# Patient Record
Sex: MALE | Race: WHITE | NOT HISPANIC OR LATINO | Employment: FULL TIME
[De-identification: names, ages, dates, MRNs, and addresses within clinical notes are randomized per-mention and may not be internally consistent; named-entity substitution may affect disease eponyms.]

---

## 2020-02-08 ENCOUNTER — HEALTH MAINTENANCE LETTER (OUTPATIENT)
Age: 55
End: 2020-02-08

## 2020-04-27 ENCOUNTER — VIRTUAL VISIT (OUTPATIENT)
Dept: FAMILY MEDICINE | Facility: CLINIC | Age: 55
End: 2020-04-27
Payer: COMMERCIAL

## 2020-04-27 DIAGNOSIS — A07.8 BLASTOCYSTIS HOMINIS: ICD-10-CM

## 2020-04-27 DIAGNOSIS — R19.7 DIARRHEA OF PRESUMED INFECTIOUS ORIGIN: Primary | ICD-10-CM

## 2020-04-27 PROCEDURE — 36415 COLL VENOUS BLD VENIPUNCTURE: CPT | Performed by: FAMILY MEDICINE

## 2020-04-27 PROCEDURE — 87209 SMEAR COMPLEX STAIN: CPT | Performed by: FAMILY MEDICINE

## 2020-04-27 PROCEDURE — 99203 OFFICE O/P NEW LOW 30 MIN: CPT | Mod: 95 | Performed by: FAMILY MEDICINE

## 2020-04-27 PROCEDURE — 87329 GIARDIA AG IA: CPT | Performed by: FAMILY MEDICINE

## 2020-04-27 PROCEDURE — 87449 NOS EACH ORGANISM AG IA: CPT | Performed by: FAMILY MEDICINE

## 2020-04-27 PROCEDURE — 83630 LACTOFERRIN FECAL (QUAL): CPT | Performed by: FAMILY MEDICINE

## 2020-04-27 PROCEDURE — 87177 OVA AND PARASITES SMEARS: CPT | Performed by: FAMILY MEDICINE

## 2020-04-27 PROCEDURE — 87493 C DIFF AMPLIFIED PROBE: CPT | Performed by: FAMILY MEDICINE

## 2020-04-27 PROCEDURE — 87506 IADNA-DNA/RNA PROBE TQ 6-11: CPT | Performed by: FAMILY MEDICINE

## 2020-04-27 PROCEDURE — 87328 CRYPTOSPORIDIUM AG IA: CPT | Performed by: FAMILY MEDICINE

## 2020-04-27 NOTE — PATIENT INSTRUCTIONS
Patient Education     Diarrhea with Uncertain Cause (Adult)    Diarrhea is when stools are loose and watery. This can be caused by:    Viral infections    Bacterial infections    Food poisoning    Parasites    Irritable bowel syndrome (IBS)    Inflammatory bowel diseases such as ulcerative colitis, Crohn's disease, and celiac disease    Food intolerance, such as to lactose, the sugar found in milk and milk products    Reaction to medicines like antibiotics, laxatives, cancer drugs, and antacids  Along with diarrhea, you may also have:    Abdominal pain and cramping    Nausea and vomiting    Loss of bowel control    Fever and chills    Bloody stools  In some cases, antibiotics may help to treat diarrhea. You may have a stool sample test. This is done to see what is causing your diarrhea, and if antibiotics will help treat it. The results of a stool sample test may take up to 2 days. The healthcare provider may not give you antibiotics until he or she has the stool test results.  Diarrhea can cause dehydration. This is the loss of too much water and other fluids from the body. When this occurs, body fluid must be replaced. This can be done with oral rehydration solutions. Oral rehydration solutions are available at drugstores and grocery stores without a prescription. Sports drinks are not the best choice if you are very dehydrated. They have too much sugar and not enough electrolytes.  Home care  Follow all instructions given by your healthcare provider. Rest at home for the next 24 hours, or until you feel better. Avoid caffeine, tobacco, and alcohol. These can make diarrhea, cramping, and pain worse.  If taking medicines:    Over-the-counter nausea and diarrhea medicines are generally OK unless you experience fever or blood stool. Check with your doctor first in those circumstances.    You may use acetaminophen or NSAID medicines like ibuprofen or naproxen to reduce pain and fever. Don t use these if you have  chronic liver or kidney disease, or ever had a stomach ulcer or gastrointestinal bleeding. Don't use NSAID medicines if you are already taking one for another condition (like arthritis) or are on daily aspirin therapy (such as for heart disease or after a stroke). Talk with your healthcare provider first.    If antibiotics were prescribed, be sure you take them until they are finished. Don t stop taking them even when you feel better. Antibiotics must be taken as a full course.  To prevent the spread of illness:    Remember that washing with soap and water and using alcohol-based  is the best way to prevent the spread of infection. Dry your hands with a single use towel (like a paper towel).    Clean the toilet after each use.    Wash your hands before eating.    Wash your hands before and after preparing food. Keep in mind that people with diarrhea or vomiting should not prepare food for others.    Wash your hands after using cutting boards, countertops, and knives that have been in contact with raw foods.    Wash and then peel fruits and vegetables.    Keep uncooked meats away from cooked and ready-to-eat foods.    Use a food thermometer when cooking. Cook poultry to at least 165 F (74 C). Cook ground meat (beef, veal, pork, lamb) to at least 160 F (71 C). Cook fresh beef, veal, lamb, and pork to at least 145 F (63 C).    Don t eat raw or undercooked eggs (poached or santos side up), poultry, meat, or unpasteurized milk and juices.  Food and drinks  The main goal while treating vomiting or diarrhea is to prevent dehydration. This is done by taking small amounts of liquids often.    Keep in mind that liquids are more important than food right now.    Drink only small amounts of liquids at a time.    Don t force yourself to eat, especially if you are having cramping, vomiting, or diarrhea. Don t eat large amounts at a time, even if you are hungry.    If you eat, avoid fatty, greasy, spicy, or fried  foods.    Don t eat dairy foods or drink milk if you have diarrhea. These can make diarrhea worse.  During the first 24 hours you can try:    Oral rehydration solutions.  Sports drinks may be used if you are not too dehydrated and are otherwise healthy.    Soft drinks without caffeine    Ginger ale    Water (plain or flavored)    Decaf tea or coffee    Clear broth, consommé, or bouillon    Gelatin, popsicles, or frozen fruit juice bars  The second 24 hours, if you are feeling better, you can add:    Hot cereal, plain toast, bread, rolls, or crackers    Plain noodles, rice, mashed potatoes, chicken noodle soup, or rice soup    Unsweetened canned fruit (no pineapple)    Bananas  As you recover:    Limit fat intake to less than 15 grams per day. Don t eat margarine, butter, oils, mayonnaise, sauces, gravies, fried foods, peanut butter, meat, poultry, or fish.    Limit fiber. Don t eat raw or cooked vegetables, fresh fruits except bananas, or bran cereals.    Limit caffeine and chocolate.    Limit dairy.    Don t use spices or seasonings except salt.    Go back to your normal diet over time, as you feel better and your symptoms improve.    If the symptoms come back, go back to a simple diet or clear liquids.  Follow-up care  Follow up with your healthcare provider, or as advised. If a stool sample was taken or cultures were done, call the healthcare provider for the results as instructed.  Call 911  Call 911 if you have any of these symptoms:    Trouble breathing    Confusion    Extreme drowsiness or trouble walking    Loss of consciousness    Rapid heart rate    Chest pain    Stiff neck    Seizure  When to seek medical advice  Call your healthcare provider right away if any of these occur:    Abdominal pain that gets worse    Constant lower right abdominal pain    Continued vomiting and inability to keep liquids down    Diarrhea more than 5 times a day    Blood in vomit or stool    Dark urine or no urine for 8 hours,  dry mouth and tongue, tiredness, weakness, or dizziness    Drowsiness    New rash    You don t get better in 2 to 3 days    Fever of 100.4 F (38 C) or higher, or as directed by your healthcare provider  Date Last Reviewed: 6/1/2018 2000-2019 [employer]. [address] [place] [id number]. All rights reserved. This information is not intended as a substitute for professional medical care. Always follow your healthcare professional's instructions.

## 2020-04-27 NOTE — PROGRESS NOTES
"Refugio Davies is a 54 year old male who is being evaluated via a billable telephone visit.      The patient has been notified of following:     \"This telephone visit will be conducted via a call between you and your physician/provider. We have found that certain health care needs can be provided without the need for a physical exam.  This service lets us provide the care you need with a short phone conversation.  If a prescription is necessary we can send it directly to your pharmacy.  If lab work is needed we can place an order for that and you can then stop by our lab to have the test done at a later time.    Telephone visits are billed at different rates depending on your insurance coverage. During this emergency period, for some insurers they may be billed the same as an in-person visit.  Please reach out to your insurance provider with any questions.    If during the course of the call the physician/provider feels a telephone visit is not appropriate, you will not be charged for this service.\"    Patient has given verbal consent for Telephone visit?  Yes    How would you like to obtain your AVS? Mail a copy    Subjective     Refugio Davies is a 54 year old male who presents to clinic today for the following health issues:    HPI  ABDOMINAL   PAIN     Onset: about one week    Description:   Character: patient states not really pain, just discomfort when rub belly.  Location: upper right quadrant  Radiation: None    Intensity: mild    Progression of Symptoms:  intermittent    Accompanying Signs & Symptoms:  Fever/Chills?: no   Gas/Bloating: no   Nausea: no   Vomitting: no   Diarrhea?: YES  Constipation:no   Dysuria or Hematuria: no    History:   Trauma: no   Previous similar pain: no    Previous tests done: none    Precipitating factors:   Does the pain change with:     Food: no      BM: YES    Urination: no     Alleviating factors:  None.    Therapies Tried and outcome: pepto bismol x 3 dose.    LMP:  not " applicable     Past medical, family, and social histories, medications, and allergies are reviewed and updated in Epic.       Review of Systems   ROS COMP: Constitutional, HEENT, cardiovascular, pulmonary,and gu systems are negative, except as otherwise noted.       Objective   Reported vitals:  There were no vitals taken for this visit.   PSYCH: Alert and oriented times 3; coherent speech, normal   rate and volume, able to articulate logical thoughts, able   to abstract reason, no tangential thoughts, no hallucinations   or delusions  His affect is normal  RESP: No cough, no audible wheezing, able to talk in full sentences  Remainder of exam unable to be completed due to telephone visits          ASSESSMENT/PLAN:  (R19.7) Diarrhea of presumed infectious origin  (primary encounter diagnosis)  Comment:   Plan: Clostridium difficile toxin B PCR, Fecal         Lactoferrin, Giardia antigen, Ova and Parasite         Exam Routine, Ova and Parasite Exam Routine,         Enteric Bacteria and Virus Panel by STEPHANIE Stool,         Ova and Parasite Exam Routine, Adenovirus         antigen stool    (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      Phone call duration:  11 minutes    Vito Maharaj MD

## 2020-04-28 DIAGNOSIS — R19.7 DIARRHEA OF PRESUMED INFECTIOUS ORIGIN: ICD-10-CM

## 2020-04-28 LAB
C COLI+JEJUNI+LARI FUSA STL QL NAA+PROBE: NOT DETECTED
C DIFF TOX B STL QL: NEGATIVE
EC STX1 GENE STL QL NAA+PROBE: NOT DETECTED
EC STX2 GENE STL QL NAA+PROBE: NOT DETECTED
ENTERIC PATHOGEN COMMENT: NORMAL
LACTOFERRIN STL QL IA: POSITIVE
NOROV GI+II ORF1-ORF2 JNC STL QL NAA+PR: NOT DETECTED
RVA NSP5 STL QL NAA+PROBE: NOT DETECTED
SALMONELLA SP RPOD STL QL NAA+PROBE: NOT DETECTED
SHIGELLA SP+EIEC IPAH STL QL NAA+PROBE: NOT DETECTED
SPECIMEN SOURCE: NORMAL
V CHOL+PARA RFBL+TRKH+TNAA STL QL NAA+PR: NOT DETECTED
Y ENTERO RECN STL QL NAA+PROBE: NOT DETECTED

## 2020-04-29 LAB
C PARVUM AG STL QL IA: NEGATIVE
G LAMBLIA AG STL QL IA: NEGATIVE
G LAMBLIA AG STL QL IA: NORMAL
G LAMBLIA AG STL QL IA: NORMAL
HADV AG STL QL IA: NEGATIVE
O+P STL MICRO: ABNORMAL
SPECIMEN SOURCE: ABNORMAL
SPECIMEN SOURCE: NORMAL
SPECIMEN SOURCE: NORMAL

## 2020-04-30 RX ORDER — METRONIDAZOLE 500 MG/1
750 TABLET ORAL 3 TIMES DAILY
Qty: 45 TABLET | Refills: 0 | Status: SHIPPED | OUTPATIENT
Start: 2020-04-30 | End: 2020-05-10

## 2020-06-23 ENCOUNTER — DOCUMENTATION ONLY (OUTPATIENT)
Dept: LAB | Facility: CLINIC | Age: 55
End: 2020-06-23

## 2020-06-23 DIAGNOSIS — Z13.1 SCREENING FOR DIABETES MELLITUS: ICD-10-CM

## 2020-06-23 DIAGNOSIS — Z12.5 SCREENING FOR PROSTATE CANCER: ICD-10-CM

## 2020-06-23 DIAGNOSIS — Z13.220 LIPID SCREENING: Primary | ICD-10-CM

## 2020-06-23 NOTE — PROGRESS NOTES
Patient has an upcoming previsit appointment on 7/7/2020. Please review pended orders and add additional orders if needed.     Thank you,   Aura Hamm

## 2020-08-19 DIAGNOSIS — Z13.220 LIPID SCREENING: ICD-10-CM

## 2020-08-19 DIAGNOSIS — Z13.1 SCREENING FOR DIABETES MELLITUS: ICD-10-CM

## 2020-08-19 DIAGNOSIS — Z12.5 SCREENING FOR PROSTATE CANCER: ICD-10-CM

## 2020-08-19 LAB
CHOLEST SERPL-MCNC: 184 MG/DL
GLUCOSE SERPL-MCNC: 104 MG/DL (ref 70–99)
HDLC SERPL-MCNC: 48 MG/DL
LDLC SERPL CALC-MCNC: 121 MG/DL
NONHDLC SERPL-MCNC: 136 MG/DL
PSA SERPL-ACNC: 0.66 UG/L (ref 0–4)
TRIGL SERPL-MCNC: 77 MG/DL

## 2020-08-19 PROCEDURE — 36415 COLL VENOUS BLD VENIPUNCTURE: CPT | Performed by: FAMILY MEDICINE

## 2020-08-19 PROCEDURE — 80061 LIPID PANEL: CPT | Performed by: FAMILY MEDICINE

## 2020-08-19 PROCEDURE — G0103 PSA SCREENING: HCPCS | Performed by: FAMILY MEDICINE

## 2020-08-19 PROCEDURE — 82947 ASSAY GLUCOSE BLOOD QUANT: CPT | Performed by: FAMILY MEDICINE

## 2020-08-31 ENCOUNTER — OFFICE VISIT (OUTPATIENT)
Dept: FAMILY MEDICINE | Facility: CLINIC | Age: 55
End: 2020-08-31
Payer: COMMERCIAL

## 2020-08-31 VITALS
HEART RATE: 76 BPM | DIASTOLIC BLOOD PRESSURE: 68 MMHG | WEIGHT: 176 LBS | RESPIRATION RATE: 18 BRPM | OXYGEN SATURATION: 96 % | BODY MASS INDEX: 23.33 KG/M2 | TEMPERATURE: 98.3 F | HEIGHT: 73 IN | SYSTOLIC BLOOD PRESSURE: 130 MMHG

## 2020-08-31 DIAGNOSIS — Z23 NEED FOR STREPTOCOCCUS PNEUMONIAE VACCINATION: ICD-10-CM

## 2020-08-31 DIAGNOSIS — Z23 NEED FOR VACCINE FOR TD (TETANUS-DIPHTHERIA): ICD-10-CM

## 2020-08-31 DIAGNOSIS — Z11.4 SCREENING FOR HUMAN IMMUNODEFICIENCY VIRUS: ICD-10-CM

## 2020-08-31 DIAGNOSIS — Z23 NEED FOR INFLUENZA VACCINATION: ICD-10-CM

## 2020-08-31 DIAGNOSIS — Z83.49 FAMILY HISTORY THYROID DISEASE IN BROTHER: ICD-10-CM

## 2020-08-31 DIAGNOSIS — Z86.39 HISTORY OF ELEVATED GLUCOSE: ICD-10-CM

## 2020-08-31 DIAGNOSIS — Z23 NEED FOR SHINGLES VACCINE: ICD-10-CM

## 2020-08-31 DIAGNOSIS — Z12.11 COLON CANCER SCREENING: ICD-10-CM

## 2020-08-31 DIAGNOSIS — Z00.00 ROUTINE GENERAL MEDICAL EXAMINATION AT A HEALTH CARE FACILITY: Primary | ICD-10-CM

## 2020-08-31 PROBLEM — F17.200 TOBACCO USE DISORDER: Status: ACTIVE | Noted: 2020-08-31

## 2020-08-31 PROBLEM — Z87.09 HISTORY OF PNEUMOTHORAX: Status: ACTIVE | Noted: 2020-08-31

## 2020-08-31 PROBLEM — M41.26 OTHER IDIOPATHIC SCOLIOSIS, LUMBAR REGION: Status: ACTIVE | Noted: 2020-08-31

## 2020-08-31 LAB
HBA1C MFR BLD: 5.4 % (ref 0–5.6)
HIV 1+2 AB+HIV1 P24 AG SERPL QL IA: NONREACTIVE
TSH SERPL DL<=0.005 MIU/L-ACNC: 1.52 MU/L (ref 0.4–4)

## 2020-08-31 PROCEDURE — 90714 TD VACC NO PRESV 7 YRS+ IM: CPT | Performed by: FAMILY MEDICINE

## 2020-08-31 PROCEDURE — 83036 HEMOGLOBIN GLYCOSYLATED A1C: CPT | Performed by: FAMILY MEDICINE

## 2020-08-31 PROCEDURE — 90682 RIV4 VACC RECOMBINANT DNA IM: CPT | Performed by: FAMILY MEDICINE

## 2020-08-31 PROCEDURE — 36415 COLL VENOUS BLD VENIPUNCTURE: CPT | Performed by: FAMILY MEDICINE

## 2020-08-31 PROCEDURE — 90732 PPSV23 VACC 2 YRS+ SUBQ/IM: CPT | Performed by: FAMILY MEDICINE

## 2020-08-31 PROCEDURE — 90472 IMMUNIZATION ADMIN EACH ADD: CPT | Performed by: FAMILY MEDICINE

## 2020-08-31 PROCEDURE — 99396 PREV VISIT EST AGE 40-64: CPT | Mod: 25 | Performed by: FAMILY MEDICINE

## 2020-08-31 PROCEDURE — 90750 HZV VACC RECOMBINANT IM: CPT | Performed by: FAMILY MEDICINE

## 2020-08-31 PROCEDURE — 87389 HIV-1 AG W/HIV-1&-2 AB AG IA: CPT | Performed by: FAMILY MEDICINE

## 2020-08-31 PROCEDURE — 84443 ASSAY THYROID STIM HORMONE: CPT | Performed by: FAMILY MEDICINE

## 2020-08-31 PROCEDURE — 90471 IMMUNIZATION ADMIN: CPT | Performed by: FAMILY MEDICINE

## 2020-08-31 ASSESSMENT — PAIN SCALES - GENERAL: PAINLEVEL: MILD PAIN (3)

## 2020-08-31 ASSESSMENT — MIFFLIN-ST. JEOR: SCORE: 1696.17
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
## 2020-08-31 NOTE — PROGRESS NOTES
Prior to immunization administration, verified patients identity using patient s name and date of birth. Please see Immunization Activity for additional information.     Screening Questionnaire for Adult Immunization    Are you sick today?   No   Do you have allergies to medications, food, a vaccine component or latex?   No   Have you ever had a serious reaction after receiving a vaccination?   No   Do you have a long-term health problem with heart, lung, kidney, or metabolic disease (e.g., diabetes), asthma, a blood disorder, no spleen, complement component deficiency, a cochlear implant, or a spinal fluid leak?  Are you on long-term aspirin therapy?   No   Do you have cancer, leukemia, HIV/AIDS, or any other immune system problem?   No   Do you have a parent, brother, or sister with an immune system problem?   No   In the past 3 months, have you taken medications that affect  your immune system, such as prednisone, other steroids, or anticancer drugs; drugs for the treatment of rheumatoid arthritis, Crohn s disease, or psoriasis; or have you had radiation treatments?   No   Have you had a seizure, or a brain or other nervous system problem?   No   During the past year, have you received a transfusion of blood or blood    products, or been given immune (gamma) globulin or antiviral drug?   No   For women: Are you pregnant or is there a chance you could become       pregnant during the next month?   No   Have you received any vaccinations in the past 4 weeks?   No     Immunization questionnaire answers were all negative.        Per orders of Dr. Maharaj, injection of Influenza   given by Maria Teresa Baum MA. Patient instructed to remain in clinic for 15 minutes afterwards, and to report any adverse reaction to me immediately.       Screening performed by Maria Teresa Baum MA on 8/31/2020 at 9:31 AM.

## 2020-08-31 NOTE — PATIENT INSTRUCTIONS
At [facility], we strive to deliver an exceptional experience to you, every time we see you. If you receive a survey, please complete it as we do value your feedback.  If you have MyChart, you can expect to receive results automatically within 24 hours of their completion.  Your provider will send a note interpreting your results as well.   If you do not have MyChart, you should receive your results in about a week by mail.    Your care team:                            Family Medicine Internal Medicine   MD Angel Anderson, MD Kvng De Anda MD Katya Georgiev PA-C  Genna Villeda, APRN CNP    Yonis Allen, MD Pediatrics   Philippe Monzon, PARadhaC  Tasneem Gonzales, CNP MD Chandni Murray APRN CNP   MD Gini Gray MD Deborah Mielke, MD Rosa Elena Leal, APRN CNP  Joceline Avelar, PARadhaC  Katarina Valentin, CNP  MD Pearl Wetzel MD Angela Wermerskirchen, MD      Clinic hours: Monday - Thursday 7 am-7 pm; Fridays 7 am-5 pm.   Urgent care: Monday - Friday 11 am-9 pm; Saturday and Sunday 9 am-5 pm.    Clinic: [phone]       [facility]: Monday - Thursday 8 am - 7 pm; Friday 8 am - 6 pm  [facility]: [phone]     Use [email or web address] for 24/7 diagnosis and treatment of dozens of conditions.      Preventive Health Recommendations  Male Ages 50 - 64    Yearly exam:             See your health care provider every year in order to  o   Review health changes.   o   Discuss preventive care.    o   Review your medicines if your doctor has prescribed any.     Have a cholesterol test every 5 years, or more frequently if you are at risk for high cholesterol/heart disease.     Have a diabetes test (fasting glucose) every three years. If you are at risk for diabetes, you should have this test more often.     Have a colonoscopy at age 50, or have a yearly  FIT test (stool test). These exams will check for colon cancer.      Talk with your health care provider about whether or not a prostate cancer screening test (PSA) is right for you.    You should be tested each year for STDs (sexually transmitted diseases), if you re at risk.     Shots: Get a flu shot each year. Get a tetanus shot every 10 years.     Nutrition:    Eat at least 5 servings of fruits and vegetables daily.     Eat whole-grain bread, whole-wheat pasta and brown rice instead of white grains and rice.     Get adequate Calcium and Vitamin D.     Lifestyle    Exercise for at least 150 minutes a week (30 minutes a day,  days a week). This will help you control your weight and prevent disease.     Limit alcohol to one drink per day.     No smoking.     Wear sunscreen to prevent skin cancer.     See your dentist every six months for an exam and cleaning.     See your eye doctor every 1 to 2 years.    You should receive your second (final) shingles vaccine between 10/31/20 & 3/1/21.   Patient Education     Colorectal Cancer Screening    Colorectal cancer is cancer in the colon or rectum. It is a leading cause of cancer deaths in the [place] But when this cancer is found and removed early, the chances of a full recovery are very good. Because colorectal cancer rarely causes symptoms in its early stages, screening for the disease is important. It s even more crucial if you have risk factors for the disease. Learn more about colorectal cancer and its risk factors. Then talk to your healthcare provider about being screened.  Risk factors for colorectal cancer  Your risk of having colorectal cancer increases if you:    Are 50 years of age or older    Have a family history or personal history of colorectal cancer or polyps    Have a personal history of type 2 diabetes, Crohn s disease, or ulcerative colitis    Have an inherited genetic syndrome like Barboza syndrome (HNPCC) or familial adenomatous polyposis (FAP)    Are  very overweight    Are not physically active    Smoke    Drink a lot of alcohol    Eat a lot of red or processed meat  The colon and rectum  Waste from food you eat enters the colon from the small intestine. As it travels through the colon, the waste (stool) loses water and becomes more solid. Intestinal muscles push it toward the sigmoid colon. This is the last section of the colon. Stool then moves into the rectum, where it s stored until it s ready to leave the body during a bowel movement.  How colorectal cancer starts  Polyps are growths that form on the inner lining of the colon or rectum. Most are benign, which means they aren t cancer. But over time, some polyps can become cancer (malignant). This happens when cells in these polyps begin growing abnormally. In time, malignant cells invade more of the colon and rectum. The cancer may also spread to nearby organs or lymph nodes or to other parts of the body. Finding and removing polyps can help prevent cancer from forming.  Your screening  Screening means looking for a health problem before you have symptoms. During screening for colorectal cancer, your healthcare provider will ask about your health history, examine you, and do 1 or more tests. To start, you may have:    Health history questions to answer. Your healthcare provider will ask about your health history. Mention if a family member has had colon cancer or polyps. Also mention any health problems you have had in the past.    Digital rectal exam (ESTHER). During a ESTHER, the healthcare provider inserts a lubricated gloved finger into the rectum. The test is painless and takes less than a minute. This test alone is not enough to screen for colorectal cancer. You will also need one of the below tests.  Screening test choices  Fecal occult blood test (FOBT) or fecal immunochemical test (FIT)  These tests check for blood in stool that you can t see (hidden or occult blood). Hidden blood may be a sign of colon  polyps or cancer. A small sample of stool is tested for blood in a laboratory. Most often, you collect this sample at home using a kit your healthcare provider gives you. Follow the instructions carefully for using this kit. You might need to not eat certain foods and not take certain medicines before the test, as directed.  Stool DNA test  This test looks for DNA changes in cells in the stool. These DNA changes might be signs of cancer. It also looks for hidden blood in stool. For this test, you collect an entire bowel movement. This is done using a special container put in the toilet. The sample is then sent to a lab for testing.  Barium enema with contrast (double-contrast barium enema)  This test uses X-rays to create images of the entire colon and rectum. The day before this test, you will need to do a bowel prep to clean out the colon and rectum. A bowel prep is a liquid diet plus strong laxatives or enemas. You will be awake for the test, but you may be given medicine to help you relax. At the start of the test, a healthcare provider who specializes in imaging tests (radiologist) places a soft tube into the rectum. The tube is used to fill the colon with a contrast liquid (barium) and air. This can be uncomfortable for some people. The liquid helps the colon show up clearly on the X-rays. Because the test uses X-rays, it exposes you to a small amount of radiation.  Virtual colonoscopy  This exam is also called a CT colonography. It uses a series of X-ray photographs to create a 3-D view of the colon and rectum. The day before the test, you will need to do a bowel prep to clean out your colon. Your healthcare provider will give you instructions on how to do this. During the procedure, you will lie on a table that is part of a special X-ray machine called a CT scanner. A small tube will be placed into your rectum to fill the colon and rectum with air. This can be uncomfortable for some people. Then, the table  will move into the machine and pictures will be taken of your colon and rectum. A computer will combine these photos to create a 3-D picture. Because the test uses X-rays, it exposes you to a small amount of radiation.  Scope exams  Here are 2 types of scope exams:    Colonoscopy. This test can be used to find and remove polyps anywhere in the colon or rectum. The day before the test, you will do a bowel prep. This is a liquid diet plus a strong laxative solution or an enema. The bowel prep will cleanse your colon. You will be given instructions for this. Just before the test, you are given a medicine to make you sleepy. Then, a long, flexible, lighted tube called a colonoscope is gently inserted into the rectum and guided through the entire colon. Images of the colon are viewed on a video screen. Any polyps that are found are removed and sent to a lab for testing. If a polyp can t be removed, a sample of tissue is taken and the polyp might be removed later during surgery. You will need to bring someone with you to drive you home after this test. Colonoscopy is the only screening test that lets your healthcare provider see the entire colon and rectum. This test also lets your healthcare provider remove any pieces of tissue that need to be looked at by a lab. If something suspicious is found using any other tests, you will likely need a colonoscopy.    Sigmoidoscopy. This test is similar to colonoscopy, but focuses only on the sigmoid colon and rectum. As with colonoscopy, bowel prep must be done the day before this test. It might not need to be as complete as the bowel prep for a colonoscopy. You are awake during the procedure, but you may be given medicine to help you relax. During the test, the healthcare provider guides a thin, flexible, lighted tube called a sigmoidoscope through your rectum and lower colon. The images are displayed on a video screen. Polyps are removed, if possible, and sent to a lab for  testing.     When to call your healthcare provider after a test  Call your healthcare provider if you have any of the following after any screening test:    Bleeding    Fever of 100.4 F (38 C) or higher, or as directed by your healthcare provider    Abdominal pain    Vomiting   Date Last Reviewed: 12/1/2017 2000-2019 [employer] [address] [place] [address]. All rights reserved. This information is not intended as a substitute for professional medical care. Always follow your healthcare professional's instructions.

## 2020-09-01 DIAGNOSIS — Z00.00 ROUTINE GENERAL MEDICAL EXAMINATION AT A HEALTH CARE FACILITY: ICD-10-CM

## 2020-09-01 DIAGNOSIS — Z12.11 COLON CANCER SCREENING: ICD-10-CM

## 2020-09-01 LAB — HEMOCCULT STL QL IA: NEGATIVE

## 2020-09-01 PROCEDURE — 82274 ASSAY TEST FOR BLOOD FECAL: CPT | Performed by: FAMILY MEDICINE

## 2021-09-11 ENCOUNTER — HEALTH MAINTENANCE LETTER (OUTPATIENT)
Age: 56
End: 2021-09-11

## 2021-11-06 ENCOUNTER — HEALTH MAINTENANCE LETTER (OUTPATIENT)
Age: 56
End: 2021-11-06

## 2021-11-20 ENCOUNTER — ANCILLARY PROCEDURE (OUTPATIENT)
Dept: GENERAL RADIOLOGY | Facility: CLINIC | Age: 56
End: 2021-11-20
Attending: PHYSICIAN ASSISTANT
Payer: COMMERCIAL

## 2021-11-20 ENCOUNTER — OFFICE VISIT (OUTPATIENT)
Dept: URGENT CARE | Facility: URGENT CARE | Age: 56
End: 2021-11-20
Payer: COMMERCIAL

## 2021-11-20 VITALS
RESPIRATION RATE: 14 BRPM | SYSTOLIC BLOOD PRESSURE: 153 MMHG | TEMPERATURE: 98.5 F | OXYGEN SATURATION: 98 % | HEART RATE: 69 BPM | DIASTOLIC BLOOD PRESSURE: 75 MMHG

## 2021-11-20 DIAGNOSIS — M25.551 HIP PAIN, RIGHT: ICD-10-CM

## 2021-11-20 DIAGNOSIS — M79.651 PAIN OF RIGHT THIGH: ICD-10-CM

## 2021-11-20 DIAGNOSIS — M79.651 PAIN OF RIGHT THIGH: Primary | ICD-10-CM

## 2021-11-20 PROCEDURE — 72170 X-RAY EXAM OF PELVIS: CPT | Mod: RT | Performed by: RADIOLOGY

## 2021-11-20 PROCEDURE — 73552 X-RAY EXAM OF FEMUR 2/>: CPT | Mod: RT | Performed by: RADIOLOGY

## 2021-11-20 PROCEDURE — 99213 OFFICE O/P EST LOW 20 MIN: CPT | Performed by: PHYSICIAN ASSISTANT

## 2021-11-20 ASSESSMENT — ENCOUNTER SYMPTOMS
MYALGIAS: 1
CONSTITUTIONAL NEGATIVE: 1
EYES NEGATIVE: 1
GASTROINTESTINAL NEGATIVE: 1
ENDOCRINE NEGATIVE: 1
RESPIRATORY NEGATIVE: 1
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
## 2022-01-11 ENCOUNTER — OFFICE VISIT (OUTPATIENT)
Dept: FAMILY MEDICINE | Facility: CLINIC | Age: 57
End: 2022-01-11
Payer: OTHER MISCELLANEOUS

## 2022-01-11 VITALS
OXYGEN SATURATION: 98 % | HEART RATE: 102 BPM | SYSTOLIC BLOOD PRESSURE: 155 MMHG | WEIGHT: 174.4 LBS | TEMPERATURE: 97.3 F | DIASTOLIC BLOOD PRESSURE: 75 MMHG | BODY MASS INDEX: 22.85 KG/M2

## 2022-01-11 DIAGNOSIS — M25.522 LEFT ELBOW PAIN: ICD-10-CM

## 2022-01-11 DIAGNOSIS — M79.632 PAIN OF LEFT FOREARM: ICD-10-CM

## 2022-01-11 DIAGNOSIS — Z02.6 ENCOUNTER RELATED TO WORKER'S COMPENSATION CLAIM: Primary | ICD-10-CM

## 2022-01-11 PROCEDURE — 99214 OFFICE O/P EST MOD 30 MIN: CPT | Performed by: PREVENTIVE MEDICINE

## 2022-01-11 NOTE — PROGRESS NOTES
"  Assessment & Plan     Encounter related to worker's compensation claim  -date of injury 1/6/22  -notes from the emergency room reviewed  -X rays in the ER did not show any acute bony abnormalities  -work note provided, please see Letters section   - MR Elbow Left w/o Contrast  - Orthopedic  Referral    Left elbow pain  -differentials include elbow strain, epicondylitis, muscle tears   - MR Elbow Left w/o Contrast  - Orthopedic  Referral    Pain of left forearm  - MR Elbow Left w/o Contrast  - Orthopedic  Referral      Review of external notes as documented elsewhere in note  Ordering of each unique test  25 minutes spent on the date of the encounter doing chart review, history and exam, documentation and further activities per the note       Tobacco Cessation:   reports that he has been smoking cigarettes. He has been smoking about 0.50 packs per day. He has never used smokeless tobacco.      Return in about 2 weeks (around 1/25/2022) for Follow up, with any available provider.    Cathleen Gomez MD MPH    [facility] BRITTON Huff is a 56 year old who presents for the following health issues :    hospitals     Visit for Work Comp:    Date of Injury: 1/6/22    No major changes in pain  Pain does not keep awake at night  No fever  No bruising  Is right hand dominant  Some weakness  No focal tingling  Pain is mainly in elbow and then at times may radiate down the forearm.   Works for [employer]  Pain started after her was lifting a garbage can from a snow bank, heard popping at that time. Since then pain has increased. Was able to get through his work day after initial injury.       Per Emergency room evaluation on 1/6/22:      \"History of Present Illness:   hospitals  Refugiocasey Davies is a 56 y.o. male who presents to the emergency department for evaluation of left arm pain. The patient was working and moving a heavy garbage can when he felt a pop and " "abrupt onset of pain in the left forearm. Pain persisted throughout the day but he was able to finish his job. He reports pain that is worse with movement. No other pain or injuries. No paresthesias or weakness.   Impression and Plan:  Refugio Davies is a 56 y.o. male with left forearm pain after an injury that occurred today at work. The patient was lifting a heavy garbage can when he felt a pop and sudden onset of pain in the forearm. Here, the patient has tenderness throughout the proximal left forearm. There is no weakness or paresthesias. X-rays obtained and are negative without any clear avulsion fracture. History concerning for possible muscle/tendon rupture or strain. He has full range of motion though. I recommended supportive cares with Tylenol, ibuprofen, ice and rest. He was instructed to follow-up with primary care next week for recheck and to discuss advanced imaging if not improving. Indications for return were reviewed. \"    Review of Systems   Constitutional, HEENT, cardiovascular, pulmonary, gi and gu systems are negative, except as otherwise noted.      Objective    BP (!) 155/75 (BP Location: Right arm, Patient Position: Sitting, Cuff Size: Adult Regular)   Pulse 102   Temp 97.3  F (36.3  C) (Tympanic)   Wt 79.1 kg (174 lb 6.4 oz)   SpO2 98%   BMI 22.85 kg/m    Body mass index is 22.85 kg/m .  Physical Exam   GENERAL APPEARANCE: healthy, alert and no distress  EYES: Eyes grossly normal to inspection and conjunctivae and sclerae normal  RESP: lungs clear to auscultation - no rales, rhonchi or wheezes  CV: regular rates and rhythm, normal S1 S2  SKIN: no suspicious lesions or rashes  NEURO: Normal strength and tone, mentation intact and speech normal  PSYCH: mentation appears normal  Left upper extremity: no gross deformities, keeping arm close to his chest in a flexed position. No skin changes, no bruising, no increase in temperature, pulses intact, sensation grossly intact, strength and " range of motion intact in the wrist joint, some tenderness over the medial epicondyle, pain with flexion and extension of the elbow, pain in elbow with resistance at wrist, slight deformity noted proximal to the elbow.

## 2022-01-11 NOTE — LETTER
January 11, 2022      Refugio Davies  [address]  [facility] [address]        To Whom It May Concern:    Refugio Davies was seen in clinic on 1/11/22. He may return to work with the following restrictions in place for 2 weeks:    -No lifting, pushing or pulling with the LEFT upper extremity.     Please let me know if you have any questions.       Sincerely,      Cathleen Gomez MD MPH

## 2022-01-19 ENCOUNTER — ANCILLARY PROCEDURE (OUTPATIENT)
Dept: MRI IMAGING | Facility: CLINIC | Age: 57
End: 2022-01-19
Attending: PREVENTIVE MEDICINE
Payer: OTHER MISCELLANEOUS

## 2022-01-19 DIAGNOSIS — Z02.6 ENCOUNTER RELATED TO WORKER'S COMPENSATION CLAIM: ICD-10-CM

## 2022-01-19 DIAGNOSIS — M25.522 LEFT ELBOW PAIN: ICD-10-CM

## 2022-01-19 DIAGNOSIS — M79.632 PAIN OF LEFT FOREARM: ICD-10-CM

## 2022-01-19 PROCEDURE — 73221 MRI JOINT UPR EXTREM W/O DYE: CPT | Mod: LT | Performed by: RADIOLOGY

## 2022-01-19 NOTE — RESULT ENCOUNTER NOTE
Refugio,     The MRI of the elbow is showing inflammation of a tendon and a moderate grade tear in a tendon (common extensor tendon). Please follow up with the Orthopedics doctor for further evaluation.     Please do not hesitate to call us at [phone] if you have any questions or concerns.    Thank you,    Cathleen Gomez MD MPH

## 2022-01-20 ENCOUNTER — OFFICE VISIT (OUTPATIENT)
Dept: ORTHOPEDICS | Facility: CLINIC | Age: 57
End: 2022-01-20
Attending: PREVENTIVE MEDICINE
Payer: OTHER MISCELLANEOUS

## 2022-01-20 VITALS
SYSTOLIC BLOOD PRESSURE: 160 MMHG | HEIGHT: 73 IN | BODY MASS INDEX: 23.11 KG/M2 | DIASTOLIC BLOOD PRESSURE: 83 MMHG | WEIGHT: 174.4 LBS

## 2022-01-20 DIAGNOSIS — S46.912A SHOULDER STRAIN, LEFT, INITIAL ENCOUNTER: ICD-10-CM

## 2022-01-20 DIAGNOSIS — S16.1XXA NECK STRAIN, INITIAL ENCOUNTER: ICD-10-CM

## 2022-01-20 DIAGNOSIS — M25.522 LEFT ELBOW PAIN: ICD-10-CM

## 2022-01-20 DIAGNOSIS — M79.632 PAIN OF LEFT FOREARM: ICD-10-CM

## 2022-01-20 DIAGNOSIS — Z02.6 ENCOUNTER RELATED TO WORKER'S COMPENSATION CLAIM: ICD-10-CM

## 2022-01-20 DIAGNOSIS — M77.12 LATERAL EPICONDYLITIS, LEFT ELBOW: Primary | ICD-10-CM

## 2022-01-20 PROCEDURE — 99243 OFF/OP CNSLTJ NEW/EST LOW 30: CPT | Performed by: ORTHOPAEDIC SURGERY

## 2022-01-20 ASSESSMENT — PAIN SCALES - GENERAL: PAINLEVEL: SEVERE PAIN (7)

## 2022-01-20 ASSESSMENT — MIFFLIN-ST. JEOR: SCORE: 1679.95

## 2022-01-20 NOTE — LETTER
January 20, 2022      Refugio Davies  [address]  [place] [address]        To Whom It May Concern,      Refugio is under my care for a left arm injury. At this time, he is able to return to work as of 1/26/2022 with the following restrictions: He is unable to push, pull or lift with the left arm. Follow-up to be scheduled in 4 weeks for updated restrictions.          Sincerely,        Jose Daniel Valenzuela MD

## 2022-01-20 NOTE — PROGRESS NOTES
Chief Complaint:   Chief Complaint   Patient presents with     Left Elbow - Pain     1/6/2022 work injury. Was lifting and felt a pop over the lateral elbow with shooting pain into 4th and 5th digits. Is currently not working due to injury. Has tried ice and heat. Denies bruising. Endorses tingling and numbness during MRI     Left Forearm - Pain     1/6/2022 work injury. Was lifting and felt a pop over the lateral elbow with shooting pain into 4th and 5th digits. Is currently not working due to injury. Has tried ice and heat. Denies bruising. Endorses tingling and numbness during MRI      Refugio Davies is seen today in the [facility] for evaluation of left elbow injury at the request of Dr. Cathleen Gomez       HPI: Refugio Davies is a 56 year old male , right -hand dominant, who presents for evaluation and management of a left elbow injury. The injury occurred on 1/6/2022 while at work, pulling a garbage can out of the snow and felt a pop and pain down the forearm to the hand. Worked through the day with the pain. Presented to the ED that night with xrays of the forearm and wrist negative.  It has been 2 weeks since the initial injury. Treatment has been ice, rest, heat. Occasional tylenol but doesn't care to take anything usually. No notable swelling or bruising.    Had MRI yesterday, had pain, numbness and tingling into the ring/small fingers, shoulder.    Occasional pain in the shoulder off/on since injury as well. Has also had some pain up the neck and up into the jaw/left ear.      He reports having moderate-severe pain/discomfort around the injury site. He denies numbness or tingling.   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   Patient Active Problem List   Diagnosis     CARDIOVASCULAR SCREENING; LDL GOAL LESS THAN 160     Tobacco use disorder     Other idiopathic scoliosis, lumbar region     History of pneumothorax       Past surgical history:  has a past surgical history that includes Open reduction internal fixation rodding intramedullary femur (Right, 1997) and Lung surgery (Right, 2009).     Medications: @     Allergies:   No Known Allergies     Family History: family history includes Breast Cancer in his mother; Diabetes in his maternal aunt; Thyroid Disease in his brother.     Social History:  reports that he has been smoking cigarettes. He has been smoking about 0.50 packs per day. He has never used smokeless tobacco. He reports current alcohol use. He reports that he does not use drugs.    Review of Systems:  ROS: 10 point ROS neg other than the symptoms noted above in the HPI and past medical history.    Physical Exam  GENERAL APPEARANCE: healthy, alert, no distress.   SKIN: no suspicious lesions or rashes  RESPIRATORY: No increased work of breathing.  NEURO: Normal strength and tone, mentation intact and speech normal  PSYCH:  mentation appears normal and affect normal. Not anxious.  Hands: no clubbing, nail pitting or nodes.    BP (!) 160/83   Ht 1.854 m (6' 1\")   Wt 79.1 kg (174 lb 6.4 oz)   BMI 23.01 kg/m       Neck:  Full range of motion  Tender to palpation along the left paraspinals and trapezius       left  UPPER EXTREMITY:    Sensation intact to light touch in " median, radial, ulnar and axillary nerve distributions  Palpable 2+ radial pulse, brisk capillary refill to all fingers, wwp  Intact epl fpl fdp edc wrist flexion/extension biceps triceps deltoid  DTR's normal biceps and brachioradialis      SHOULDER:  Skin intact. No abnormal discoloration  No obvious swelling  Tender to palpation deltoid, supraspinatus  Range of motion: within normal limits  Positive impingement, mild    ELBOW:  Skin intact. No open wounds. No skin maceration.  Inspection: no swelling, no ecchymosis, no olecranon bursa swelling, no distal bicep tendon defect  Tender: lateral epicondyle, common extensor tendon and extensor muscle of forearm  Non-tender: medial epicondyle, common flexor tendon, flexor muscle of forearm, supracondylar notch, olecranon bursa and distal bicep tendon  Range of Motion: all normal  Strength: elbow strength full  Special tests: normal stability, pain with resisted wrist extension, pain with resisted middle finger extension, no pain with resisted wrist flexion  Resisted wrist extension also causes dorsal hand pain.  There is no gross deformity in the area.        X-rays:  3 views left wrist, 2 views left forearm, [facility], from 1/6/2022 were reviewed in clinic today.  No obvious fractures or dislocations..    MRI left elbow 1/19/2022:  On the background of tendinosis, moderate-grade intrasubstance tear of the common extensor tendon at its proximal attachment.    Assessment: 56 year old male , right -hand dominant, with:  1)  acute left elbow pain, strain, underlying lateral epicondylitis.  2) left shoulder strain  3) left neck strain      Plan: nonsurgical..  * discussed with patient history and clinical exam consistent with strain of basically the entire left upper extremity from the neck to the fingers.  * suspect when he pulled on the garbage can pulled or stretched the left upper extremity, straining the neck/arm/forearm and possibly pulling on the nerves as well,  given some numbness and tingling.  * at this time, no surgical treatment recommended.    * there is MRI and clinical evidence of tennis elbow, or lateral epicondylitis, which is tendonitis at the lateral aspect of the elbow.  * treatment is nonoperative, with surgical treatment reserved for recalcitrant symptoms despite long-term nonperative treatment regimen. Most cases expected to resolve over time based on natural history.    Treatment includes:  * rest  * activity modification - avoid aggravating activities and reduce strenuous activities for 6-8 weeks  * ice, ice massage  * Physical therapy, modalities as indicated including ultrasound, massage, iontophoresis  * counterforce elbow brace/strap, provided today  * wrist brace to prevent wrist extension, provided today  * elbow massage and icing  * shoulder range of motion, gentle  * gentle neck range of motion.  * modify lifting technique, palm upwards with lifting to relieve stress on extensor tendons of wrist.  * NDAIDS regularly three times daily x2-3 weeks  * cortisone injection discussed, place at point of maximal tenderness, discussed and consider in future as needed .    Discussed PRP injections, out of pocket expense.  * return to clinic 4 weeks  * workability note.        Jose Daniel Valenzuela M.D., M.S.  Dept. of Orthopaedic Surgery  [facility]

## 2022-01-20 NOTE — LETTER
1/20/2022         RE: Refugio Davies  [address] [place] [address]        Dear Colleague,    Thank you for referring your patient, Refugio Davies, to the [facility] [place]. Please see a copy of my visit note below.    Chief Complaint:   Chief Complaint   Patient presents with     Left Elbow - Pain     1/6/2022 work injury. Was lifting and felt a pop over the lateral elbow with shooting pain into 4th and 5th digits. Is currently not working due to injury. Has tried ice and heat. Denies bruising. Endorses tingling and numbness during MRI     Left Forearm - Pain     1/6/2022 work injury. Was lifting and felt a pop over the lateral elbow with shooting pain into 4th and 5th digits. Is currently not working due to injury. Has tried ice and heat. Denies bruising. Endorses tingling and numbness during MRI      Refugio Davies is seen today in the [facility] for evaluation of left elbow injury at the request of Dr. Cathleen Gomez       HPI: Refugio Davies is a 56 year old male , right -hand dominant, who presents for evaluation and management of a left elbow injury. The injury occurred on 1/6/2022 while at work, pulling a garbage can out of the snow and felt a pop and pain down the forearm to the hand. Worked through the day with the pain. Presented to the ED that night with xrays of the forearm and wrist negative.  It has been 2 weeks since the initial injury. Treatment has been ice, rest, heat. Occasional tylenol but doesn't care to take anything usually. No notable swelling or bruising.    Had MRI yesterday, had pain, numbness and tingling into the ring/small fingers, shoulder.    Occasional pain in the shoulder off/on since injury as well. Has also had some pain up the neck and up into the jaw/left ear.      He reports having moderate-severe pain/discomfort around the injury site. He denies numbness or tingling.   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   Patient Active Problem List   Diagnosis     CARDIOVASCULAR SCREENING; LDL GOAL LESS THAN 160     Tobacco use disorder     Other idiopathic scoliosis, lumbar region     History of pneumothorax       Past surgical history:  has a past surgical history that includes Open reduction internal fixation rodding intramedullary femur (Right, 1997) and Lung surgery (Right, 2009).     Medications: @     Allergies:   No Known Allergies     Family History: family history includes Breast Cancer in his mother; Diabetes in his maternal aunt; Thyroid Disease in his brother.     Social History:  reports that he has been smoking cigarettes. He has been smoking about 0.50 packs per day. He has never used smokeless tobacco. He reports current alcohol use. He reports that he does not use drugs.    Review of Systems:  ROS: 10 point ROS neg other than the symptoms noted above in the HPI and past medical history.    Physical Exam  GENERAL APPEARANCE: healthy, alert, no distress.   SKIN: no suspicious lesions or rashes  RESPIRATORY: No increased work of breathing.  NEURO: Normal strength and tone, mentation intact and speech normal  PSYCH:  mentation appears normal and affect normal. Not anxious.  Hands: no clubbing, nail pitting or  "nodes.    BP (!) 160/83   Ht 1.854 m (6' 1\")   Wt 79.1 kg (174 lb 6.4 oz)   BMI 23.01 kg/m       Neck:  Full range of motion  Tender to palpation along the left paraspinals and trapezius       left  UPPER EXTREMITY:    Sensation intact to light touch in median, radial, ulnar and axillary nerve distributions  Palpable 2+ radial pulse, brisk capillary refill to all fingers, wwp  Intact epl fpl fdp edc wrist flexion/extension biceps triceps deltoid  DTR's normal biceps and brachioradialis      SHOULDER:  Skin intact. No abnormal discoloration  No obvious swelling  Tender to palpation deltoid, supraspinatus  Range of motion: within normal limits  Positive impingement, mild    ELBOW:  Skin intact. No open wounds. No skin maceration.  Inspection: no swelling, no ecchymosis, no olecranon bursa swelling, no distal bicep tendon defect  Tender: lateral epicondyle, common extensor tendon and extensor muscle of forearm  Non-tender: medial epicondyle, common flexor tendon, flexor muscle of forearm, supracondylar notch, olecranon bursa and distal bicep tendon  Range of Motion: all normal  Strength: elbow strength full  Special tests: normal stability, pain with resisted wrist extension, pain with resisted middle finger extension, no pain with resisted wrist flexion  Resisted wrist extension also causes dorsal hand pain.  There is no gross deformity in the area.        X-rays:  3 views left wrist, 2 views left forearm, [facility], from 1/6/2022 were reviewed in clinic today.  No obvious fractures or dislocations..    MRI left elbow 1/19/2022:  On the background of tendinosis, moderate-grade intrasubstance tear of the common extensor tendon at its proximal attachment.    Assessment: 56 year old male , right -hand dominant, with:  1)  acute left elbow pain, strain, underlying lateral epicondylitis.  2) left shoulder strain  3) left neck strain      Plan: nonsurgical..  * discussed with patient history and clinical exam " consistent with strain of basically the entire left upper extremity from the neck to the fingers.  * suspect when he pulled on the garbage can pulled or stretched the left upper extremity, straining the neck/arm/forearm and possibly pulling on the nerves as well, given some numbness and tingling.  * at this time, no surgical treatment recommended.    * there is MRI and clinical evidence of tennis elbow, or lateral epicondylitis, which is tendonitis at the lateral aspect of the elbow.  * treatment is nonoperative, with surgical treatment reserved for recalcitrant symptoms despite long-term nonperative treatment regimen. Most cases expected to resolve over time based on natural history.    Treatment includes:  * rest  * activity modification - avoid aggravating activities and reduce strenuous activities for 6-8 weeks  * ice, ice massage  * Physical therapy, modalities as indicated including ultrasound, massage, iontophoresis  * counterforce elbow brace/strap, provided today  * wrist brace to prevent wrist extension, provided today  * elbow massage and icing  * shoulder range of motion, gentle  * gentle neck range of motion.  * modify lifting technique, palm upwards with lifting to relieve stress on extensor tendons of wrist.  * NDAIDS regularly three times daily x2-3 weeks  * cortisone injection discussed, place at point of maximal tenderness, discussed and consider in future as needed .    Discussed PRP injections, out of pocket expense.  * return to clinic 4 weeks  * workability note.        Jose Daniel Valenzuela M.D., M.S.  Dept. of Orthopaedic Surgery  [facility]      Again, thank you for allowing me to participate in the care of your patient.        Sincerely,        Jose Daniel Valenzuela MD

## 2022-01-24 ENCOUNTER — THERAPY VISIT (OUTPATIENT)
Dept: PHYSICAL THERAPY | Facility: CLINIC | Age: 57
End: 2022-01-24
Attending: ORTHOPAEDIC SURGERY
Payer: OTHER MISCELLANEOUS

## 2022-01-24 DIAGNOSIS — S16.1XXA NECK STRAIN, INITIAL ENCOUNTER: ICD-10-CM

## 2022-01-24 DIAGNOSIS — M77.12 LATERAL EPICONDYLITIS, LEFT ELBOW: ICD-10-CM

## 2022-01-24 DIAGNOSIS — M54.2 NECK PAIN: ICD-10-CM

## 2022-01-24 DIAGNOSIS — M25.512 ACUTE PAIN OF LEFT SHOULDER: ICD-10-CM

## 2022-01-24 DIAGNOSIS — M79.632 PAIN OF LEFT FOREARM: ICD-10-CM

## 2022-01-24 DIAGNOSIS — S46.912A SHOULDER STRAIN, LEFT, INITIAL ENCOUNTER: ICD-10-CM

## 2022-01-24 DIAGNOSIS — M25.522 LEFT ELBOW PAIN: ICD-10-CM

## 2022-01-24 DIAGNOSIS — M25.522 ELBOW PAIN, LEFT: Primary | ICD-10-CM

## 2022-01-24 PROCEDURE — 97161 PT EVAL LOW COMPLEX 20 MIN: CPT | Mod: GP | Performed by: PHYSICAL THERAPIST

## 2022-01-24 PROCEDURE — 97530 THERAPEUTIC ACTIVITIES: CPT | Mod: GP | Performed by: PHYSICAL THERAPIST

## 2022-01-24 PROCEDURE — 97110 THERAPEUTIC EXERCISES: CPT | Mod: GP | Performed by: PHYSICAL THERAPIST

## 2022-01-24 NOTE — PROGRESS NOTES
[facility] Initial Evaluation -- Cervical    Evaluation Date: January 24, 2022  Refugio Davies is a 56 year old male with a cervical/forearm  condition.   Referral: Dr. Valenzuela  Work mechanical stresses: [employer]  Employment status: currently not working due to symptoms; scheduled to return on 1/28/2022 - can not use the L UE  Leisure mechanical stresses: did not ask  Functional disability score (NDI):  14%, SPADI 28.46  VAS score (0-10): 2/10 neck and L shoulder at rest, if use the forearm 6-7/10 (hand in pronation to lift.    Patient goals/expectations:  return to work to painfree    HISTORY:    Present symptoms:  Patient notes that he is feeling an ache in the L side of the neck and shoulder.  He also feels symptoms from the lateral L elbow into the medial 2 fingers proximally..  Symptoms will become sharp with use of the hand/forearm.  He did develop numbness of the entire L UE when he was in the MRI as he was prone with his arms overhead and had his neck rotated.  Has not really felt that same level of numbness since then (1/19/2022).  .  Paresthesia (yes/no):  no    Present since (onset date): 1/6/2022     Symptoms (improving/unchanging/worsening):  Unchanging.     Symptoms commenced as a result of: lifting a garbage can that was frozen into the ground.  He felt a pop in the lateral L elbow and had sharp pain into the forearm and hand.  Condition occurred in the following environment:  Work/outdoors    Symptoms at onset (neck/arm/forearm/headache):lateral elbow, forearm and hand  Constant symptoms (neck/arm/forearm/headache): none  Intermittent symptoms (neck/arm/forearm/headache): neck, L shoulder, lateral elbow, forearm into proximal hand/fingers      Symptoms are made worse with the following: Always Turning (neck), time of day - Always AM and lifting with his forearm in pronation   Symptoms are made better with the following: Always When still, splint, ice,  massage, gentle elbow ROM.     Disturbed sleep (yes/no): no  Number of pillows: 2  Sleeping postures (prone/sup/side R/L): R side lying    Previous episodes (0/1-5/6-10/11+): none Year of first episode: none    Previous history: none  Previous treatments: none    Specific Questions: (as reported by the patient)  Dizziness/Tinnitus/Nausea/Swallowing (pos/neg): negative  Gait/Upper Limbs (normal/abnormal): abnormal as trying not to use the L UE.  Medications (nil/NSAIDS/anlag/steroids/anticoag/other): vitamins  Medical allergies:  none  General health (excellent/good/fair/poor):  good  Pertinent medical history:  Smoking  Imaging (None/Xray/MRI/Other):  MRI-Loss of signal-to-noise ratio particularly coronal and sagittal T2 fat suppressed sequences are likely due to switching coil in middle of  study due to patient's pain. 1. On the background of tendinosis, moderate-grade intrasubstance tear of the common extensor tendon at its proximal attachment.  Recent or major surgery (yes/no): no  Night pain (yes/no): no  Accidents (yes/no): this WRI 1/6/2022  Unexplained weight loss (yes/no): no  Barriers at home: no  Other red flags: no    EXAMINATION    Posture:   Sitting (good/fair/poor): good  Standing (good/fair/poor): good     Protruded head (yes/no): no    Wry Neck (right/left/nil):  no  Relevant (yes/no):  no     Correction of posture(better/worse/no effect): no effect  Other observations:  No swelling noted.  He is wearing a tennis elbow type of strap and wrist brace to prevent wrist extension.      Neurological:    Motor Deficit:  Pain with MMT for wrist extension and finger extension on the L - good power prior to pain   Reflexes:  Brisk and symmetrical  Sensory Deficit:  Not assessed   Dural signs:  Not specifically accessed; does feel increased symptoms in the forearm with active elbow extension.      Movement Loss:   Errol Mod Min Nil Pain   Protrusion        Flexion    x    Retraction   x  Pulling L side of the  neck   Extension  45   Pulling L side of neck   Lateral flexion R   43  Pulling L neck   Lateral flexion L   41  Pulling B neck   Rotation R   75  Pulling on the L neck   Rotation L   68  Pulling on the L neck     Test Movements:   During: produces, abolishes, increases, decreases, no effect, centralizing, peripheralizing  After: better, worse, no better, no worse, no effect, centralized, peripheralized    Pretest symptoms sitting: mild ache at rest on the L   Symptoms During Symptoms After ROM increased ROM decreased No Effect   PRO          Rep PRO          RET Increases    No Worse         Rep RET Increases    No Worse    Inc rot bilaterally     RET EXT Increases    No Worse         Rep RET EXT Increases  Increases ROM with reps    Better    Inc neck ROM and less pain with resisted wrist extension, finger extension       Pretest symptoms lying:  Not assessed   Symptoms During Symptoms After ROM increased ROM decreased No Effect   RET          Rep RET          RET EXT          Rep RET EXT            If required, pretest symptoms sitting:    Not assessed    Symptoms During Symptoms After ROM increased ROM decreased No Effect   LF-R          Rep LF-R          LF-L          Rep LF-L          ROT-R          Rep ROT-R          ROT-L          Rep ROT-L          FLEX          Rep FLEX              Static Tests:   Protrusion:  Not assessed  Flexion:  Not assessed  Retraction:  Not assessed  Extension (sitting/prone/supine):  Not assessed    Other Tests: tenderness to palpation lateral L elbow (common extensor tendon region)    Provisional Classification:  Cervical Derangement - Asymmetrical, unilateral, symptoms below elbow and L elbow - Inconclusive/Other - Trauma/Recovering Trauma    Principle of Management:  Education:  Discussed assessing the cervical spine to determine effect on neck, shoulder and L UE symptoms.  Will add wrist ROM but must not have increased pain after ROM and do not work through pain.  If soreness  develops, ice the lateral elbow.  Discussed gentle ROM will assist with preventing scarring but too much will slow healing.  Patient did appear to understand this concept.  Discussed sitting with back supported to make sure proving proper environment for healing.  Discussed proper sleeping posture with support for the neck (will this help without feeling neck pain in the morning). Stressed that there should not be any lasting symptoms after range of motion exercises.       Equipment provided:  none  Mechanical therapy (Y/N):  Yes for the neck, no for forearm   Extension principle:  Repeated retraction 10 reps, 6-8 times a day, follow with 1 rep of extension and gradually increase reps of extension in following days.   Lateral principle:    Flexion principle:     Other:  Wrist AROM - flexion, extension, UD/RD 10 reps, 3 times a day, elbow pronation/supination 10 reps, 3 times a day.  Ice as needed.     ASSESSMENT/PLAN:    Patient is a 56 year old male with cervical, left side elbow and left forearm complaints.    Patient has the following significant findings with corresponding treatment plan.                Diagnosis 1:  cervical radiculopathy    Pain -  manual therapy, self management, education, directional preference exercise and home program  Decreased ROM/flexibility - manual therapy, therapeutic exercise and home program  Decreased function - therapeutic activities and home program  Diagnosis 2:  L elbow extensor mechanism tear     Pain -  hot/cold therapy, US, manual therapy, self management, education and home program  Decreased ROM/flexibility - manual therapy, therapeutic exercise and home program  Decreased strength - therapeutic exercise, therapeutic activities and home program  Edema - self management/home program and effelurage, modalities as needed  Impaired muscle performance - neuro re-education and home program  Decreased function - therapeutic activities and home program    Therapy Evaluation  Codes:   1) History comprised of:   Personal factors that impact the plan of care:      None.    Comorbidity factors that impact the plan of care are:      None.     Medications impacting care: None.  2) Examination of Body Systems comprised of:   Body structures and functions that impact the plan of care:      Cervical spine, Elbow and Hand.   Activity limitations that impact the plan of care are:      Dressing, Lifting, Working and use of his hand to lift when in a pronated position.  3) Clinical presentation characteristics are:   Stable/Uncomplicated.  4) Decision-Making    Moderate complexity using standardized patient assessment instrument and/or measureable assessment of functional outcome.  Cumulative Therapy Evaluation is: Moderate complexity.    Previous and current functional limitations:  (See Goal Flow Sheet for this information)    Short term and Long term goals: (See Goal Flow Sheet for this information)     Communication ability:  Patient appears to be able to clearly communicate and understand verbal and written communication and follow directions correctly.  Treatment Explanation - The following has been discussed with the patient:   RX ordered/plan of care  Anticipated outcomes  Possible risks and side effects  This patient would benefit from PT intervention to resume normal activities.   Rehab potential is good.    Frequency:  1 X week, once daily  Duration:  for 12 weeks  Discharge Plan:  Achieve all LTG.  Independent in home treatment program.  Reach maximal therapeutic benefit.  Patient may benefit from a referral to hand therapy once cervical symptoms have resolved.    Please refer to the daily flowsheet for treatment today, total treatment time and time spent performing 1:1 timed codes.

## 2022-01-31 ENCOUNTER — THERAPY VISIT (OUTPATIENT)
Dept: PHYSICAL THERAPY | Facility: CLINIC | Age: 57
End: 2022-01-31
Payer: OTHER MISCELLANEOUS

## 2022-01-31 DIAGNOSIS — M25.522 ELBOW PAIN, LEFT: ICD-10-CM

## 2022-01-31 DIAGNOSIS — M79.632 PAIN OF LEFT FOREARM: ICD-10-CM

## 2022-01-31 DIAGNOSIS — M25.512 ACUTE PAIN OF LEFT SHOULDER: ICD-10-CM

## 2022-01-31 DIAGNOSIS — M54.2 NECK PAIN: ICD-10-CM

## 2022-01-31 PROCEDURE — 97110 THERAPEUTIC EXERCISES: CPT | Mod: GP | Performed by: PHYSICAL THERAPIST

## 2022-01-31 PROCEDURE — 97035 APP MDLTY 1+ULTRASOUND EA 15: CPT | Mod: GP | Performed by: PHYSICAL THERAPIST

## 2022-02-07 ENCOUNTER — THERAPY VISIT (OUTPATIENT)
Dept: PHYSICAL THERAPY | Facility: CLINIC | Age: 57
End: 2022-02-07
Payer: OTHER MISCELLANEOUS

## 2022-02-07 DIAGNOSIS — M54.2 NECK PAIN: ICD-10-CM

## 2022-02-07 DIAGNOSIS — M25.522 ELBOW PAIN, LEFT: ICD-10-CM

## 2022-02-07 DIAGNOSIS — M79.632 PAIN OF LEFT FOREARM: ICD-10-CM

## 2022-02-07 DIAGNOSIS — M25.512 ACUTE PAIN OF LEFT SHOULDER: ICD-10-CM

## 2022-02-07 PROCEDURE — 97110 THERAPEUTIC EXERCISES: CPT | Mod: GP | Performed by: PHYSICAL THERAPIST

## 2022-02-07 PROCEDURE — 97035 APP MDLTY 1+ULTRASOUND EA 15: CPT | Mod: GP | Performed by: PHYSICAL THERAPIST

## 2022-02-14 ENCOUNTER — THERAPY VISIT (OUTPATIENT)
Dept: PHYSICAL THERAPY | Facility: CLINIC | Age: 57
End: 2022-02-14
Payer: OTHER MISCELLANEOUS

## 2022-02-14 DIAGNOSIS — M25.512 ACUTE PAIN OF LEFT SHOULDER: ICD-10-CM

## 2022-02-14 DIAGNOSIS — M79.632 PAIN OF LEFT FOREARM: ICD-10-CM

## 2022-02-14 DIAGNOSIS — M54.2 NECK PAIN: ICD-10-CM

## 2022-02-14 DIAGNOSIS — M25.522 ELBOW PAIN, LEFT: ICD-10-CM

## 2022-02-14 PROCEDURE — 97110 THERAPEUTIC EXERCISES: CPT | Mod: GP | Performed by: PHYSICAL THERAPIST

## 2022-02-14 NOTE — PROGRESS NOTES
PROGRESS  REPORT    Progress reporting period is from 1/24/2022 to 2/14/2022.       SUBJECTIVE  Patient relates that he is gradually improving with regards to his L elbow/upper extremity.  He can feel symptoms in the elbow, forearm and dorsum of the hand if lifts something;  worse if his hand is in pronation and elbow extended.  The intensity of the pain when lifting is less if he is wearing his elbow and wrist braces.  He has been consistent with his neck, shoulder, elbow and wrist exercises.  He may feel increased symptoms afterwards.  The symptoms (ache) tend to clear within an hour or less.      Current Pain level: 0/10 (at rest).     Previous pain level was  2/10 neck and L shoulder at rest, if use the forearm 6-7/10 (hand in pronation to lift)  Initial Pain level: severe elbow pain after injury    Changes in function:  Yes (See Goal flowsheet attached for changes in current functional level)  Adverse reaction to treatment or activity: None    OBJECTIVE  Changes noted in objective findings:  Yes, improved posture, decreased resting pain for the elbow, improved shoulder ROM and function.  Continues to feel increased symptoms with resisted elbow, wrist and finger testing.   Objective:   Good sitting posture.    CROM Min loss lateral bend L and extension otherwise full.  Repeated movement testing for the neck does not affect the L UE numbness/tingling if present.   L shoulder AROM - Pain at end range for L shoulder flexion and abduction  Pulling in the forearm and tingling little and ring finger are produced with reaching behind his back.    Minimally less pain at end range after pendulum exercises.    He can feel symptoms in the elbow/forearm with scapula stabilization exercises.    End range strain with active pronation, full and painfree supination.    Was minimally stiff with wrist ROM; better after ROM exercises.    His SPADI score has improved from 28.46 to 4.62.  NDI score has improved from 14 to 2.22      ASSESSMENT/PLAN  Updated problem list and treatment plan: Diagnosis 1:  Cervical radiculopathy  - continue with HEP  Diagnosis 2:  L elbow extensor mechanism tear (recommend referral for hand therapy)  Pain - HEP  decreased strength - HEP  Decreased function - HEP  Diagnosis 3:  L shoulder pain    Pain -  manual therapy, self management, education, home program and modalities if needed  Decreased ROM/flexibility - manual therapy, therapeutic exercise and home program  Decreased function - therapeutic activities and home program   STG/LTGs have been met or progress has been made towards goals:  Yes (See Goal flow sheet completed today.)  Assessment of Progress: The patient's condition is improving.  Patient is meeting short term goals and is progressing towards long term goals.  Self Management Plans:  Patient has been instructed in a home treatment program.  Patient  has been instructed in self management of symptoms.  I have re-evaluated this patient and find that the nature, scope, duration and intensity of the therapy is appropriate for the medical condition of the patient.  Refugio continues to require the following intervention to meet STG and LTG's:  Return to PT if needed for his L shoulder.    Recommendations:  Patient would benefit from the following:  Referral to hand therapy   Return to PT if needed for the L shoulder.  Up to 4 additional sessions if needed.              Please refer to the daily flowsheet for treatment today, total treatment time and time spent performing 1:1 timed codes.

## 2022-02-17 ENCOUNTER — OFFICE VISIT (OUTPATIENT)
Dept: ORTHOPEDICS | Facility: CLINIC | Age: 57
End: 2022-02-17
Payer: OTHER MISCELLANEOUS

## 2022-02-17 VITALS
HEART RATE: 109 BPM | WEIGHT: 178 LBS | BODY MASS INDEX: 23.48 KG/M2 | DIASTOLIC BLOOD PRESSURE: 81 MMHG | SYSTOLIC BLOOD PRESSURE: 169 MMHG

## 2022-02-17 DIAGNOSIS — M77.12 LATERAL EPICONDYLITIS OF LEFT ELBOW: Primary | ICD-10-CM

## 2022-02-17 PROCEDURE — 99213 OFFICE O/P EST LOW 20 MIN: CPT | Performed by: ORTHOPAEDIC SURGERY

## 2022-02-17 ASSESSMENT — PAIN SCALES - GENERAL: PAINLEVEL: MILD PAIN (2)

## 2022-02-17 NOTE — LETTER
2/17/2022         RE: Refugio Davies  [address] [address] [facility] [facility] [place] [address]        Dear Colleague,    Thank you for referring your patient, Refugio Davies, to the [facility] [place]. Please see a copy of my visit note below.    Chief Complaint:   Chief Complaint   Patient presents with     Left Elbow - Pain     Left Forearm - Pain        HPI: Refugio Davies is a 56 year old male , right -hand dominant, who presents for followup evaluation and management of a left elbow injury. Returns today overall doing better. Still pain with reaching/lifting with palm up, but overall improving. Going to Physical Therapy,  More for the neck, not the elbow.    The injury occurred on 1/6/2022 while at work, pulling a garbage can out of the snow and felt a pop and pain down the forearm to the hand. Worked through the day with the pain. Presented to the ED that night with xrays of the forearm and wrist negative.  It has been 6 weeks since the initial injury.     Treatment has been ice, rest, heat. Occasional tylenol but doesn't care to take anything usually. No notable swelling or bruising.      Had MRI and had pain, numbness and tingling into the ring/small fingers, shoulder.    Occasional pain in the shoulder off/on since injury as well. Has also had some pain up the neck and up into the jaw/left ear.      He reports having moderate-severe pain/discomfort around the injury site. He denies numbness or tingling.   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   Patient Active Problem List   Diagnosis     CARDIOVASCULAR SCREENING; LDL GOAL LESS THAN 160     Tobacco use disorder     Other idiopathic scoliosis, lumbar region     History of pneumothorax     Neck pain     Acute pain of left shoulder     Elbow pain, left     Pain of left forearm       Past surgical history:  has a past surgical history that includes Open reduction internal fixation rodding intramedullary femur (Right, 1997) and Lung surgery (Right, 2009).     Medications: No current outpatient medications on file.    Allergies:   No Known Allergies     Family History: family history includes Breast Cancer in his mother; Diabetes in his maternal aunt; Thyroid Disease in his brother.     Social History:  reports that he has been smoking cigarettes. He has been smoking about 0.50 packs per day. He has never used smokeless tobacco. He reports current alcohol use. He reports that he does not use drugs.    Review of Systems:     Denies numbness, tingling, parasthesias.   Denies headaches.   Denies fevers, chills, night sweats   Denies chest pain.   Denies shortness of breath.   Denies any skin problems, abrasions, rashes, irritation.      Physical Exam  GENERAL APPEARANCE: healthy, alert, no distress.   SKIN: no suspicious lesions or rashes  RESPIRATORY: No increased work of breathing.  NEURO: Normal strength and tone, mentation intact and speech normal  PSYCH:  mentation appears normal and affect normal. Not anxious.  Hands: no clubbing, nail pitting or nodes.    BP (!) 169/81   Pulse 109   Wt 80.7 kg (178 lb)   BMI 23.48 kg/m       Neck:  Full range of motion    left  UPPER EXTREMITY:      SHOULDER:  Skin intact. No abnormal discoloration  No obvious  swelling  Tender to palpation deltoid, supraspinatus  Range of motion: within normal limits  Positive impingement, mild    ELBOW:  Skin intact. No open wounds. No skin maceration.  Inspection: no swelling, no ecchymosis, no olecranon bursa swelling, no distal bicep tendon defect  Tender: lateral epicondyle, common extensor tendon and extensor muscle of forearm  Non-tender: medial epicondyle, common flexor tendon, flexor muscle of forearm, supracondylar notch, olecranon bursa and distal bicep tendon  Range of Motion: all normal  Strength: elbow strength full  Special tests: normal stability, pain with resisted wrist extension, slight pain with resisted middle finger extension, slight  pain with resisted wrist flexion  Resisted wrist extension also causes dorsal hand pain.  There is no gross deformity in the area.        X-rays: no new images today.  3 views left wrist, 2 views left forearm, [facility], from 1/6/2022 -- No obvious fractures or dislocations..    MRI left elbow 1/19/2022:  On the background of tendinosis, moderate-grade intrasubstance tear of the common extensor tendon at its proximal attachment.    Assessment: 56 year old male , right -hand dominant, with:  1)  acute left elbow pain, strain, underlying lateral epicondylitis - improving.  2) left shoulder strain - improving  3) left neck strain - improving      Plan: nonsurgical..  * glad to hear improving.  * recommend lifting with palm up, supination, as this will take some stress off the extensors.  * hand therapy for the elbow, forearm.    * counterforce elbow brace/strap  * wrist brace to prevent wrist extension  * elbow massage and icing  * shoulder range of motion, gentle  * gentle neck range of motion.  * modify lifting technique, palm upwards with lifting to relieve stress on extensor tendons of wrist.  * NDAIDS regularly three times daily x2-3 weeks  * cortisone injection discussed, place at point of maximal tenderness, discussed and  consider in future as needed .    Discussed PRP injections, out of pocket expense.  * return to clinic 4 weeks  * workability note. Light duties, no use of the left upper extremity.        Jose Daniel Valenzuela M.D., M.S.  Dept. of Orthopaedic Surgery  [facility]      Again, thank you for allowing me to participate in the care of your patient.        Sincerely,        Jose Daniel Valenzuela MD

## 2022-02-17 NOTE — LETTER
[facility] [place]  [address]  [place] [address]  Phone: [phone]  Fax: [phone]    February 17, 2022        Refugio Davies  [address] [place] [address]          To whom it may concern:    RE: Refugio Huff is under my care for a left arm injury. At this time he is able to return to work as of 2/18/2022 with the following restrictions. He is unable to push, pull or lift with his left arm. Follow up to be in scheduled in 4 weeks for updated restrictions.    Please contact me for questions or concerns.      Sincerely,        Jose Daniel Valenzuela MD

## 2022-02-17 NOTE — PROGRESS NOTES
Chief Complaint:   Chief Complaint   Patient presents with     Left Elbow - Pain     Left Forearm - Pain        HPI: Refugio Davies is a 56 year old male , right -hand dominant, who presents for followup evaluation and management of a left elbow injury. Returns today overall doing better. Still pain with reaching/lifting with palm up, but overall improving. Going to Physical Therapy,  More for the neck, not the elbow.    The injury occurred on 1/6/2022 while at work, pulling a garbage can out of the snow and felt a pop and pain down the forearm to the hand. Worked through the day with the pain. Presented to the ED that night with xrays of the forearm and wrist negative.  It has been 6 weeks since the initial injury.     Treatment has been ice, rest, heat. Occasional tylenol but doesn't care to take anything usually. No notable swelling or bruising.      Had MRI and had pain, numbness and tingling into the ring/small fingers, shoulder.    Occasional pain in the shoulder off/on since injury as well. Has also had some pain up the neck and up into the jaw/left ear.      He reports having moderate-severe pain/discomfort around the injury site. He denies numbness or tingling.   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    Patient Active Problem List   Diagnosis     CARDIOVASCULAR SCREENING; LDL GOAL LESS THAN 160     Tobacco use disorder     Other idiopathic scoliosis, lumbar region     History of pneumothorax     Neck pain     Acute pain of left shoulder     Elbow pain, left     Pain of left forearm       Past surgical history:  has a past surgical history that includes Open reduction internal fixation rodding intramedullary femur (Right, 1997) and Lung surgery (Right, 2009).     Medications: No current outpatient medications on file.    Allergies:   No Known Allergies     Family History: family history includes Breast Cancer in his mother; Diabetes in his maternal aunt; Thyroid Disease in his brother.     Social History:  reports that he has been smoking cigarettes. He has been smoking about 0.50 packs per day. He has never used smokeless tobacco. He reports current alcohol use. He reports that he does not use drugs.    Review of Systems:     Denies numbness, tingling, parasthesias.   Denies headaches.   Denies fevers, chills, night sweats   Denies chest pain.   Denies shortness of breath.   Denies any skin problems, abrasions, rashes, irritation.      Physical Exam  GENERAL APPEARANCE: healthy, alert, no distress.   SKIN: no suspicious lesions or rashes  RESPIRATORY: No increased work of breathing.  NEURO: Normal strength and tone, mentation intact and speech normal  PSYCH:  mentation appears normal and affect normal. Not anxious.  Hands: no clubbing, nail pitting or nodes.    BP (!) 169/81   Pulse 109   Wt 80.7 kg (178 lb)   BMI 23.48 kg/m       Neck:  Full range of motion    left  UPPER EXTREMITY:      SHOULDER:  Skin intact. No abnormal discoloration  No obvious swelling  Tender to palpation deltoid, supraspinatus  Range of motion: within normal limits  Positive impingement, mild    ELBOW:  Skin intact. No open wounds. No skin maceration.  Inspection: no swelling, no ecchymosis, no olecranon bursa swelling, no distal bicep  tendon defect  Tender: lateral epicondyle, common extensor tendon and extensor muscle of forearm  Non-tender: medial epicondyle, common flexor tendon, flexor muscle of forearm, supracondylar notch, olecranon bursa and distal bicep tendon  Range of Motion: all normal  Strength: elbow strength full  Special tests: normal stability, pain with resisted wrist extension, slight pain with resisted middle finger extension, slight  pain with resisted wrist flexion  Resisted wrist extension also causes dorsal hand pain.  There is no gross deformity in the area.        X-rays: no new images today.  3 views left wrist, 2 views left forearm, [facility], from 1/6/2022 -- No obvious fractures or dislocations..    MRI left elbow 1/19/2022:  On the background of tendinosis, moderate-grade intrasubstance tear of the common extensor tendon at its proximal attachment.    Assessment: 56 year old male , right -hand dominant, with:  1)  acute left elbow pain, strain, underlying lateral epicondylitis - improving.  2) left shoulder strain - improving  3) left neck strain - improving      Plan: nonsurgical..  * glad to hear improving.  * recommend lifting with palm up, supination, as this will take some stress off the extensors.  * hand therapy for the elbow, forearm.    * counterforce elbow brace/strap  * wrist brace to prevent wrist extension  * elbow massage and icing  * shoulder range of motion, gentle  * gentle neck range of motion.  * modify lifting technique, palm upwards with lifting to relieve stress on extensor tendons of wrist.  * NDAIDS regularly three times daily x2-3 weeks  * cortisone injection discussed, place at point of maximal tenderness, discussed and consider in future as needed .    Discussed PRP injections, out of pocket expense.  * return to clinic 4 weeks  * workability note. Light duties, no use of the left upper extremity.        Jose Daniel Valenzuela M.D., M.S.  Dept. of Orthopaedic Surgery  [facility]  Services

## 2022-02-24 ENCOUNTER — THERAPY VISIT (OUTPATIENT)
Dept: OCCUPATIONAL THERAPY | Facility: CLINIC | Age: 57
End: 2022-02-24
Payer: OTHER MISCELLANEOUS

## 2022-02-24 DIAGNOSIS — M25.522 ELBOW PAIN, LEFT: Primary | ICD-10-CM

## 2022-02-24 DIAGNOSIS — M77.12 LATERAL EPICONDYLITIS OF LEFT ELBOW: ICD-10-CM

## 2022-02-24 PROCEDURE — 97110 THERAPEUTIC EXERCISES: CPT | Mod: GO | Performed by: OCCUPATIONAL THERAPIST

## 2022-02-24 PROCEDURE — 97167 OT EVAL HIGH COMPLEX 60 MIN: CPT | Mod: GO | Performed by: OCCUPATIONAL THERAPIST

## 2022-02-24 PROCEDURE — 97112 NEUROMUSCULAR REEDUCATION: CPT | Mod: GO | Performed by: OCCUPATIONAL THERAPIST

## 2022-02-24 NOTE — PROGRESS NOTES
Hand Therapy Initial Evaluation    Current Date:  2/24/2022    Diagnosis: Left Elbow Pain   DOI: January 6th  DOS: None  Procedure:  None   Post:  7w 6d    Precautions: None    Subjective: It is getting better but it seems like it is taking forever to do it.   Physical Therapy Subjective Report 2/14/2022:  Patient relates that he is gradually improving with regards to his L elbow/upper extremity.  He can feel symptoms in the elbow, forearm and dorsum of the hand if lifts something;  worse if his hand is in pronation and elbow extended.  The intensity of the pain when lifting is less if he is wearing his elbow and wrist braces.  He has been consistent with his neck, shoulder, elbow and wrist exercises.  He may feel increased symptoms afterwards.  The symptoms (ache) tend to clear within an hour or less.        Refugio Davies is a 56 year old male. January 6th, while working felt a pop while pulling a garbage can down, and felt a pop. The DrMelina Said there was a torn tendon. The MRI (20 min stretch prone above head) was really painful, causing pain and numbness through the ear.     Patient reports symptoms of the left forearm which occurred due to work related lifting injury. Since onset symptoms are Gradually getting better but very slowly.  General health as reported by patient is good.  Pertinent medical history includes:Smoking  Medical allergies:none.  Surgical history: other: Femur.  Medication history: None.    Current occupation is [employer] Since August, prior to that    Job Tasks: Driving, Lifting, Carrying, Pushing, Pulling, Repetitive Tasks    Occupational Profile Information:  Right hand dominant  Prior functional level:  no limitations  Patient reports symptoms of pain  Special tests:  MRI showed tear   Previous treatment: PT [place] helped reduce neck symptoms, Brace reduced symptoms  Barriers include:none  Mobility: No difficulty  Transportation: drives  Currently not working due to  injury at work, pt is a  [employer]  Leisure activities/hobbies: Video games       Functional Outcome Measure:   61/80    Objective:  Pain Level (Scale 0-10)   2/24/2022   At Rest 2/10   With Use 6/10     Pain Description  Date 2/24/2022   Location elbow and wrist   Pain Quality Sharp and Shooting   Frequency intermittent     Pain is worst  daytime   Exacerbated by  reaching   Relieved by rest and brace/ tennis elbow strap    Progression Gradually getting better     Posture  Forward Neck Posture and Rounded Forward Shoulders, improved with PT     Sensation  WNL throughout all nerve distributions; per patient report with occasional numbness in ulnar nerve distribution     ROM  Pain Report: - none  + mild    ++ moderate    +++ severe   Shoulder WNL Triple point text ,Elbow WNL, Forearm WNL,     Elbow 2/24/2022    AROM (PROM) R    Extension +    Flexion +    Supination +    Pronation -      Wrist 2/24/2022    AROM (PROM) R    Extension +    Flexion +    RD -    UD -      Resisted Testing  Pain Report:  - none    + mild    ++ moderate    +++ severe    2/24/2022   Elbow Extension -   Elbow Flexion -   Supination  +   Pronation -   Wrist Ext with RD, Elbow at side +   Wrist Ext with UD, Elbow at side -   Wrist Ext with RD, Elbow Ext ++   Wrist Ext with UD, Elbow Ext -   Wrist Flex with RD, Elbow at side ++   Wrist Flex with UD, Elbow at side +   Wrist Flex with RD, Elbow Ext +   Wrist Flex with UD, Elbow Ext -   EDC with Elbow at side +   EDC with Elbow Ext ++   Long Finger Test +     Elbow Flexion Test -, cubital tunnel over pressure -. No subluxation of nerve  Pronator over pressure/ no reproduction of symptoms  PIN over pressure mild reproduction of symptoms      Neural Tension Testing  RNT: Radial Neurodynamic Test (based on DES Gore's ULNT)   2/24/2022     3/5 full extension pain in forearm minimal increase with 4/5     0-5 Scale 2-/5 half range     S2 5/5 can do without abduction, significant  increase in pain with abduction     Position:   0/5: Arm across abdomen in coronal plane  1/5: Depress shoulder, ER to neutral ABD shoulder to 45 degrees  2/5: IR shoulder to end range, keep elbow at 90 degrees  3/5: Extend elbow to 0 degrees  4/5: Fully pronate forearm  5/5: Flex wrist and fingers with UD  Notes:    (+) indicates beyond grade level but less than assisted to next level    (-) indicates over assisted to level    S1  onset/change of patient's symptoms    S2 definite stop point based on patient's discomfort level    Strength   (Measured in pounds)  Pain Report: - none  + mild    ++ moderate    +++ severe    2/24/2022 2/24/2022   Trials R R   1  2  3 Pain free 20 Max  55    Average         2/24/2022         Elbow Ext Pain free 15      Palpation  Pain Report:  - none    + mild    ++ moderate    +++ severe    2/24/2022   Triangular Interval -   Infraspinatus +   Teres Major -   Pec Major -   Pec Minor -    Proximal Triceps    Spiral Groove +   Distal Triceps -   Anconeus +   ECRB at LEP -   ECU at LEP -   EDC at LEP -   Radial Head -   Extensor Wad -   PIN Site ++     Assessment:  Patient presents with symptoms consistent with diagnosis of left elbow and wrist EDC strain with radial nerve irritability, with conservative intervention.     Patient's limitations or Problem List includes:  Pain of the left elbow and wrist which interferes with the patient's ability to perform Self Care Tasks (bathing), Work Tasks, Recreational Activities and Household Chores as compared to previous level of function.    Rehab Potential:  Excellent - Return to full activity, no limitations    Patient will benefit from skilled Occupational Therapy to decrease pain to return to previous activity level and resume normal daily tasks and to reach their rehab potential.    Barriers to Learning:  No barrier    Communication Issues:  Patient appears to be able to clearly communicate and understand verbal and written  communication and follow directions correctly.    Chart Review: Brief history including review of medical and/or therapy records relating to the presenting problem    Identified Performance Deficits: bathing/showering, hygiene and grooming, care of others, home establishment and management, meal preparation and cleanup, shopping, work and leisure activities    Assessment of Occupational Performance:  5 or more Performance Deficits    Clinical Decision Making (Complexity): High Complexity    Treatment Explanation:  The following has been discussed with the patient:  RX ordered/plan of care  Anticipated outcomes  Possible risks and side effects    Plan:  Frequency:  1 X week, once daily  Duration:  for 2 months    Treatment Plan:    Modalities:    Heat soft grain pack ( rice)    Therapeutic Exercise:    Tendon Gliding, Contract Relax and Isometrics extensor wad   Therapeutic Activities:  Functional activities   Lifting/ prevention reaching   Neuromuscular re-ed:   Nerve Gliding, Kinesthetic Training, Proprioceptive Training, Posture, Kinesiotaping and Isometrics  Manual Techniques:   Myofascial release  Orthotic Fabrication:    Static protective level   Self Care:    Ergonomic Considerations    Discharge Plan:  Achieve all LTG.  Independent in home treatment program.  Reach maximal therapeutic benefit.    Home Program:   Warmth for stiffness PRN  PROM with stretch to wrist extensors and flexors  Radial Nerve glide isolated proximal distal  Wrist cock up orthosis Protective level   Trial Leukotape lateral tension  Avoid reaching activities that exacerbate pain in the elbow  Gradually increase frequency and sets of nerve glide as tolerated    Next Visit: March 16-17 [place]  Progress to strengthening extensor wad  Progress radial nerve glide to mobilize nerve   Preventative education

## 2022-03-08 NOTE — PROGRESS NOTES
Chief Complaint:   Chief Complaint   Patient presents with     Left Elbow - Pain     W/C. Injury on 1/6/22, 11 wk s/p. Patient is accompanied by [facility]. Patient notes his elbow/forearm is getting better. He has not been to work because there is no light duty. Pain is not constant, but daily, usually between a 2-3.        HPI: Refugio Davies is a 56 year old male , right -hand dominant, who presents for followup evaluation and management of a left elbow injury. Returns today stating he's getting better, still sore daily, between 2-3/10. Pain is not constant, really only with reaching/lifting ( like yesterday lifting a laundry basket) but overall improving. Going to hand Therapy (for elbow) and physical therapy (for neck). No pain at rest.    The injury occurred on 1/6/2022 while at work, pulling a garbage can out of the snow and felt a pop and pain down the forearm to the hand. Worked through the day with the pain. Presented to the ED that night with xrays of the forearm and wrist negative.  It has been 11 weeks since the initial injury.     Treatment has been ice, rest, heat. Occasional tylenol but doesn't care to take anything usually. No notable swelling or bruising.        He reports having mild pain/discomfort around the injury site.   Pain severity: 2/10  Pain quality: aching, sharp and shooting  Frequency of symptoms: occasionally  Aggravated by: using the arm, lifting, grasping  Relieved by: rest    Treatment up to this point:ice, Heat, occasional Tylenol and Rest, Physical Therapy and Occupational Therapy.  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   Patient Active Problem List   Diagnosis  "    CARDIOVASCULAR SCREENING; LDL GOAL LESS THAN 160     Tobacco use disorder     Other idiopathic scoliosis, lumbar region     History of pneumothorax     Neck pain     Acute pain of left shoulder     Elbow pain, left     Pain of left forearm       Past surgical history:  has a past surgical history that includes Open reduction internal fixation rodding intramedullary femur (Right, 1997) and Lung surgery (Right, 2009).     Medications: No current outpatient medications on file.    Allergies:   No Known Allergies     Family History: family history includes Breast Cancer in his mother; Diabetes in his maternal aunt; Thyroid Disease in his brother.     Social History:  reports that he has been smoking cigarettes. He has been smoking about 0.50 packs per day. He has never used smokeless tobacco. He reports current alcohol use. He reports that he does not use drugs.    Review of Systems:     Denies numbness, tingling, parasthesias.   Denies headaches.   Denies fevers, chills, night sweats   Denies chest pain.   Denies shortness of breath.   Denies any skin problems, abrasions, rashes, irritation.      Physical Exam  GENERAL APPEARANCE: healthy, alert, no distress.   SKIN: no suspicious lesions or rashes  RESPIRATORY: No increased work of breathing.  NEURO: Normal strength and tone, mentation intact and speech normal  PSYCH:  mentation appears normal and affect normal. Not anxious.  Hands: no clubbing, nail pitting or nodes.    BP (!) 143/84   Pulse 109   Ht 1.854 m (6' 1\")   Wt 85.6 kg (188 lb 12.8 oz)   BMI 24.91 kg/m         ELBOW:  Skin intact. No open wounds. No skin maceration.  Has KT tape in place.  Inspection: no swelling, no ecchymosis, no olecranon bursa swelling, no distal bicep tendon defect  Tender: lateral epicondyle, common extensor tendon and extensor muscle of forearm, medial epicondyle.  Non-tender: common flexor tendon, flexor muscle of forearm, supracondylar notch, olecranon bursa and distal " bicep tendon  Range of Motion: all normal  Strength: elbow strength full  Special tests: normal stability, pain with resisted wrist extension, slight pain with resisted middle finger extension, slight  pain with resisted wrist flexion  Resisted wrist extension also causes dorsal hand pain.  There is no gross deformity in the area.        X-rays: no new images today.  3 views left wrist, 2 views left forearm, [facility], from 1/6/2022 -- No obvious fractures or dislocations..    MRI left elbow 1/19/2022:  On the background of tendinosis, moderate-grade intrasubstance tear of the common extensor tendon at its proximal attachment.    Assessment: 56 year old male , right -hand dominant, with:  1)  acute left elbow pain, strain, underlying lateral epicondylitis - improving.  2) left shoulder strain - improving  3) left neck strain - improving      Plan: nonsurgical..  * glad to hear improving.  * recommend lifting with palm up, supination, as this will take some stress off the extensors.  * continue hand therapy for the elbow, forearm.    * counterforce elbow brace/strap, taping.  * wrist brace to prevent wrist extension  * elbow massage and icing  * shoulder range of motion, gentle  * gentle neck range of motion.  * modify lifting technique, palm upwards with lifting to relieve stress on extensor tendons of wrist.  * NDAIDS regularly three times daily x2-3 weeks  * cortisone injection discussed, place at point of maximal tenderness, discussed and consider in future as needed .    Discussed PRP injections, out of pocket expense.    * will refer to Sports Medicine for image-guided, left elbow lateral epicondyle PRP injection.  * return to clinic 4 weeks, or 4 weeks after injection, whichever is sooner.  * workability note. Light duties, no use of the left upper extremity.        Jose Daniel Valenzuela M.D., M.S.  Dept. of Orthopaedic Surgery  [facility]

## 2022-03-17 ENCOUNTER — THERAPY VISIT (OUTPATIENT)
Dept: OCCUPATIONAL THERAPY | Facility: CLINIC | Age: 57
End: 2022-03-17
Payer: OTHER MISCELLANEOUS

## 2022-03-17 DIAGNOSIS — M25.522 ELBOW PAIN, LEFT: ICD-10-CM

## 2022-03-17 PROCEDURE — 97530 THERAPEUTIC ACTIVITIES: CPT | Mod: GO | Performed by: OCCUPATIONAL THERAPIST

## 2022-03-17 PROCEDURE — 97112 NEUROMUSCULAR REEDUCATION: CPT | Mod: GO | Performed by: OCCUPATIONAL THERAPIST

## 2022-03-17 NOTE — PROGRESS NOTES
Hand SOAP Note    Current Date:  3/17/2022    Diagnosis: Left Elbow Pain   DOI: January 6th  DOS: None  Procedure:  None   Post:  8w 6d    Precautions: None    Subjective: It is a lot getting better, I see the DrMelina again in a week or so. I am hoping to return to work. .           Refugio Davies is a 56 year old male. January 6th, while working felt a pop while pulling a garbage can down, and felt a pop. The DrMelina Said there was a torn tendon. The MRI (20 min stretch prone above head) was really painful, causing pain and numbness through the ear.     Patient reports symptoms of the left forearm which occurred due to work related lifting injury. Since onset symptoms are Gradually getting better but very slowly.  General health as reported by patient is good.  Pertinent medical history includes:Smoking  Medical allergies:none.  Surgical history: other: Femur.  Medication history: None.    Current occupation is [employer] Since August, prior to that    Job Tasks: Driving, Lifting, Carrying, Pushing, Pulling, Repetitive Tasks    Occupational Profile Information:  Right hand dominant  Prior functional level:  no limitations  Patient reports symptoms of pain  Special tests:  MRI showed tear   Previous treatment: PT [place] helped reduce neck symptoms, Brace reduced symptoms  Barriers include:none  Mobility: No difficulty  Transportation: drives  Currently not working due to injury at work, pt is a  [employer]  Leisure activities/hobbies: Video games       Functional Outcome Measure:   61/80    Objective:  S; I hope to go back to work soon.   Pain Level (Scale 0-10)   2/24/2022 3/17/2022   At Rest 2/10 0/10   With Use 6/10 2/10     Pain Description  Date 2/24/2022   Location elbow and wrist   Pain Quality Sharp and Shooting   Frequency intermittent     Pain is worst  daytime   Exacerbated by  reaching   Relieved by rest and brace/ tennis elbow strap    Progression Gradually getting  better     Posture  Forward Neck Posture and Rounded Forward Shoulders, improved with PT     Sensation  WNL throughout all nerve distributions; per patient report with occasional numbness in ulnar nerve distribution after tx     ROM  Pain Report: - none  + mild    ++ moderate    +++ severe   Shoulder WNL Triple point text ,Elbow WNL, Forearm WNL,     Elbow 2/24/2022 3/17/2022   AROM (PROM) L L    Extension + -   Flexion + -   Supination + -   Pronation - -     Wrist 2/24/2022 3/17/2022   AROM (PROM) L L   Extension + -   Flexion + -   RD - -   UD - -     Resisted Testing  Pain Report:  - none    + mild    ++ moderate    +++ severe    2/24/2022   Elbow Extension -   Elbow Flexion -   Supination  +   Pronation -   Wrist Ext with RD, Elbow at side +   Wrist Ext with UD, Elbow at side -   Wrist Ext with RD, Elbow Ext ++   Wrist Ext with UD, Elbow Ext -   Wrist Flex with RD, Elbow at side ++   Wrist Flex with UD, Elbow at side +   Wrist Flex with RD, Elbow Ext +   Wrist Flex with UD, Elbow Ext -   EDC with Elbow at side +   EDC with Elbow Ext ++   Long Finger Test +     Elbow Flexion Test -, cubital tunnel over pressure -. No subluxation of nerve  Pronator over pressure/ no reproduction of symptoms  PIN over pressure mild reproduction of symptoms      Neural Tension Testing  RNT: Radial Neurodynamic Test (based on DES Gore's ULNT)   2/24/2022 3/17/2022    3/5 full extension pain in forearm minimal increase with 4/5     0-5 Scale 2-/5 half range     S2 5/5 can do without abduction, significant increase in pain with abduction     Position:   0/5: Arm across abdomen in coronal plane  1/5: Depress shoulder, ER to neutral ABD shoulder to 45 degrees  2/5: IR shoulder to end range, keep elbow at 90 degrees  3/5: Extend elbow to 0 degrees  4/5: Fully pronate forearm  5/5: Flex wrist and fingers with UD  Notes:    (+) indicates beyond grade level but less than snf to next level    (-) indicates over snf to level    S1   onset/change of patient's symptoms    S2 definite stop point based on patient's discomfort level    Strength   (Measured in pounds)  Pain Report: - none  + mild    ++ moderate    +++ severe    2/24/2022 2/24/2022   Trials R R   1  2  3 Pain free 20 Max  55    Average         2/24/2022         Elbow Ext Pain free 15      Palpation  Pain Report:  - none    + mild    ++ moderate    +++ severe    2/24/2022   Triangular Interval -   Infraspinatus +   Teres Major -   Pec Major -   Pec Minor -    Proximal Triceps    Spiral Groove +   Distal Triceps -   Anconeus +   ECRB at LEP -   ECU at LEP -   EDC at LEP -   Radial Head -   Extensor Wad -   PIN Site ++     Assessment:  Patient presents with symptoms consistent with diagnosis of left elbow and wrist EDC strain with radial nerve irritability, improving noted by reduced pain and increased mobility with conservative intervention.     Patient's limitations or Problem List includes:  Pain of the left elbow and wrist which interferes with the patient's ability to perform Self Care Tasks (bathing), Work Tasks, Recreational Activities and Household Chores as compared to previous level of function.    Rehab Potential:  Excellent - Return to full activity, no limitations    Patient will benefit from skilled Occupational Therapy to decrease pain to return to previous activity level and resume normal daily tasks and to reach their rehab potential.    Barriers to Learning:  No barrier    Communication Issues:  Patient appears to be able to clearly communicate and understand verbal and written communication and follow directions correctly.    Chart Review: Brief history including review of medical and/or therapy records relating to the presenting problem    Identified Performance Deficits: bathing/showering, hygiene and grooming, care of others, home establishment and management, meal preparation and cleanup, shopping, work and leisure activities    Assessment of  Occupational Performance:  5 or more Performance Deficits    Clinical Decision Making (Complexity): High Complexity    Treatment Explanation:  The following has been discussed with the patient:  RX ordered/plan of care  Anticipated outcomes  Possible risks and side effects    Plan:  Frequency:  1 X week, once daily  Duration:  for 2 months    Treatment Plan:    Modalities:    Heat soft grain pack ( rice)    Therapeutic Exercise:    Tendon Gliding, Contract Relax and Isometrics extensor wad   Therapeutic Activities:  Functional activities   Lifting/ prevention reaching   Neuromuscular re-ed:   Nerve Gliding, Kinesthetic Training, Proprioceptive Training, Posture, Kinesiotaping and Isometrics  Manual Techniques:   Myofascial release  Orthotic Fabrication:    Static protective level   Self Care:    Ergonomic Considerations    Discharge Plan:  Achieve all LTG.  Independent in home treatment program.  Reach maximal therapeutic benefit.    Home Program:   Warmth for stiffness PRN  PROM with stretch to wrist extensors and flexors  Radial Nerve glide isolated proximal distal upgraded to full glide  Wrist cock up orthosis Protective level   Leukotape lateral tension on HP  Avoid reaching activities that exacerbate pain in the elbow  Gradually increase frequency and sets of nerve glide as tolerated    Next Visit:  Mohsen  Progress to strengthening extensor wad  Progress to distal radial nerve glide to mobilize nerve   Preventative education

## 2022-03-23 ENCOUNTER — OFFICE VISIT (OUTPATIENT)
Dept: ORTHOPEDICS | Facility: CLINIC | Age: 57
End: 2022-03-23
Payer: OTHER MISCELLANEOUS

## 2022-03-23 VITALS
WEIGHT: 188.8 LBS | HEIGHT: 73 IN | DIASTOLIC BLOOD PRESSURE: 84 MMHG | SYSTOLIC BLOOD PRESSURE: 143 MMHG | HEART RATE: 109 BPM | BODY MASS INDEX: 25.02 KG/M2

## 2022-03-23 DIAGNOSIS — Z02.6 ENCOUNTER RELATED TO WORKER'S COMPENSATION CLAIM: ICD-10-CM

## 2022-03-23 DIAGNOSIS — M77.12 LATERAL EPICONDYLITIS OF LEFT ELBOW: Primary | ICD-10-CM

## 2022-03-23 PROCEDURE — 99213 OFFICE O/P EST LOW 20 MIN: CPT | Performed by: ORTHOPAEDIC SURGERY

## 2022-03-23 ASSESSMENT — PAIN SCALES - GENERAL: PAINLEVEL: MILD PAIN (2)

## 2022-03-23 NOTE — LETTER
3/23/2022         RE: Refugio Davies  [address] [address] [facility] [place] [address]        Dear Colleague,    Thank you for referring your patient, Refugio Davies, to the [facility] [place]. Please see a copy of my visit note below.    Chief Complaint:   Chief Complaint   Patient presents with     Left Elbow - Pain     W/C. Injury on 1/6/22, 11 wk s/p. Patient is accompanied by C. Patient notes his elbow/forearm is getting better. He has not been to work because there is no light duty. Pain is not constant, but daily, usually between a 2-3.        HPI: Refugio Davies is a 56 year old male , right -hand dominant, who presents for followup evaluation and management of a left elbow injury. Returns today stating he's getting better, still sore daily, between 2-3/10. Pain is not constant, really only with reaching/lifting ( like yesterday lifting a laundry basket) but overall improving. Going to hand Therapy (for elbow) and physical therapy (for neck). No pain at rest.    The injury occurred on 1/6/2022 while at work, pulling a garbage can out of the snow and felt a pop and pain down the forearm to the hand. Worked through the day with the pain. Presented to the ED that night with xrays of the forearm and wrist negative.  It has been 11 weeks since the initial injury.     Treatment has been ice, rest, heat. Occasional tylenol but doesn't care to take anything usually. No notable swelling or bruising.        He reports having mild pain/discomfort around the injury site.   Pain severity: 2/10  Pain quality: aching, sharp and shooting  Frequency of symptoms: occasionally  Aggravated by: using the arm, lifting, grasping  Relieved by: rest    Treatment up to this point:ice, Heat, occasional Tylenol and Rest, Physical Therapy and Occupational Therapy.  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   Patient Active Problem List   Diagnosis     CARDIOVASCULAR SCREENING; LDL GOAL LESS THAN 160     Tobacco use disorder     Other idiopathic scoliosis, lumbar region     History of pneumothorax     Neck pain     Acute pain of left shoulder     Elbow pain, left     Pain of left forearm       Past surgical history:  has a past surgical history that includes Open reduction internal fixation rodding intramedullary femur (Right, 1997) and Lung surgery (Right, 2009).     Medications: No current outpatient medications on file.    Allergies:   No Known Allergies     Family History: family history includes Breast Cancer in his mother; Diabetes in his maternal aunt; Thyroid Disease in his brother.     Social History:  reports that he has been smoking cigarettes. He has been smoking about 0.50 packs per day. He has never used smokeless tobacco. He reports current alcohol use. He reports that he does not use drugs.    Review of Systems:     Denies numbness, tingling, parasthesias.   Denies headaches.   Denies fevers, chills, night sweats   Denies chest pain.   Denies shortness of breath.   Denies any skin problems, abrasions, rashes, irritation.      Physical Exam  GENERAL APPEARANCE: healthy, alert, no distress.   SKIN: no suspicious lesions or rashes  RESPIRATORY: No increased work of breathing.  NEURO: Normal strength and tone, mentation intact and speech normal  PSYCH:  mentation appears normal and affect normal. Not anxious.  Hands: no clubbing, nail pitting or nodes.    BP (!) 143/84   Pulse 109   Ht 1.854 m (6' 1\")   Wt 85.6 kg (188 lb 12.8 oz)   BMI 24.91 kg/m         ELBOW:  Skin intact. No open wounds. No skin maceration.  Has KT tape in place.  Inspection: no swelling, no ecchymosis, no olecranon " bursa swelling, no distal bicep tendon defect  Tender: lateral epicondyle, common extensor tendon and extensor muscle of forearm, medial epicondyle.  Non-tender: common flexor tendon, flexor muscle of forearm, supracondylar notch, olecranon bursa and distal bicep tendon  Range of Motion: all normal  Strength: elbow strength full  Special tests: normal stability, pain with resisted wrist extension, slight pain with resisted middle finger extension, slight  pain with resisted wrist flexion  Resisted wrist extension also causes dorsal hand pain.  There is no gross deformity in the area.        X-rays: no new images today.  3 views left wrist, 2 views left forearm, [facility], from 1/6/2022 -- No obvious fractures or dislocations..    MRI left elbow 1/19/2022:  On the background of tendinosis, moderate-grade intrasubstance tear of the common extensor tendon at its proximal attachment.    Assessment: 56 year old male , right -hand dominant, with:  1)  acute left elbow pain, strain, underlying lateral epicondylitis - improving.  2) left shoulder strain - improving  3) left neck strain - improving      Plan: nonsurgical..  * glad to hear improving.  * recommend lifting with palm up, supination, as this will take some stress off the extensors.  * continue hand therapy for the elbow, forearm.    * counterforce elbow brace/strap, taping.  * wrist brace to prevent wrist extension  * elbow massage and icing  * shoulder range of motion, gentle  * gentle neck range of motion.  * modify lifting technique, palm upwards with lifting to relieve stress on extensor tendons of wrist.  * NDAIDS regularly three times daily x2-3 weeks  * cortisone injection discussed, place at point of maximal tenderness, discussed and consider in future as needed .    Discussed PRP injections, out of pocket expense.    * will refer to Sports Medicine for image-guided, left elbow lateral epicondyle PRP injection.  * return to clinic 4 weeks, or 4 weeks  after injection, whichever is sooner.  * workability note. Light duties, no use of the left upper extremity.        Jose Daniel Valenzuela M.D., M.S.  Dept. of Orthopaedic Surgery  [facility]      Again, thank you for allowing me to participate in the care of your patient.        Sincerely,        Jose Daniel Valenzuela MD

## 2022-03-25 NOTE — PROGRESS NOTES
ASSESSMENT & PLAN    Refugio was seen today for pain.    Diagnoses and all orders for this visit:    Lateral epicondylitis of left elbow  -     Orthopedic  Referral    Encounter related to worker's compensation claim  -     Orthopedic  Referral      This issue is acute and Improving.    # Right Lateral Epicondylosis: Symptoms noted after work injury on 1/6/2022 with pain over the lateral left forearm.  He does have pain worse with gripping and resisted wrist extension.  Reviewed previous MRI showing tendinosis and small tear of the common extensor tendon.  He was referred here for platelet rich plasma injection.  Given this is done through work comp will confirm approval prior to proceeding.  Plan otherwise as below.  Image Findings: Reviewed previous MRI showing tendinosis of the common extensor tendon and partial tear  Treatment: Activities as tolerated, continue physical therapy  Job: Per Dr. Valenzuela  Medications/Injections: Pending PRP injection approval confirmation  Follow-up: 4/5/22 at 8:40 please make appointment at .    Panchito Miranda MD  [facility] [place]    -----  Chief Complaint   Patient presents with     Left Elbow - Pain       SUBJECTIVE  Refugio Davies is a/an 56 year old male who is seen in consultation at the request of  Jose Daniel Valenzuela M.D. for evaluation of left elbow pain.     The patient is seen by themselves.  The patient is Right handed    Onset: W/C injury on 1/6/2022 when he was working and moving a heavy garbage can when he felt a pop and abrupt onset of pain in the left forearm.  Location of Pain: lateral elbow with some medial elbow pain.   Worsened by: any gripping motions  Better with: heat and physical therapy  Treatments tried: physical therapy, ice, heat  Associated symptoms: achiness over lateral elbow, tingling into all 5 left digits    Orthopedic/Surgical history: No  Social History/Occupation: Currently not working  due to no lightduty available.    No family history pertinent to patient's problem today.      REVIEW OF SYSTEMS:  Review of Systems  Constitutional, HEENT, cardiovascular, pulmonary, gi and gu systems are negative, except as otherwise noted.    OBJECTIVE:  BP (!) 151/74   Wt 85.6 kg (188 lb 12.8 oz)   BMI 24.91 kg/m     General: healthy, alert and in no distress  HEENT: no scleral icterus or conjunctival erythema  Skin: no suspicious lesions or rash. No jaundice.  CV: distal perfusion intact    Resp: normal respiratory effort without conversational dyspnea   Psych: normal mood and affect  Gait: normal steady gait with appropriate coordination and balance    Neuro: Normal light sensory exam of right upper extremity     RIGHT ELBOW  Inspection:    No swelling, bruising, discoloration, or obvious deformity or asymmetry  Palpation:    Tender about the lateral epicondyle, common extensor tendon, medial epicondyle, common flexor tendon. Remainder of bony, ligamentous and tendinous landmarks are nontender.    Crepitus is Absent  Range of Motion:     Extension full / flexion full / pronation full / supination full  Strength:    Flexion full extension full pronation full supination limited slightly by pain  Special Tests:    Positive: Pain with resisted wrist extension, pain with resisted middle finger extension, pain with resisted supination    Negative: pain with resisted wrist flexion, pain with resisted pronation, cubital tunnel (ulnar Tinel's)        RADIOLOGY:  I independently, visualized and reviewed these images with the patient  Right elbow MRI reviewed    MRI of left elbow without  contrast 1/19/2022 8:28 AM     History: Encounter related to worker's compensation claim; Left elbow  pain; Pain of left forearm     Techniques: Multiplanar multisequence imaging through the left elbow  were obtained without administration of intravenous gadolinium  contrast.     Additional History from EMR: Patient moving heavy  garbage and felt  pop/abrupt onset of pain in the left forearm.     Comparison: None.     Findings:     Loss of signal-to-noise ratio particularly coronal and sagittal T2 fat  suppressed sequences are likely due to switching coil in middle of  study due to patient's pain.     Medial compartment  Ulnar collateral ligament: Intact     Common flexor tendon: Intact without tendinosis     Medial epicondyle: Mild edema-like marrow signal intensity posterior  aspect of the humeral epicondyles, may be related to incomplete fat  suppression. No associated tendon pathology.     Lateral compartment  Radial collateral ligament: Intact     Lateral ulnar collateral ligament: Intact.     Radial annular ligament: Intact.     Common extensor tendon: On a background of tendinosis, moderate grade  intrasubstance tear of the common extensor tendon at the proximal  attachment.     Lateral epicondyle: No edema.     Posterior compartment  Triceps: Intact without tendinosis.     Olecranon: No edema.     Bursitis: No significant fluid.     Anterior compartment  Biceps: Tendinosis distally.     Brachialis: Tendinosis.     Bicipitoradial bursa: No significant fluid.     Articulations  No evidence of erosion. No significant effusion.     Bones (other than subarticular marrow): No evidence of fracture,  marrow contusion or marrow infiltrative change.     Others:     Nerves: Ulnar nerve and cubital tunnel are normal.                                                                      Impression:  Loss of signal-to-noise ratio particularly coronal and sagittal T2 fat  suppressed sequences are likely due to switching coil in middle of  study due to patient's pain.  1. On the background of tendinosis, moderate-grade intrasubstance tear  of the common extensor tendon at its proximal attachment.     I have personally reviewed the examination and initial interpretation  and I agree with the findings.     RENETTA RICO       Review of external  notes as documented elsewhere in note  Review of the result(s) of each unique test - right elbow MRI

## 2022-03-29 ENCOUNTER — OFFICE VISIT (OUTPATIENT)
Dept: ORTHOPEDICS | Facility: CLINIC | Age: 57
End: 2022-03-29
Attending: ORTHOPAEDIC SURGERY
Payer: OTHER MISCELLANEOUS

## 2022-03-29 VITALS — DIASTOLIC BLOOD PRESSURE: 74 MMHG | SYSTOLIC BLOOD PRESSURE: 151 MMHG | BODY MASS INDEX: 24.91 KG/M2 | WEIGHT: 188.8 LBS

## 2022-03-29 DIAGNOSIS — M77.12 LATERAL EPICONDYLITIS OF LEFT ELBOW: ICD-10-CM

## 2022-03-29 DIAGNOSIS — Z02.6 ENCOUNTER RELATED TO WORKER'S COMPENSATION CLAIM: ICD-10-CM

## 2022-03-29 PROCEDURE — 99203 OFFICE O/P NEW LOW 30 MIN: CPT | Performed by: FAMILY MEDICINE

## 2022-03-29 ASSESSMENT — PAIN SCALES - GENERAL: PAINLEVEL: MILD PAIN (3)

## 2022-03-29 NOTE — PATIENT INSTRUCTIONS
# Right Lateral Epicondylosis: Symptoms noted after work injury on 1/6/2022 with pain over the lateral left forearm.  He does have pain worse with gripping and resisted wrist extension.  Reviewed previous MRI showing tendinosis and small tear of the common extensor tendon.  He was referred here for platelet rich plasma injection.  Given this is done through work comp will confirm approval prior to proceeding.  Plan otherwise as below.  Image Findings: Reviewed previous MRI showing tendinosis of the common extensor tendon and partial tear  Treatment: Activities as tolerated, continue physical therapy  Job: Per Dr. Valenzuela  Medications/Injections: Pending PRP injection approval confirmation  Follow-up: 4/5/22 at 8:40 please make appointment at .    Please call [phone]   Ask for my team if you have any questions or concerns    If you have not yet received the influenza vaccine but would like to get one, please call  [phone] or you can schedule via [employer]    It was great seeing you today!    Panchito Miranda MD, [facility]     PRP (Platelet Rich Plasma) Pre-Procedure Instructions  1. Please stop all aspirin, aspirin products and all non steroidal anti-inflammatories (Motrin, Advil, Naprosyn, Naproxen, Aleve, Indocin, Mobic, Toradol, Voltaren, Arthrotec, Ibuprofen, Diclofenac) 7 days prior to the procedure  2. If you take fish oil or vitamin E please stop 5 days prior to the procedure  3. If you take Coumadin, Lovenox, Warfarin, Pradaxa, Plavix, Prasugrel or any other blood thinner please discuss this with Dr. Delgado. These medications will need to be stopped prior to the procedure and requires the permission of the physician prescribing them.  4. Begin to hydrate by drinking 8 glasses (64 ounces) of water in the 24 hours prior to your procedure.  5. For your safety you should arrange to have someone drive you home after the procedure.  6. Please note that PRP is an out-of-pocket expense and you will  be charged $450/joint at the time of your injection.  Code is 0232T - if you want to check with your insurance regarding reimbursement to you.    You will have increased pain after the procedure and will be given a narcotic pain medication to help with pain. No alcohol or driving while taking this medication. Otherwise you can use Tylenol for pain. No anti-inflammatories for 2 weeks after the PRP injection  You will see me one week after your injection and we will plan to start formal physical therapy 1-2 weeks after the injection  Will will see how your pain and function are approximately 6-8 weeks from the procedure. The decision to do another injection, if necessary, will not be determined until this time.    For elbow tendinopathy / tearing  1. You will be in a sling for 3 - 7 days. Gentle range of motion out of the sling is necessary / encouraged.  2. No lifting more than a glass of water for first 3 days, no more than a pot of coffee from 3- 7 days.    3. No strength training, hitting or tennis for at least 6-8 weeks    Follow up as desired for a PRP injection (sooner if needed; call direct clinic number [[phone]] at any time with questions or concerns)

## 2022-03-29 NOTE — LETTER
3/29/2022         RE: Refugio Davies  [address] [facility] [address]        Dear Colleague,    Thank you for referring your patient, Refugio Davies, to the [facility]. Please see a copy of my visit note below.    ASSESSMENT & PLAN    Refugio was seen today for pain.    Diagnoses and all orders for this visit:    Lateral epicondylitis of left elbow  -     Orthopedic  Referral    Encounter related to worker's compensation claim  -     Orthopedic  Referral      This issue is acute and Improving.    # Right Lateral Epicondylosis: Symptoms noted after work injury on 1/6/2022 with pain over the lateral left forearm.  He does have pain worse with gripping and resisted wrist extension.  Reviewed previous MRI showing tendinosis and small tear of the common extensor tendon.  He was referred here for platelet rich plasma injection.  Given this is done through work comp will confirm approval prior to proceeding.  Plan otherwise as below.  Image Findings: Reviewed previous MRI showing tendinosis of the common extensor tendon and partial tear  Treatment: Activities as tolerated, continue physical therapy  Job: Per Dr. Valenzuela  Medications/Injections: Pending PRP injection approval confirmation  Follow-up: 4/5/22 at 8:40 please make appointment at .    Panchito Miranda MD  [facility]    -----  Chief Complaint   Patient presents with     Left Elbow - Pain       SUBJECTIVE  Refugio Davies is a/an 56 year old male who is seen in consultation at the request of  Jose Daniel Valenzuela M.D. for evaluation of left elbow pain.     The patient is seen by themselves.  The patient is Right handed    Onset: W/C injury on 1/6/2022 when he was working and moving a heavy garbage can when he felt a pop and abrupt onset of pain in the left forearm.  Location of Pain: lateral elbow with some medial elbow pain.   Worsened by: any  gripping motions  Better with: heat and physical therapy  Treatments tried: physical therapy, ice, heat  Associated symptoms: achiness over lateral elbow, tingling into all 5 left digits    Orthopedic/Surgical history: No  Social History/Occupation: Currently not working due to no lightduty available.    No family history pertinent to patient's problem today.      REVIEW OF SYSTEMS:  Review of Systems  Constitutional, HEENT, cardiovascular, pulmonary, gi and gu systems are negative, except as otherwise noted.    OBJECTIVE:  BP (!) 151/74   Wt 85.6 kg (188 lb 12.8 oz)   BMI 24.91 kg/m     General: healthy, alert and in no distress  HEENT: no scleral icterus or conjunctival erythema  Skin: no suspicious lesions or rash. No jaundice.  CV: distal perfusion intact    Resp: normal respiratory effort without conversational dyspnea   Psych: normal mood and affect  Gait: normal steady gait with appropriate coordination and balance    Neuro: Normal light sensory exam of right upper extremity     RIGHT ELBOW  Inspection:    No swelling, bruising, discoloration, or obvious deformity or asymmetry  Palpation:    Tender about the lateral epicondyle, common extensor tendon, medial epicondyle, common flexor tendon. Remainder of bony, ligamentous and tendinous landmarks are nontender.    Crepitus is Absent  Range of Motion:     Extension full / flexion full / pronation full / supination full  Strength:    Flexion full extension full pronation full supination limited slightly by pain  Special Tests:    Positive: Pain with resisted wrist extension, pain with resisted middle finger extension, pain with resisted supination    Negative: pain with resisted wrist flexion, pain with resisted pronation, cubital tunnel (ulnar Tinel's)        RADIOLOGY:  I independently, visualized and reviewed these images with the patient  Right elbow MRI reviewed    MRI of left elbow without  contrast 1/19/2022 8:28 AM     History: Encounter related to  worker's compensation claim; Left elbow  pain; Pain of left forearm     Techniques: Multiplanar multisequence imaging through the left elbow  were obtained without administration of intravenous gadolinium  contrast.     Additional History from EMR: Patient moving heavy garbage and felt  pop/abrupt onset of pain in the left forearm.     Comparison: None.     Findings:     Loss of signal-to-noise ratio particularly coronal and sagittal T2 fat  suppressed sequences are likely due to switching coil in middle of  study due to patient's pain.     Medial compartment  Ulnar collateral ligament: Intact     Common flexor tendon: Intact without tendinosis     Medial epicondyle: Mild edema-like marrow signal intensity posterior  aspect of the humeral epicondyles, may be related to incomplete fat  suppression. No associated tendon pathology.     Lateral compartment  Radial collateral ligament: Intact     Lateral ulnar collateral ligament: Intact.     Radial annular ligament: Intact.     Common extensor tendon: On a background of tendinosis, moderate grade  intrasubstance tear of the common extensor tendon at the proximal  attachment.     Lateral epicondyle: No edema.     Posterior compartment  Triceps: Intact without tendinosis.     Olecranon: No edema.     Bursitis: No significant fluid.     Anterior compartment  Biceps: Tendinosis distally.     Brachialis: Tendinosis.     Bicipitoradial bursa: No significant fluid.     Articulations  No evidence of erosion. No significant effusion.     Bones (other than subarticular marrow): No evidence of fracture,  marrow contusion or marrow infiltrative change.     Others:     Nerves: Ulnar nerve and cubital tunnel are normal.                                                                      Impression:  Loss of signal-to-noise ratio particularly coronal and sagittal T2 fat  suppressed sequences are likely due to switching coil in middle of  study due to patient's pain.  1. On the  background of tendinosis, moderate-grade intrasubstance tear  of the common extensor tendon at its proximal attachment.     I have personally reviewed the examination and initial interpretation  and I agree with the findings.     RENETTA RICO       Review of external notes as documented elsewhere in note  Review of the result(s) of each unique test - right elbow MRI          Again, thank you for allowing me to participate in the care of your patient.        Sincerely,        Panchito Miranda MD

## 2022-03-31 ENCOUNTER — THERAPY VISIT (OUTPATIENT)
Dept: OCCUPATIONAL THERAPY | Facility: CLINIC | Age: 57
End: 2022-03-31
Payer: OTHER MISCELLANEOUS

## 2022-03-31 DIAGNOSIS — M25.522 ELBOW PAIN, LEFT: ICD-10-CM

## 2022-03-31 PROCEDURE — 97110 THERAPEUTIC EXERCISES: CPT | Mod: GO | Performed by: OCCUPATIONAL THERAPIST

## 2022-03-31 PROCEDURE — 97140 MANUAL THERAPY 1/> REGIONS: CPT | Mod: GO | Performed by: OCCUPATIONAL THERAPIST

## 2022-03-31 PROCEDURE — 97112 NEUROMUSCULAR REEDUCATION: CPT | Mod: GO | Performed by: OCCUPATIONAL THERAPIST

## 2022-03-31 NOTE — PROGRESS NOTES
Hand Therapy Progress Note    Current Date:  3/31/2022    Reporting period is 2/24/2022 to 3/31/2022    Diagnosis: Left Elbow Pain   DOI: 1/6/2022  Therapy referral: 2/24/2022    Subjective:   Subjective changes noted by patient:  Pt scheduled for PRP injection 4/5/2022.  The elbow is about the same, overall better but it is still sore at times.  Functional changes noted by patient:  Improvement in Household Chores  Patient has noted adverse reaction to:  None    Objective:  Pain Level (Scale 0-10)   2/24/2022 3/31/2022   At Rest 2/10 0/10   With Use 6/10 2-5/10     Pain Description  Date 2/24/2022 3/31/2022   Location elbow and wrist Elbow and forearm   Pain Quality Sharp and Shooting Sharp   Frequency intermittent   intermittent     Pain is worst  daytime daytime   Exacerbated by  reaching Holding in supination, lifting, reaching   Relieved by rest and brace/ tennis elbow strap  Rest, arm band   Progression Gradually getting better Gradually improving      Posture  Forward Neck Posture and Rounded Forward Shoulders    Sensation  WNL throughout all nerve distributions; per patient report with continued occasional numbness in ulnar nerve distribution     ROM  Pain Report: - none  + mild    ++ moderate    +++ severe   Shoulder ,Elbow and Forearm WNL,    Elbow 2/24/2022 3/31/2022   AROM (PROM) R R   Extension + +   Flexion + -   Supination + + slight   Pronation - -     Wrist 2/24/2022 3/31/2022   AROM (PROM) R R   Extension + -   Flexion + + slight   RD - -   UD - -     Resisted Testing  Pain Report:  - none    + mild    ++ moderate    +++ severe    2/24/2022 3/31/2022   Elbow Extension - -   Elbow Flexion - -   Supination  + + slight   Pronation - -   Wrist Ext with RD, Elbow Ext ++ ++   Wrist Ext with UD, Elbow Ext - +   Wrist Flex with RD, Elbow Ext + -   Wrist Flex with UD, Elbow Ext - -   EDC with Elbow Ext ++ -   Long Finger Test + -     Strength   (Measured in pounds)  Pain Report: - none  + mild    ++  moderate    +++ severe    2/24/2022 2/24/2022 3/31/2022 3/31/2022   Trials   R L   1 Pain free 20 Max  55  100 38+       Palpation  Pain Report:  - none    + mild    ++ moderate    +++ severe    2/24/2022 3/31/2022   Triangular Interval - NT   Infraspinatus + NT   Teres Major - NT   Pec Major - -   Pec Minor -  NT   Proximal Triceps  -   Spiral Groove + -   Distal Triceps - -   Anconeus + -   ECRB at LEP - +   ECU at LEP - -   EDC at LEP - +   Radial Head - -   Extensor Wad - -   PIN Site ++ + slight     Please refer to the daily flowsheet for treatment provided today.     Assessment:  Response to therapy has been improvement to:  Flexibility:  less tightness in involved muscles  Strength:    Pain:  intensity of pain is decreased    Overall Assessment:  Patient is progressing slowly.  Patient would benefit from continued therapy to achieve rehab potential follow PRP injection.  STG/LTG:  STGoals have been reviewed and progress or achievement has occurred;  see goal sheet for details and updates.  I have re-evaluated this patient and find that the nature, scope, duration and intensity of the therapy is appropriate for the medical condition of the patient.    Plan:  Frequency/Duration:  Recommend continuing with the current treatment plan. 1 X week, once daily  for 8 total visits    Recommendations for Continued Therapy  Treatment Plan -    Modalities:  US  Therapeutic Exercise:  AROM, PROM and Isotonics  Neuromuscular re-education:  Nerve Gliding, Posture and Kinesiotaping  Manual Techniques:  Friction massage and Myofascial release  Orthotic Fabrication:  Static Forearm based  Self Care:  Self Care Tasks, Ergonomic Considerations and Diagnostic Education     Home Program:   Warmth for stiffness to forearm extensors  Ice to LEP after activity for pain  TFM to LEP  Self MFR to forearm extensors with tennis ball, avoid PIN site  PROM with stretch to wrist extensors and flexors  Eccentric wrist extension  strengthening   Radial nerve glide   OTC wrist splint sleeping  Leukotape at PIN site  Trial k-tape to LEP, consider transitioning to k-tape at PIN site  Avoid reaching activities that exacerbate pain in the elbow    Next Visit:    Await PRP injection

## 2022-04-05 ENCOUNTER — OFFICE VISIT (OUTPATIENT)
Dept: ORTHOPEDICS | Facility: CLINIC | Age: 57
End: 2022-04-05
Payer: OTHER MISCELLANEOUS

## 2022-04-05 DIAGNOSIS — Z02.6 ENCOUNTER RELATED TO WORKER'S COMPENSATION CLAIM: ICD-10-CM

## 2022-04-05 DIAGNOSIS — M77.12 LATERAL EPICONDYLITIS OF LEFT ELBOW: Primary | ICD-10-CM

## 2022-04-05 PROCEDURE — 0232T NJX PLATELET PLASMA: CPT | Mod: LT | Performed by: FAMILY MEDICINE

## 2022-04-05 RX ORDER — HYDROCODONE BITARTRATE AND ACETAMINOPHEN 5; 325 MG/1; MG/1
1 TABLET ORAL EVERY 6 HOURS PRN
Qty: 10 TABLET | Refills: 0 | Status: SHIPPED | OUTPATIENT
Start: 2022-04-05 | End: 2022-04-08

## 2022-04-05 NOTE — LETTER
2022         RE: Refugio Davies  [address]  [facility] [address]        Dear Colleague,    Thank you for referring your patient, Refugio Davies, to the [facility]. Please see a copy of my visit note below.    Refugio Davies  :  1965  DOS: 2022  MRN: [id number]    Sports Medicine Clinic Procedure      Ultrasound Guided left lateral epicondylitis PRP Injection      Refugio Davies has elected to receive a left Elbow  lateral epicondlyitis PRP injection to help with modulation of pain and to promote healing. Sonographic guidance will be used to ensure accurate placement of the medication into the lateral extensor mass.      Diagnosis: Left lateral epicondylitis     PRP PROCEDURE:  The patient was prepped and approximately 60 cc of whole blood was withdrawn using a standard sterile technique via antecubiatal access of the arm. The whole blood was processed on location and approximately 7 cc of Platelet Rich Plasma was obtained.      Technique: The risks of the procedure were explained to the patient.  A consent was signed for the PRP injection.  The patient was evaluated with a [employer] ultrasound machine using a 12 MHz linear probe.       The left Elbow  was prepped and draped in a sterile manner.  Ultrasound identification of the lateral epicondlytis in both long and short axis.  Area blood vessels noted.  The probe was placed in a oblique-sagittal axis to the left Elbow  joint.  A 2 inch 21 gauge needle was placed under ultrasound guidance into the left lateral extensor mass of the elbow.   A solution with total of volume of 7 cc PRP was injected into the left lateral extensor mass of the elbow in both long and short axis taking care to distribute the volume throughout all areas.  There was ultrasound documentation of needle placement and injection.        Impression:  Successful left intra-articular PRP lateral epicondylitis injection.       Plan:  Follow up prn   Will provide update in 1-2 and 4 weeks  Discussed potential next steps based on clinical progress  Advised will likely be sore over next 2-14 days, OTC and supportive care reviewed  All questions were answered today  Contact us with additional questions or concerns  Signs and sx of concern reviewed      MD GRETEL Forrest MD  Primary Care Sports Medicine  [facility]         Again, thank you for allowing me to participate in the care of your patient.        Sincerely,        Panchito Miranda MD

## 2022-04-05 NOTE — PATIENT INSTRUCTIONS
PRP (Platelet Rich Plasma) Post-Procedure Instructions  1. Do not take anti-inflammatories (Motrin, Advil, Naprosyn, Naproxen, Aleve, Indocin, Mobic, Toradol, Voltaren, Arthrotec, Ibuprofen, Diclofenac) for 2 weeks after the procedure  2. You can take Tylenol up to 1000 mg / dose and no more than 3,000 mg / day   3. You will be given a prescription strength pain medication (Norco) to be used for severe pain.  No driving or alcohol while taking narcotics.  4. If pain is persistent after Tylenol and Norco you may apply ice to the affected area for 20 minutes. Limit icing within the first 2 weeks.  5. Plan to rest the remainder of the day after the procedure.  6. A follow-up appointment should be scheduled for 1 week after the procedure.  7. Formal physical therapy, if needed, will begin 1-2 weeks after the procedure.  8. It can take up to 6-8 weeks to determine your full response to the PRP injection    For elbow tendinopathy / tearing  1. You will be in a sling for 3 - 7 days. Gentle range of motion out of the sling is necessary / encouraged.  2. No lifting more than a glass of water for first 3 days, no more than a pot of coffee from 3- 7 days.    3. No strength training, hitting or tennis for at least 6-8 weeks    It was great seeing you again today!    Panchito Miranda

## 2022-04-05 NOTE — PROGRESS NOTES
eRfugio Davies  :  1965  DOS: 2022  MRN: [id number]    Sports Medicine Clinic Procedure      Ultrasound Guided left lateral epicondylitis PRP Injection      Refugio Davies has elected to receive a left Elbow  lateral epicondlyitis PRP injection to help with modulation of pain and to promote healing. Sonographic guidance will be used to ensure accurate placement of the medication into the lateral extensor mass.      Diagnosis: Left lateral epicondylitis     PRP PROCEDURE:  The patient was prepped and approximately 60 cc of whole blood was withdrawn using a standard sterile technique via antecubiatal access of the arm. The whole blood was processed on location and approximately 7 cc of Platelet Rich Plasma was obtained.      Technique: The risks of the procedure were explained to the patient.  A consent was signed for the PRP injection.  The patient was evaluated with a [employer] ultrasound machine using a 12 MHz linear probe.       The left Elbow  was prepped and draped in a sterile manner.  Ultrasound identification of the lateral epicondlytis in both long and short axis.  Area blood vessels noted.  The probe was placed in a oblique-sagittal axis to the left Elbow  joint.  A 2 inch 21 gauge needle was placed under ultrasound guidance into the left lateral extensor mass of the elbow.   A solution with total of volume of 7 cc PRP was injected into the left lateral extensor mass of the elbow in both long and short axis taking care to distribute the volume throughout all areas.  There was ultrasound documentation of needle placement and injection.        Impression:  Successful left intra-articular PRP lateral epicondylitis injection.      Plan:  Follow up prn   Will provide update in 1-2 and 4 weeks  Discussed potential next steps based on clinical progress  Advised will likely be sore over next 2-14 days, OTC and supportive care reviewed  All questions were answered today  Contact us with additional  questions or concerns  Signs and sx of concern reviewed      Panchito Miranda MD CAQ, MD  Primary Care Sports Medicine  [facility]

## 2022-04-12 NOTE — PROGRESS NOTES
ASSESSMENT & PLAN    # Left Lateral Epicondylitis: Longstanding condition with patient with last PRP injection on 4/5/2022.  He feels his pain is a little bit better since prior to the procedure.  He still has tenderness to palpation over the lateral epicondyle with some pain with resisted wrist extension.  He also has some pain with end flexion as well.  Given this I would have him start occupational therapy and follow-up with Dr. Valenzuela as scheduled.  He can follow-up with me only as needed if symptoms flare  Image Findings: none today  Treatment: Activities as tolerated, continue home exercises, can restart occupational therapy  Job: Per Dr. Valenzuela's note  Medications/Injections: Limited tylenol/ibuprofen for pain for 1-2 weeks, none   Follow-up: As needed    -----    SUBJECTIVE:  Refugio Davies is a 56 year old male who is seen in follow-up for left elbow lateral epicondylitis PRP injection.They were last seen 4/5/2022 for the PRP injection. Cannot quite reach to his shoulder when in shoulder abduction and elbow flexed still. Pain is located medially and laterally to olecranon and over the lateral elbow. The patient is seen with a QRC (Qualified Rehabilitation Consultant).    Since their last visit reports 3-5 days of intense pain which has started to subside.  They indicate that their current pain level is 2/10.     Patient's past medical, surgical, social, and family histories were reviewed today and no changes are noted.    REVIEW OF SYSTEMS:  Constitutional: NEGATIVE for fever, chills, change in weight  Skin: NEGATIVE for worrisome rashes, moles or lesions  GI/: NEGATIVE for bowel or bladder changes  Neuro: NEGATIVE for weakness, dizziness or paresthesias    OBJECTIVE:  There were no vitals taken for this visit.   General: healthy, alert and in no distress  HEENT: no scleral icterus or conjunctival erythema  Skin: no suspicious lesions or rash. No jaundice.  CV: regular rhythm by palpation, no pedal  edema  Resp: normal respiratory effort without conversational dyspnea   Psych: normal mood and affect  Gait: normal steady gait with appropriate coordination and balance  Neuro: normal light touch sensory exam of the extremities.    MSK:    LEFT ELBOW  Inspection:    No swelling, bruising, discoloration, or obvious deformity or asymmetry  Palpation:   Mild TTP over left lateral epicondyle. Remainder of bony, ligamentous and tendinous landmarks are nontender.    Crepitus is Absent  Range of Motion:     Extension full / flexion full / pronation full / supination full  Strength:    Flexion full extension full pronation full supination limited slightly by pain  Special Tests:    Positive: Pain with resisted wrist extension, pain with resisted middle finger extension, pain with resisted supination    Negative: pain with resisted wrist flexion, pain with resisted pronation, milking manuever, varus stress, cubital tunnel (ulnar Tinel's)      Independent visualization of the below image:        Panchito Miranda MD, CAM  [facility]

## 2022-04-19 ENCOUNTER — OFFICE VISIT (OUTPATIENT)
Dept: ORTHOPEDICS | Facility: CLINIC | Age: 57
End: 2022-04-19
Payer: OTHER MISCELLANEOUS

## 2022-04-19 VITALS — SYSTOLIC BLOOD PRESSURE: 156 MMHG | WEIGHT: 188.8 LBS | BODY MASS INDEX: 24.91 KG/M2 | DIASTOLIC BLOOD PRESSURE: 96 MMHG

## 2022-04-19 DIAGNOSIS — M77.12 LATERAL EPICONDYLITIS OF LEFT ELBOW: Primary | ICD-10-CM

## 2022-04-19 PROCEDURE — 99207 PR NO CHARGE LOS: CPT | Performed by: FAMILY MEDICINE

## 2022-04-19 ASSESSMENT — PAIN SCALES - GENERAL: PAINLEVEL: MILD PAIN (2)

## 2022-04-19 NOTE — PATIENT INSTRUCTIONS
# Left Lateral Epicondylitis: Longstanding condition with patient with last PRP injection on 4/5/2022.  He feels his pain is a little bit better since prior to the procedure.  He still has tenderness to palpation over the lateral epicondyle with some pain with resisted wrist extension.  He also has some pain with end flexion as well.  Given this I would have him start occupational therapy and follow-up with Dr. Valenzuela as scheduled.  He can follow-up with me only as needed if symptoms flare  Image Findings: none today  Treatment: Activities as tolerated, continue home exercises, can restart occupational therapy  Job: Per Dr. Valenzuela's note  Medications/Injections: Limited tylenol/ibuprofen for pain for 1-2 weeks, none   Follow-up: As needed    Please call [phone]   Ask for my team if you have any questions or concerns    If you have not yet received the influenza vaccine but would like to get one, please call  [phone] or you can schedule via [employer]    It was great seeing you today!    Panchito Miranda MD, CAQSM

## 2022-04-19 NOTE — LETTER
4/19/2022         RE: Refugio Davies  [address] [address] [address]        Dear Colleague,    Thank you for referring your patient, Refugio Davies, to the [facility] [place]. Please see a copy of my visit note below.    ASSESSMENT & PLAN    # Left Lateral Epicondylitis: Longstanding condition with patient with last PRP injection on 4/5/2022.  He feels his pain is a little bit better since prior to the procedure.  He still has tenderness to palpation over the lateral epicondyle with some pain with resisted wrist extension.  He also has some pain with end flexion as well.  Given this I would have him start occupational therapy and follow-up with Dr. Valenzuela as scheduled.  He can follow-up with me only as needed if symptoms flare  Image Findings: none today  Treatment: Activities as tolerated, continue home exercises, can restart occupational therapy  Job: Per Dr. Valenzuela's note  Medications/Injections: Limited tylenol/ibuprofen for pain for 1-2 weeks, none   Follow-up: As needed    -----    SUBJECTIVE:  Refugio Davies is a 56 year old male who is seen in follow-up for left elbow lateral epicondylitis PRP injection.They were last seen 4/5/2022 for the PRP injection. Cannot quite reach to his shoulder when in shoulder abduction and elbow flexed still. Pain is located medially and laterally to olecranon and over the lateral elbow. The patient is seen with a QRC (Qualified Rehabilitation Consultant).    Since their last visit reports 3-5 days of intense pain which has started to subside.  They indicate that their current pain level is 2/10.     Patient's past medical, surgical, social, and family histories were reviewed today and no changes are noted.    REVIEW OF SYSTEMS:  Constitutional: NEGATIVE for fever, chills, change in weight  Skin: NEGATIVE for worrisome rashes, moles or lesions  GI/: NEGATIVE for bowel or bladder changes  Neuro: NEGATIVE for weakness, dizziness or  paresthesias    OBJECTIVE:  There were no vitals taken for this visit.   General: healthy, alert and in no distress  HEENT: no scleral icterus or conjunctival erythema  Skin: no suspicious lesions or rash. No jaundice.  CV: regular rhythm by palpation, no pedal edema  Resp: normal respiratory effort without conversational dyspnea   Psych: normal mood and affect  Gait: normal steady gait with appropriate coordination and balance  Neuro: normal light touch sensory exam of the extremities.    MSK:    LEFT ELBOW  Inspection:    No swelling, bruising, discoloration, or obvious deformity or asymmetry  Palpation:   Mild TTP over left lateral epicondyle. Remainder of bony, ligamentous and tendinous landmarks are nontender.    Crepitus is Absent  Range of Motion:     Extension full / flexion full / pronation full / supination full  Strength:    Flexion full extension full pronation full supination limited slightly by pain  Special Tests:    Positive: Pain with resisted wrist extension, pain with resisted middle finger extension, pain with resisted supination    Negative: pain with resisted wrist flexion, pain with resisted pronation, milking manuever, varus stress, cubital tunnel (ulnar Tinel's)      Independent visualization of the below image:        Panchito Miranda MD, [facility]        Again, thank you for allowing me to participate in the care of your patient.        Sincerely,        Panchito Miranda MD

## 2022-04-21 ENCOUNTER — THERAPY VISIT (OUTPATIENT)
Dept: OCCUPATIONAL THERAPY | Facility: CLINIC | Age: 57
End: 2022-04-21
Payer: OTHER MISCELLANEOUS

## 2022-04-21 DIAGNOSIS — M25.522 ELBOW PAIN, LEFT: Primary | ICD-10-CM

## 2022-04-21 PROCEDURE — 97140 MANUAL THERAPY 1/> REGIONS: CPT | Mod: GO | Performed by: OCCUPATIONAL THERAPIST

## 2022-04-21 PROCEDURE — 97110 THERAPEUTIC EXERCISES: CPT | Mod: GO | Performed by: OCCUPATIONAL THERAPIST

## 2022-04-21 NOTE — PROGRESS NOTES
SOAP note objective information for 4/21/2022:    Diagnosis: Left Elbow Pain   DOI: 1/6/2022  Therapy referral: 2/24/2022    Pain Level (Scale 0-10)   2/24/2022 3/31/2022 4/21/2022   At Rest 2/10 0/10    With Use 6/10 2-5/10 2-3/10     Strength   (Measured in pounds)  Pain Report: - none  + mild    ++ moderate    +++ severe    2/24/2022 2/24/2022 3/31/2022 3/31/2022 4/21/2022   Trials   R L L   1 Pain free 20 Max  55  100 38+ 52+     Palpation  Pain Report:  - none    + mild    ++ moderate    +++ severe    2/24/2022 3/31/2022 4/21/2022   Pec Major - - -   Proximal Triceps  - -   Spiral Groove + - -   Distal Triceps - - -   Anconeus + - -   ECRB at LEP - + + slight   ECU at LEP - - -   EDC at LEP - + -   Radial Head - - -   Extensor Wad - - -   PIN Site ++ + slight + slight     Special Test   4/21/2022   Tinels at cubital tunnel +     Home Program:   Warmth for stiffness to forearm extensors  Ice to LEP after activity for pain  TFM to LEP  Self MFR to forearm extensors with tennis ball, avoid PIN site  PROM with stretch to wrist extensors and flexors  Radial nerve glide   Eccentric wrist extension strengthening   3 position  strengthening, avoid clicking in wrist; begin with arm at side  OTC wrist splint sleeping  Avoid reaching activities that exacerbate pain in the elbow  Avoid leaning on elbow and prolonged elbow flexion to decrease ulnar nerve irritation    Next Visit:    See in 2 weeks  Assess response to 3 position gripping  Reassess ulnar nerve symptoms   Pt sees Dr. Valenzuela regarding RTW 5/5/2022    Please refer to the daily flowsheet for treatment today, total treatment time and time spent performing 1:1 timed codes.

## 2022-05-02 NOTE — PROGRESS NOTES
"Chief Complaint:   Chief Complaint   Patient presents with     Left Forearm - Pain     Work comp. Lateral epicondylitis. Had PRP injection with Dr. Miranda on 4/5/22. He feels like he is getting better. Has been trying to rest, but it does feel alright with tasks around the house.      DATE of INJURY: 1/6/2022      HPI: Refugio Davies is a 56 year old male , right -hand dominant, who presents for followup evaluation and management of a left elbow and forearm injury, now about 4 months out. Returns today stating he's getting better, still sore daily, at most 2/10. Pain is not constant, really only with reaching/liftingbut overall improving. Going to hand Therapy (for elbow) and physical therapy (for neck). No pain at rest. Using the arm every day at home, household things, seems ok.    Has felt a \"snapping\" in the forearm with squeezing a sponge with hand therapy. Doesn't hurt, more annoying than anything.    He had a PRP injection 4/5/2022 with Dr Miranda. Seems to have helped some.    INJURY:  1/6/2022 while at work, pulling a garbage can out of the snow and felt a pop and pain down the forearm to the hand. Worked through the day with the pain. Presented to the ED that night with xrays of the forearm and wrist negative.          He reports having mild pain/discomfort around the injury site.   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   Patient Active Problem List   Diagnosis     CARDIOVASCULAR SCREENING; LDL GOAL LESS THAN 160     Tobacco use disorder     Other idiopathic scoliosis, lumbar region     History of pneumothorax     Neck pain     Acute pain of left shoulder     Elbow " "pain, left     Pain of left forearm       Past surgical history:  has a past surgical history that includes Open reduction internal fixation rodding intramedullary femur (Right, 1997) and Lung surgery (Right, 2009).     Medications: No current outpatient medications on file.    Allergies:   No Known Allergies     Family History: family history includes Breast Cancer in his mother; Diabetes in his maternal aunt; Thyroid Disease in his brother.     Social History:  reports that he has been smoking cigarettes. He has been smoking about 0.50 packs per day. He has never used smokeless tobacco. He reports current alcohol use. He reports that he does not use drugs.    Review of Systems:     Denies numbness, tingling, parasthesias.   Denies headaches.   Denies fevers, chills, night sweats   Denies chest pain.   Denies shortness of breath.   Denies any skin problems, abrasions, rashes, irritation.      Physical Exam  GENERAL APPEARANCE: healthy, alert, no distress.   SKIN: no suspicious lesions or rashes  RESPIRATORY: No increased work of breathing.  NEURO: Normal strength and tone, mentation intact and speech normal  PSYCH:  mentation appears normal and affect normal. Not anxious.  Hands: no clubbing, nail pitting or nodes.    BP (!) 162/84   Ht 1.854 m (6' 1\")   Wt 85.3 kg (188 lb)   BMI 24.80 kg/m         ELBOW:  Skin intact. No open wounds. No skin maceration.  Inspection: no swelling, no ecchymosis, no olecranon bursa swelling, no distal bicep tendon defect  Tender: lateral epicondyle, common extensor tendon  Non-tender: medial epicondyle, common flexor tendon, flexor muscle of forearm, supracondylar notch, olecranon bursa and distal bicep tendon  Range of Motion: all normal, antecubital discomfort with full flexion.  Strength: elbow strength full  There is palpable crepitus over the flexor tendons with gripping, difficult to determine exact origin, but almost feels over the volar wrist/carpus.  Special tests: normal " stability, pain with resisted wrist extension, slight pain with resisted middle finger extension, slight  pain with resisted wrist flexion      X-rays: no new images today.  3 views left wrist, 2 views left forearm, [facility], from 1/6/2022 -- No obvious fractures or dislocations..    MRI left elbow 1/19/2022:  On the background of tendinosis, moderate-grade intrasubstance tear of the common extensor tendon at its proximal attachment.    Assessment: 56 year old male , right -hand dominant, with acute left elbow pain, strain following work injury, underlying lateral epicondylitis - improving.        Plan: nonsurgical..  * glad to hear improving.  * hopefully will get more improvement with time following PRP injection, will monitor  * unclear of snapping in the forearm/wrist. Likely flexor tendons, perhaps tendinitis. Will monitor as long as not associated with pain.  * recommend lifting with palm up, supination, as this will take some stress off the extensors.  * continue hand therapy for the elbow, forearm.    * counterforce elbow brace/strap, taping as needed.  * wrist brace to prevent wrist extension as needed.  * elbow massage and icing  * shoulder range of motion, gentle  * gentle neck range of motion.    * NDAIDS as needed.    * cortisone injection discussed, place at point of maximal tenderness, discussed and consider in future as needed     * return to clinic 4 weeks, clinical recheck.  * consider acupuncture in future as needed, patient inquired about it today, we surely can consider it if needed.  * workability note. Light duties, 5lb lifting with left upper extremity.        Jose Daniel Valenzuela M.D., M.S.  Dept. of Orthopaedic Surgery  [facility]

## 2022-05-04 ENCOUNTER — OFFICE VISIT (OUTPATIENT)
Dept: ORTHOPEDICS | Facility: CLINIC | Age: 57
End: 2022-05-04
Payer: OTHER MISCELLANEOUS

## 2022-05-04 VITALS
BODY MASS INDEX: 24.92 KG/M2 | SYSTOLIC BLOOD PRESSURE: 162 MMHG | HEIGHT: 73 IN | WEIGHT: 188 LBS | DIASTOLIC BLOOD PRESSURE: 84 MMHG

## 2022-05-04 DIAGNOSIS — M77.12 LATERAL EPICONDYLITIS, LEFT ELBOW: Primary | ICD-10-CM

## 2022-05-04 DIAGNOSIS — S59.902D ELBOW INJURY, LEFT, SUBSEQUENT ENCOUNTER: ICD-10-CM

## 2022-05-04 PROCEDURE — 99213 OFFICE O/P EST LOW 20 MIN: CPT | Performed by: ORTHOPAEDIC SURGERY

## 2022-05-04 ASSESSMENT — PAIN SCALES - GENERAL: PAINLEVEL: MILD PAIN (2)

## 2022-05-04 NOTE — LETTER
"    5/4/2022         RE: Refugio Davies  [address] [address] [place] [address]        Dear Colleague,    Thank you for referring your patient, Refugio Davies, to the [facility] [place]. Please see a copy of my visit note below.    Chief Complaint:   Chief Complaint   Patient presents with     Left Forearm - Pain     Work comp. Lateral epicondylitis. Had PRP injection with Dr. Miranda on 4/5/22. He feels like he is getting better. Has been trying to rest, but it does feel alright with tasks around the house.      DATE of INJURY: 1/6/2022      HPI: Refugio Davies is a 56 year old male , right -hand dominant, who presents for followup evaluation and management of a left elbow and forearm injury, now about 4 months out. Returns today stating he's getting better, still sore daily, at most 2/10. Pain is not constant, really only with reaching/liftingbut overall improving. Going to hand Therapy (for elbow) and physical therapy (for neck). No pain at rest. Using the arm every day at home, household things, seems ok.    Has felt a \"snapping\" in the forearm with squeezing a sponge with hand therapy. Doesn't hurt, more annoying than anything.    He had a PRP injection 4/5/2022 with Dr Miranda. Seems to have helped some.    INJURY:  1/6/2022 while at work, pulling a garbage can out of the snow and felt a pop and pain down the forearm to the hand. Worked through the day with the pain. Presented to the ED that night with xrays of the forearm and wrist negative.          He reports having mild pain/discomfort around the injury site.   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   Patient Active Problem List   Diagnosis     CARDIOVASCULAR SCREENING; LDL GOAL LESS THAN 160     Tobacco use disorder     Other idiopathic scoliosis, lumbar region     History of pneumothorax     Neck pain     Acute pain of left shoulder     Elbow pain, left     Pain of left forearm       Past surgical history:  has a past surgical history that includes Open reduction internal fixation rodding intramedullary femur (Right, 1997) and Lung surgery (Right, 2009).     Medications: No current outpatient medications on file.    Allergies:   No Known Allergies     Family History: family history includes Breast Cancer in his mother; Diabetes in his maternal aunt; Thyroid Disease in his brother.     Social History:  reports that he has been smoking cigarettes. He has been smoking about 0.50 packs per day. He has never used smokeless tobacco. He reports current alcohol use. He reports that he does not use drugs.    Review of Systems:     Denies numbness, tingling, parasthesias.   Denies headaches.   Denies fevers, chills, night sweats   Denies chest pain.   Denies shortness of breath.   Denies any skin problems, abrasions, rashes, irritation.      Physical Exam  GENERAL APPEARANCE: healthy, alert, no distress.   SKIN: no suspicious lesions or rashes  RESPIRATORY: No increased work of breathing.  NEURO: Normal strength and tone, mentation intact and speech normal  PSYCH:  mentation appears normal and affect normal. Not anxious.  Hands: no clubbing, nail pitting or nodes.    BP (!) 162/84   Ht 1.854 m (6' 1\")   Wt 85.3 kg (188 lb)   BMI 24.80 kg/m         ELBOW:  Skin intact. No open wounds. No skin maceration.  Inspection: no swelling, no ecchymosis, no olecranon bursa swelling, no distal bicep tendon defect  Tender: lateral epicondyle, common extensor tendon  Non-tender: medial epicondyle, common flexor tendon, flexor muscle of forearm, supracondylar notch, olecranon bursa and distal bicep tendon  Range of " Motion: all normal, antecubital discomfort with full flexion.  Strength: elbow strength full  There is palpable crepitus over the flexor tendons with gripping, difficult to determine exact origin, but almost feels over the volar wrist/carpus.  Special tests: normal stability, pain with resisted wrist extension, slight pain with resisted middle finger extension, slight  pain with resisted wrist flexion      X-rays: no new images today.  3 views left wrist, 2 views left forearm, [facility], from 1/6/2022 -- No obvious fractures or dislocations..    MRI left elbow 1/19/2022:  On the background of tendinosis, moderate-grade intrasubstance tear of the common extensor tendon at its proximal attachment.    Assessment: 56 year old male , right -hand dominant, with acute left elbow pain, strain following work injury, underlying lateral epicondylitis - improving.        Plan: nonsurgical..  * glad to hear improving.  * hopefully will get more improvement with time following PRP injection, will monitor  * unclear of snapping in the forearm/wrist. Likely flexor tendons, perhaps tendinitis. Will monitor as long as not associated with pain.  * recommend lifting with palm up, supination, as this will take some stress off the extensors.  * continue hand therapy for the elbow, forearm.    * counterforce elbow brace/strap, taping as needed.  * wrist brace to prevent wrist extension as needed.  * elbow massage and icing  * shoulder range of motion, gentle  * gentle neck range of motion.    * NDAIDS as needed.    * cortisone injection discussed, place at point of maximal tenderness, discussed and consider in future as needed     * return to clinic 4 weeks, clinical recheck.  * consider acupuncture in future as needed, patient inquired about it today, we surely can consider it if needed.  * workability note. Light duties, 5lb lifting with left upper extremity.        Jose Daniel Valenzuela M.D., M.S.  Dept. of Orthopaedic Surgery  [facility]      Again, thank you for allowing me to participate in the care of your patient.        Sincerely,        Jose Daniel Valenzuela MD

## 2022-05-04 NOTE — LETTER
May 4, 2022      Refugio Davies  [address]  [place] [address]        To Whom It May Concern,     Refugio Davies was seen in clinic for follow-up of a work comp injury, on May 4, 2022. He can return to work with restrictions: 5 lb restriction for lifting, carrying, pushing and pulling with the left upper extremity.   Follow-up in 4 weeks    If you have questions or concerns, please call the clinic at the number listed above.    Sincerely,         Philipp Odell PA-C, CAQ-OS  Dept. of Orthopedic Surgery  [facility]

## 2022-05-09 ENCOUNTER — THERAPY VISIT (OUTPATIENT)
Dept: OCCUPATIONAL THERAPY | Facility: CLINIC | Age: 57
End: 2022-05-09
Payer: OTHER MISCELLANEOUS

## 2022-05-09 DIAGNOSIS — M25.522 ELBOW PAIN, LEFT: Primary | ICD-10-CM

## 2022-05-09 PROCEDURE — 97535 SELF CARE MNGMENT TRAINING: CPT | Mod: GO | Performed by: OCCUPATIONAL THERAPIST

## 2022-05-09 PROCEDURE — 97140 MANUAL THERAPY 1/> REGIONS: CPT | Mod: GO | Performed by: OCCUPATIONAL THERAPIST

## 2022-05-09 PROCEDURE — 97110 THERAPEUTIC EXERCISES: CPT | Mod: GO | Performed by: OCCUPATIONAL THERAPIST

## 2022-05-09 NOTE — PROGRESS NOTES
SOAP note objective information for 5/9/2022:    Diagnosis: Left Elbow Pain   DOI: 1/6/2022  Therapy referral: 2/24/2022    Pain Level (Scale 0-10)   2/24/2022 3/31/2022 4/21/2022 5/9/2022   At Rest 2/10 0/10     With Use 6/10 2-5/10 2-3/10 2/10     Strength   (Measured in pounds)  Pain Report: - none  + mild    ++ moderate    +++ severe    2/24/2022 2/24/2022 3/31/2022 3/31/2022 4/21/2022 5/9/2022   Trials   R L L L   1 Pain free 20 Max  55  100 38+ 52+ 55+     Palpation  Pain Report:  - none    + mild    ++ moderate    +++ severe    2/24/2022 3/31/2022 4/21/2022 5/9/2022   Pec Major - - - -   Proximal Triceps  - - -   Spiral Groove + - - -   Distal Triceps - - - -   Anconeus + - - -   ECRB at LEP - + + slight + slight   ECU at LEP - - - -   EDC at LEP - + - -   Radial Head - - - -   Extensor Wad - - - -   PIN Site ++ + slight + slight -     Special Test   4/21/2022 5/9/2022   Tinels at cubital tunnel + +     Home Program:   Warmth for stiffness to forearm extensors  Ice to LEP after activity for pain  TFM to LEP  Self MFR to forearm extensors with tennis ball, avoid PIN site  PROM with stretch to wrist extensors and flexors  Radial nerve glide   Eccentric wrist extension strengthening   3 position  strengthening, avoid clicking in wrist; begin with arm at side  Soft elbow flexion block sleeve sleeping  Avoid reaching activities that exacerbate pain in the elbow  Avoid leaning on elbow and prolonged elbow flexion to decrease ulnar nerve irritation    Next Visit:    See in 1-2 weeks  Assess response to soft elbow flexion block  Progress Report to determine POC  MD f/u 6/2/2022    Please refer to the daily flowsheet for treatment today, total treatment time and time spent performing 1:1 timed codes.

## 2022-05-16 ENCOUNTER — THERAPY VISIT (OUTPATIENT)
Dept: OCCUPATIONAL THERAPY | Facility: CLINIC | Age: 57
End: 2022-05-16
Payer: OTHER MISCELLANEOUS

## 2022-05-16 DIAGNOSIS — M25.522 ELBOW PAIN, LEFT: Primary | ICD-10-CM

## 2022-05-16 PROCEDURE — 97535 SELF CARE MNGMENT TRAINING: CPT | Mod: GO | Performed by: OCCUPATIONAL THERAPIST

## 2022-05-16 PROCEDURE — 97110 THERAPEUTIC EXERCISES: CPT | Mod: GO | Performed by: OCCUPATIONAL THERAPIST

## 2022-05-16 NOTE — PROGRESS NOTES
Hand Therapy Progress/Discharge Note    Current Date:  5/16/2022    Reporting period is 3/31/2022 to 5/16/2022    Diagnosis: Left Elbow Pain   DOI: 1/6/2022  Therapy referral: 2/24/2022    Subjective:   Subjective changes noted by patient:  I wear the elbow sleeve at night so the tingling is better, the elbow is a little stiff but not much pain.  Functional changes noted by patient:  Improvement in Household Chores  Patient has noted adverse reaction to:  None    Functional Outcome Measure:  Upper Extremity Functional Index  SCORE:   Column Totals: 78/80  (A lower score indicates greater disability.)    Objective:  Pain Level (Scale 0-10)   2/24/2022 3/31/2022 5/16/2022   At Rest 2/10 0/10 0/10   With Use 6/10 2-5/10 1-2/10     Pain Description  Date 2/24/2022 3/31/2022 5/16/2022   Location elbow and wrist Elbow and forearm Elbow and forearm   Pain Quality Sharp and Shooting Sharp stiffness   Frequency intermittent   intermittent   intermittent   Pain is worst  daytime daytime morning   Exacerbated by  reaching Holding in supination, lifting, reaching sleeping   Relieved by rest and brace/ tennis elbow strap  Rest, arm band stretching   Progression Gradually getting better Gradually improving  Improved     Posture  Forward Neck Posture and Rounded Forward Shoulders    Sensation  WNL throughout all nerve distributions; per patient report occasional numbness in ulnar nerve distribution has improved with wear of soft flexion block    ROM  Pain Report: - none  + mild    ++ moderate    +++ severe   Shoulder ,Elbow and Forearm WNL  Elbow 2/24/2022 3/31/2022 5/16/2022   AROM (PROM) R R R   Extension + + -   Flexion + - -   Supination + + slight + slight   Pronation - - -     Wrist 2/24/2022 3/31/2022 5/16/2022   AROM (PROM) R R R   Extension + - + slight   Flexion + + slight -   RD - - -   UD - - -     Resisted Testing  Pain Report:  - none    + mild    ++ moderate    +++ severe    2/24/2022 3/31/2022 5/16/2022   Elbow  Extension - - -   Elbow Flexion - - -   Supination  + + slight + slight   Pronation - - -   Wrist Ext with RD, Elbow Ext ++ ++ + slight   Wrist Ext with UD, Elbow Ext - + -   Wrist Flex with RD, Elbow Ext + - -   Wrist Flex with UD, Elbow Ext - - -   EDC with Elbow Ext ++ - -   Long Finger Test + - -     Strength   (Measured in pounds)  Pain Report: - none  + mild    ++ moderate    +++ severe    2/24/2022 2/24/2022 3/31/2022 3/31/2022 5/16/2022   Trials   R L L   1 Pain free 20 Max  55  100 38+ 67-     Special Tests   - none    + mild    ++ moderate    +++ severe       5/16/2022   Elbow Flexion Test -   Tinels Cubital Tunnel + slight   Tinels [place] -   Tinels at Carpal Tunnel -   [place] + slight ulnar n     Palpation  Pain Report:  - none    + mild    ++ moderate    +++ severe    2/24/2022 3/31/2022 5/16/2022   Pec Major - - -   Proximal Triceps  - -   Spiral Groove + - -   Distal Triceps - - -   Anconeus + - -   ECRB at LEP - + + slight   ECU at LEP - - -   EDC at LEP - + + slight   Radial Head - - -   Extensor Wad - - -   PIN Site ++ + slight -     Please refer to the daily flowsheet for treatment provided today.     Assessment:  Response to therapy has been improvement to:  Flexibility:  less tightness in involved muscles  Strength:     Pain:  intensity of pain is decreased    Overall Assessment:  Patient's symptoms are resolving and patient is independent in home exercise program  STG/LTG:  STGoals have been reviewed and progress or achievement has occurred;  see goal sheet for details and updates.  I have re-evaluated this patient and find that the nature, scope, duration and intensity of the therapy is appropriate for the medical condition of the patient.    Plan:  Frequency/Duration:  Discharge from Hand Therapy; continue home program.    Recommendations for Continued Therapy  Home Program:   Warmth for stiffness to forearm extensors  Ice to LEP after activity for pain  TFM to LEP  Self  MFR to forearm extensors with tennis ball, avoid PIN site  PROM with stretch to wrist extensors and flexors  Radial nerve glide   Eccentric wrist extension strengthening   3 position  strengthening, avoid clicking in wrist  Soft elbow flexion block sleeve sleeping  Avoid reaching activities that exacerbate pain in the elbow  Avoid leaning on elbow and prolonged elbow flexion to decrease ulnar nerve irritation  MD f/u 6/2/2022

## 2022-05-25 NOTE — PROGRESS NOTES
"Chief Complaint:   Chief Complaint   Patient presents with     Left Forearm - Pain     W/C. Forearm/elbow pain. DOI 1/6/22, 5 month s/p. Patient notes his forearm/elbow is doing good. He still has the popping in his lower arm. He is doing his ADL without much pain. When he does his exercises it will get a bit sore. He has completed physical therapy.      DATE of INJURY: 1/6/2022      HPI: Refugio Davies is a 56 year old male , right -hand dominant, who presents for followup evaluation and management of a left elbow and forearm injury, now about 5 months out. Returns today stating he's getting better, very mild discomfort 1/10. Pain is not constant, really only with reaching/liftingbut overall improving. Going to hand Therapy (for elbow) and physical therapy (for neck). No pain at rest. Using the arm every day at home, household things, seems ok. He feels ready to return to work.    Has felt a \"snapping\" in the forearm with squeezing a sponge with hand therapy. Doesn't hurt, more annoying than anything.    He had a PRP injection 4/5/2022 with Dr Miranda. Seems to have helped some.    INJURY:  1/6/2022 while at work, pulling a garbage can out of the snow and felt a pop and pain down the forearm to the hand. Worked through the day with the pain. Presented to the ED that night with xrays of the forearm and wrist negative.          He reports having mild pain/discomfort around the injury site.   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   Patient Active Problem List   Diagnosis     CARDIOVASCULAR SCREENING; LDL GOAL LESS THAN 160     Tobacco use disorder     Other idiopathic scoliosis, " "lumbar region     History of pneumothorax     Neck pain     Acute pain of left shoulder     Elbow pain, left     Pain of left forearm       Past surgical history:  has a past surgical history that includes Open reduction internal fixation rodding intramedullary femur (Right, 1997) and Lung surgery (Right, 2009).     Medications: No current outpatient medications on file.    Allergies:   No Known Allergies     Family History: family history includes Breast Cancer in his mother; Diabetes in his maternal aunt; Thyroid Disease in his brother.     Social History:  reports that he has been smoking cigarettes. He has been smoking about 0.50 packs per day. He has never used smokeless tobacco. He reports current alcohol use. He reports that he does not use drugs.    Review of Systems:     Denies numbness, tingling, parasthesias.   Denies headaches.   Denies fevers, chills, night sweats   Denies chest pain.   Denies shortness of breath.   Denies any skin problems, abrasions, rashes, irritation.      Physical Exam  GENERAL APPEARANCE: healthy, alert, no distress.   SKIN: no suspicious lesions or rashes  RESPIRATORY: No increased work of breathing.  NEURO: Normal strength and tone, mentation intact and speech normal  PSYCH:  mentation appears normal and affect normal. Not anxious.  Hands: no clubbing, nail pitting or nodes.    /85   Pulse 102   Ht 1.854 m (6' 1\")   Wt 87.1 kg (192 lb)   BMI 25.33 kg/m         ELBOW:  Skin intact. No open wounds. No skin maceration.  Inspection: no swelling, no ecchymosis, no olecranon bursa swelling, no distal bicep tendon defect  Tender: minimal at lateral epicondyle, common extensor tendon  Non-tender: medial epicondyle, common flexor tendon, flexor muscle of forearm, supracondylar notch, olecranon bursa and distal bicep tendon  Range of Motion: all normal, antecubital discomfort with full flexion.  Strength: elbow strength full  There is palpable crepitus over the flexor tendon of " "the left index finger at the A1 pulley with firm gripping, but not with normal fist. No obvious locking or catching noted.   A1 pulley is nontender to palpation.  Special tests: normal stability, minimal if any pain with resisted wrist extension, no pain with resisted middle finger extension, no pain with resisted wrist flexion      X-rays: no new images today.  3 views left wrist, 2 views left forearm, [facility], from 1/6/2022 -- No obvious fractures or dislocations..    MRI left elbow 1/19/2022:  On the background of tendinosis, moderate-grade intrasubstance tear of the common extensor tendon at its proximal attachment.    Assessment: 56 year old male , right -hand dominant, with acute left elbow pain, strain following work injury, underlying lateral epicondylitis - improving.        Plan: nonsurgical..  * glad to hear improving.  * suspect the \"popping\" is the left index finger, pre-trigger. Not grossly triggering/locking but snapping with firm grasping. Will monitor.  * recommend lifting with palm up, supination, as this will take some stress off the extensors.  * continue hand therapy for the elbow, forearm.    * counterforce elbow brace/strap, taping as needed.  * wrist brace to prevent wrist extension as needed.  * elbow massage and icing  * shoulder range of motion, gentle  * gentle neck range of motion.    * NDAIDS as needed.    * return to clinic 4 weeks, clinical recheck.  * consider acupuncture in future as needed, patient inquired about it today, we surely can consider it if needed.  * workability note. Return to work without restrictions 6/6/2022        Jose Daniel Valenzuela M.D., M.S.  Dept. of Orthopaedic Surgery  [facility]  "

## 2022-06-02 ENCOUNTER — OFFICE VISIT (OUTPATIENT)
Dept: ORTHOPEDICS | Facility: CLINIC | Age: 57
End: 2022-06-02
Payer: OTHER MISCELLANEOUS

## 2022-06-02 VITALS
BODY MASS INDEX: 25.45 KG/M2 | WEIGHT: 192 LBS | SYSTOLIC BLOOD PRESSURE: 137 MMHG | HEIGHT: 73 IN | HEART RATE: 102 BPM | DIASTOLIC BLOOD PRESSURE: 85 MMHG

## 2022-06-02 DIAGNOSIS — M77.12 LATERAL EPICONDYLITIS, LEFT ELBOW: Primary | ICD-10-CM

## 2022-06-02 DIAGNOSIS — S59.902D ELBOW INJURY, LEFT, SUBSEQUENT ENCOUNTER: ICD-10-CM

## 2022-06-02 PROCEDURE — 99213 OFFICE O/P EST LOW 20 MIN: CPT | Performed by: ORTHOPAEDIC SURGERY

## 2022-06-02 ASSESSMENT — PAIN SCALES - GENERAL: PAINLEVEL: NO PAIN (1)

## 2022-06-02 NOTE — LETTER
"    6/2/2022         RE: Refugio Davies  [id number]  [place] [address]        Dear Colleague,    Thank you for referring your patient, Refugio Davies, to the [facility]. Please see a copy of my visit note below.    Chief Complaint:   Chief Complaint   Patient presents with     Left Forearm - Pain     W/C. Forearm/elbow pain. DOI 1/6/22, 5 month s/p. Patient notes his forearm/elbow is doing good. He still has the popping in his lower arm. He is doing his ADL without much pain. When he does his exercises it will get a bit sore. He has completed physical therapy.      DATE of INJURY: 1/6/2022      HPI: Refugio Davies is a 56 year old male , right -hand dominant, who presents for followup evaluation and management of a left elbow and forearm injury, now about 5 months out. Returns today stating he's getting better, very mild discomfort 1/10. Pain is not constant, really only with reaching/liftingbut overall improving. Going to hand Therapy (for elbow) and physical therapy (for neck). No pain at rest. Using the arm every day at home, household things, seems ok. He feels ready to return to work.    Has felt a \"snapping\" in the forearm with squeezing a sponge with hand therapy. Doesn't hurt, more annoying than anything.    He had a PRP injection 4/5/2022 with Dr Miranda. Seems to have helped some.    INJURY:  1/6/2022 while at work, pulling a garbage can out of the snow and felt a pop and pain down the forearm to the hand. Worked through the day with the pain. Presented to the ED that night with xrays of the forearm and wrist negative.          He reports having mild pain/discomfort around the injury site.   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   Patient Active Problem List   Diagnosis     CARDIOVASCULAR SCREENING; LDL GOAL LESS THAN 160     Tobacco use disorder     Other idiopathic scoliosis, lumbar region     History of pneumothorax     Neck pain     Acute pain of left shoulder     Elbow pain, left     Pain of left forearm       Past surgical history:  has a past surgical history that includes Open reduction internal fixation rodding intramedullary femur (Right, 1997) and Lung surgery (Right, 2009).     Medications: No current outpatient medications on file.    Allergies:   No Known Allergies     Family History: family history includes Breast Cancer in his mother; Diabetes in his maternal aunt; Thyroid Disease in his brother.     Social History:  reports that he has been smoking cigarettes. He has been smoking about 0.50 packs per day. He has never used smokeless tobacco. He reports current alcohol use. He reports that he does not use drugs.    Review of Systems:     Denies numbness, tingling, parasthesias.   Denies headaches.   Denies fevers, chills, night sweats   Denies chest pain.   Denies shortness of breath.   Denies any skin problems, abrasions, rashes, irritation.      Physical Exam  GENERAL APPEARANCE: healthy, alert, no distress.   SKIN: no suspicious lesions or rashes  RESPIRATORY: No increased work of breathing.  NEURO: Normal strength and tone, mentation intact and speech normal  PSYCH:  mentation appears normal and affect normal. Not anxious.  Hands: no clubbing, nail pitting or nodes.    /85   Pulse 102   Ht 1.854 m (6' 1\")   Wt 87.1 kg (192 lb)   BMI 25.33 kg/m         ELBOW:  Skin intact. No open wounds. No skin maceration.  Inspection: no swelling, no ecchymosis, no olecranon bursa swelling, no distal bicep tendon defect  Tender: minimal at lateral epicondyle, common extensor tendon  Non-tender: medial epicondyle, common " "flexor tendon, flexor muscle of forearm, supracondylar notch, olecranon bursa and distal bicep tendon  Range of Motion: all normal, antecubital discomfort with full flexion.  Strength: elbow strength full  There is palpable crepitus over the flexor tendon of the left index finger at the A1 pulley with firm gripping, but not with normal fist. No obvious locking or catching noted.   A1 pulley is nontender to palpation.  Special tests: normal stability, minimal if any pain with resisted wrist extension, no pain with resisted middle finger extension, no pain with resisted wrist flexion      X-rays: no new images today.  3 views left wrist, 2 views left forearm, [facility], from 1/6/2022 -- No obvious fractures or dislocations..    MRI left elbow 1/19/2022:  On the background of tendinosis, moderate-grade intrasubstance tear of the common extensor tendon at its proximal attachment.    Assessment: 56 year old male , right -hand dominant, with acute left elbow pain, strain following work injury, underlying lateral epicondylitis - improving.        Plan: nonsurgical..  * glad to hear improving.  * suspect the \"popping\" is the left index finger, pre-trigger. Not grossly triggering/locking but snapping with firm grasping. Will monitor.  * recommend lifting with palm up, supination, as this will take some stress off the extensors.  * continue hand therapy for the elbow, forearm.    * counterforce elbow brace/strap, taping as needed.  * wrist brace to prevent wrist extension as needed.  * elbow massage and icing  * shoulder range of motion, gentle  * gentle neck range of motion.    * NDAIDS as needed.    * return to clinic 4 weeks, clinical recheck.  * consider acupuncture in future as needed, patient inquired about it today, we surely can consider it if needed.  * workability note. Return to work without restrictions 6/6/2022        Jose Daniel Valenzuela M.D., M.S.  Dept. of Orthopaedic Surgery  [facility]      Again, thank you for allowing me to participate in the care of your patient.        Sincerely,        Jose Daniel Valenzuela MD

## 2022-06-02 NOTE — LETTER
June 2, 2022      RE: Refugio Davies  [address]  [place] [address]        To whom it may concern:     Refugio Davies is under my care for left forearm/elbow injury.    Work related injury: Yes       Date of injury: 1/6/22          Return to work date: 6/6/22     ** WITH RESTRICTIONS? No restrictions        Next appointment: 4 weeks        Electronically Signed (as below)  Dr. Jose Daniel Valenzuela M.D.

## 2022-06-08 ENCOUNTER — OFFICE VISIT (OUTPATIENT)
Dept: ORTHOPEDICS | Facility: CLINIC | Age: 57
End: 2022-06-08
Payer: OTHER MISCELLANEOUS

## 2022-06-08 VITALS — WEIGHT: 192 LBS | HEIGHT: 73 IN | BODY MASS INDEX: 25.45 KG/M2

## 2022-06-08 DIAGNOSIS — M77.12 LATERAL EPICONDYLITIS, LEFT ELBOW: ICD-10-CM

## 2022-06-08 DIAGNOSIS — S59.902D ELBOW INJURY, LEFT, SUBSEQUENT ENCOUNTER: Primary | ICD-10-CM

## 2022-06-08 PROCEDURE — 99213 OFFICE O/P EST LOW 20 MIN: CPT | Performed by: ORTHOPAEDIC SURGERY

## 2022-06-08 ASSESSMENT — PAIN SCALES - GENERAL: PAINLEVEL: MODERATE PAIN (5)

## 2022-06-08 NOTE — LETTER
6/8/2022         RE: Refugio Davies  [address] [place] [address]        Dear Colleague,    Thank you for referring your patient, Refugio Davies, to the [facility]. Please see a copy of my visit note below.    Chief Complaint:   Chief Complaint   Patient presents with     Left Forearm - Pain     Work Comp. DOI: 1/6/22. He was doing well and went back to work on 6/6/22 with no restrictions. He made it through the day with NKI, but has been unable to return to work since do to flare up in pain.       DATE of INJURY: 1/6/2022      HPI: Refugio Davies is a 56 year old male , right -hand dominant, who presents for followup evaluation and management of a left elbow and forearm injury, now about 5 months out. We saw him last week and was doing better, wanting to return to work, which he did on 6/6/2022. Presents today stating he made it through the day, got worse as the day went on, but afterwards pain returned and unable to return to work due to flare up. Pain lateral elbow, down the forearm to the wrist, inner elbow as well. Since then resting, massage, ice.    At last visit, we had discussed a gradual return to work plan, limited hours per day. However, Refugio felt ready to go back without any restrictions. He states after our visit last week he spoke to his martinez about returning, and sounds like the plan was to return but limit himself. However, when he went to work this past Monday, that martinez was out on vacation so whomever was covering had him go back fully.      He had a PRP injection 4/5/2022 with Dr Miranda. Seemed to have helped some.    INJURY:  1/6/2022 while at work, pulling a garbage can out of the snow and felt a pop and pain down the forearm to the hand. Worked through the day with the pain. Presented to the ED that night with xrays of the forearm and wrist negative.          He reports having moderate  pain/discomfort around the injury site.   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   Patient Active Problem List   Diagnosis     CARDIOVASCULAR SCREENING; LDL GOAL LESS THAN 160     Tobacco use disorder     Other idiopathic scoliosis, lumbar region     History of pneumothorax     Neck pain     Acute pain of left shoulder     Elbow pain, left     Pain of left forearm       Past surgical history:  has a past surgical history that includes Open reduction internal fixation rodding intramedullary femur (Right, 1997) and Lung surgery (Right, 2009).     Medications: No current outpatient medications on file.    Allergies:   No Known Allergies     Family History: family history includes Breast Cancer in his mother; Diabetes in his maternal aunt; Thyroid Disease in his brother.     Social History:  reports that he has been smoking cigarettes. He has been smoking about 0.50 packs per day. He has never used smokeless tobacco. He reports current alcohol use. He reports that he does not use drugs.    Review of Systems:     Denies numbness, tingling, parasthesias.   Denies headaches.   Denies fevers, chills, night sweats   Denies chest pain.   Denies shortness of breath.   Denies any skin problems, abrasions, rashes, irritation.      Physical Exam  GENERAL APPEARANCE: healthy, alert, no distress. Accompanied by his QRC  SKIN: no suspicious lesions or rashes  RESPIRATORY: No increased work of breathing.  NEURO: Normal strength and tone, mentation intact and speech normal  PSYCH:  mentation appears normal and affect normal. Not anxious.    Ht 1.854 m (6' 1\")   Wt 87.1 kg (192 lb)   BMI 25.33 kg/m         LEFT ELBOW:  Skin intact. No open wounds. No " skin maceration.  Inspection: no swelling, no ecchymosis, no olecranon bursa swelling, no distal bicep tendon defect  Tender: mild to moderate tender to palpation at lateral epicondyle, common extensor tendon    Mild tender to palpation medial epicondyle, flexor pronator mass.  Non-tender: supracondylar notch, olecranon bursa and distal bicep tendon  Range of Motion: all normal, antecubital discomfort with full flexion.  Strength: elbow strength full  There is palpable crepitus over the flexor tendon of the left index finger at the A1 pulley with firm gripping, but not with normal fist. No obvious locking or catching noted.   A1 pulley is nontender to palpation.  Special tests: normal stability, mild pain with resisted wrist extension, minimal to no pain with resisted middle finger extension, no pain with resisted wrist flexion      X-rays: no new images today.  3 views left wrist, 2 views left forearm, [facility], from 1/6/2022 -- No obvious fractures or dislocations..    MRI left elbow 1/19/2022:  On the background of tendinosis, moderate-grade intrasubstance tear of the common extensor tendon at its proximal attachment.    Assessment: 56 year old male , right -hand dominant, with acute left elbow pain, strain following work injury, underlying lateral epicondylitis        Plan: nonsurgical..  * likely too much too soon.  * will shut down for a few weeks and see where things are at.  * recommend lifting with palm up, supination, as this will take some stress off the extensors.  * continue light exercises for the elbow, forearm.    * counterforce elbow brace/strap, taping as needed.  * wrist brace as needed.  * elbow massage and icing  * shoulder range of motion, gentle  * gentle neck range of motion.    * NDAIDS as needed.    * discussed other options such as Tenex with our sports medicine providers, otherwise surgical debridement/repair. Something to consider in the future as needed.    * return to clinic 3  weeks, clinical recheck. (he notes appointment already scheduled for 6/29/2022)  * consider acupuncture in future as needed, patient inquired about it today, we surely can consider it if needed.  * workability note. Return to work with restrictions, no use of left upper extremity.        Jose Daniel Valenzuela M.D., M.S.  Dept. of Orthopaedic Surgery  [facility]      Again, thank you for allowing me to participate in the care of your patient.        Sincerely,        Jose Daniel Valenzuela MD

## 2022-06-08 NOTE — LETTER
June 8, 2022      Refugio Davies  [address]  [place] [address]        To Whom It May Concern,     Refugio Davies attended clinic here on Jun 8, 2022 and he can return to work with restrictions: no use left upper extremity - strict. Sedentary duties only    Reassess on 6/29/22  If you have questions or concerns, please call the clinic at the number listed above.    Sincerely,         Philipp Odell PA-C, CAQ-OS  Dept. of Orthopedic Surgery  [facility]

## 2022-06-08 NOTE — PROGRESS NOTES
Chief Complaint:   Chief Complaint   Patient presents with     Left Forearm - Pain     Work Comp. DOI: 1/6/22. He was doing well and went back to work on 6/6/22 with no restrictions. He made it through the day with NKI, but has been unable to return to work since do to flare up in pain.       DATE of INJURY: 1/6/2022      HPI: Refugio Davies is a 56 year old male , right -hand dominant, who presents for followup evaluation and management of a left elbow and forearm injury, now about 5 months out. We saw him last week and was doing better, wanting to return to work, which he did on 6/6/2022. Presents today stating he made it through the day, got worse as the day went on, but afterwards pain returned and unable to return to work due to flare up. Pain lateral elbow, down the forearm to the wrist, inner elbow as well. Since then resting, massage, ice.    At last visit, we had discussed a gradual return to work plan, limited hours per day. However, Refugio felt ready to go back without any restrictions. He states after our visit last week he spoke to his martinez about returning, and sounds like the plan was to return but limit himself. However, when he went to work this past Monday, that martinez was out on vacation so whomever was covering had him go back fully.      He had a PRP injection 4/5/2022 with Dr Miranda. Seemed to have helped some.    INJURY:  1/6/2022 while at work, pulling a garbage can out of the snow and felt a pop and pain down the forearm to the hand. Worked through the day with the pain. Presented to the ED that night with xrays of the forearm and wrist negative.          He reports having moderate  pain/discomfort around the injury site.   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   Patient Active Problem List   Diagnosis     CARDIOVASCULAR SCREENING; LDL GOAL LESS THAN 160     Tobacco use disorder     Other idiopathic scoliosis, lumbar region     History of pneumothorax     Neck pain     Acute pain of left shoulder     Elbow pain, left     Pain of left forearm       Past surgical history:  has a past surgical history that includes Open reduction internal fixation rodding intramedullary femur (Right, 1997) and Lung surgery (Right, 2009).     Medications: No current outpatient medications on file.    Allergies:   No Known Allergies     Family History: family history includes Breast Cancer in his mother; Diabetes in his maternal aunt; Thyroid Disease in his brother.     Social History:  reports that he has been smoking cigarettes. He has been smoking about 0.50 packs per day. He has never used smokeless tobacco. He reports current alcohol use. He reports that he does not use drugs.    Review of Systems:     Denies numbness, tingling, parasthesias.   Denies headaches.   Denies fevers, chills, night sweats   Denies chest pain.   Denies shortness of breath.   Denies any skin problems, abrasions, rashes, irritation.      Physical Exam  GENERAL APPEARANCE: healthy, alert, no distress. Accompanied by his QRC  SKIN: no suspicious lesions or rashes  RESPIRATORY: No increased work of breathing.  NEURO: Normal strength and tone, mentation intact and speech normal  PSYCH:  mentation appears normal and affect normal. Not anxious.    Ht 1.854 m (6' 1\")   Wt 87.1 kg (192 lb)   BMI 25.33 kg/m         LEFT ELBOW:  Skin intact. No open wounds. No skin maceration.  Inspection: no swelling, no ecchymosis, no olecranon bursa swelling, no distal bicep tendon defect  Tender: mild to moderate tender to palpation at lateral epicondyle, common extensor tendon    Mild tender to palpation medial epicondyle, " flexor pronator mass.  Non-tender: supracondylar notch, olecranon bursa and distal bicep tendon  Range of Motion: all normal, antecubital discomfort with full flexion.  Strength: elbow strength full  There is palpable crepitus over the flexor tendon of the left index finger at the A1 pulley with firm gripping, but not with normal fist. No obvious locking or catching noted.   A1 pulley is nontender to palpation.  Special tests: normal stability, mild pain with resisted wrist extension, minimal to no pain with resisted middle finger extension, no pain with resisted wrist flexion      X-rays: no new images today.  3 views left wrist, 2 views left forearm, [facility], from 1/6/2022 -- No obvious fractures or dislocations..    MRI left elbow 1/19/2022:  On the background of tendinosis, moderate-grade intrasubstance tear of the common extensor tendon at its proximal attachment.    Assessment: 56 year old male , right -hand dominant, with acute left elbow pain, strain following work injury, underlying lateral epicondylitis        Plan: nonsurgical..  * likely too much too soon.  * will shut down for a few weeks and see where things are at.  * recommend lifting with palm up, supination, as this will take some stress off the extensors.  * continue light exercises for the elbow, forearm.    * counterforce elbow brace/strap, taping as needed.  * wrist brace as needed.  * elbow massage and icing  * shoulder range of motion, gentle  * gentle neck range of motion.    * NDAIDS as needed.    * discussed other options such as Tenex with our sports medicine providers, otherwise surgical debridement/repair. Something to consider in the future as needed.    * return to clinic 3 weeks, clinical recheck. (he notes appointment already scheduled for 6/29/2022)  * consider acupuncture in future as needed, patient inquired about it today, we surely can consider it if needed.  * workability note. Return to work with restrictions, no use of  left upper extremity.        Jose Daniel Valenzuela M.D., M.S.  Dept. of Orthopaedic Surgery  [facility]

## 2022-06-13 PROBLEM — M79.632 PAIN OF LEFT FOREARM: Status: RESOLVED | Noted: 2022-01-24 | Resolved: 2022-06-13

## 2022-06-13 PROBLEM — M25.522 ELBOW PAIN, LEFT: Status: RESOLVED | Noted: 2022-01-24 | Resolved: 2022-06-13

## 2022-06-13 PROBLEM — M25.512 ACUTE PAIN OF LEFT SHOULDER: Status: RESOLVED | Noted: 2022-01-24 | Resolved: 2022-06-13

## 2022-06-13 PROBLEM — M54.2 NECK PAIN: Status: RESOLVED | Noted: 2022-01-24 | Resolved: 2022-06-13

## 2022-06-13 NOTE — PROGRESS NOTES
Patient did not need to return to PT for further treatment.  Please refer to the progress note and goal flowsheet completed on 02/14/22 for discharge information.

## 2022-06-28 NOTE — PROGRESS NOTES
Chief Complaint:   Chief Complaint   Patient presents with     Left Elbow - Pain     Work Comp. Left elbow. Lateral epicondylitis. Has not been working since last visit based upon restrictions. However, he has continued to be bothered by pain. The other day he was just standing and talking and got severe stabbing pain in the forearm. He also had this happen when he reached for a pillow. He now has some numbness/tingling in the small, ring and thumb fingers of the left hand. All of these fingers seem to be affected equally and at that same time.       DATE of INJURY: 1/6/2022      HPI: Refugio Davies is a 56 year old male , right -hand dominant, who presents for followup evaluation and management of a left elbow and forearm injury, now about 5 months out. He returns today after having a flare with return to work on 6/6/2022, so we then cut him back down again with work. He returns today stating he continues with pain. he now has numbness and tingling into his ring and small fingers and the thumb, intermittent stabbing pain into the forearm. The pain with just sitting or when reaching for a pillow.    Pain lateral elbow, down the forearm to the wrist, inner elbow as well.    He had a PRP injection 4/5/2022 with Dr Miranda. Seemed to have helped some.    INJURY:  1/6/2022 while at work, pulling a garbage can out of the snow and felt a pop and pain down the forearm to the hand. Worked through the day with the pain. Presented to the ED that night with xrays of the forearm and wrist negative.          He reports having moderate  pain/discomfort around the injury site. Numbness and tingling into the ring/small fingers, thumb.  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   Patient Active Problem List   Diagnosis     CARDIOVASCULAR SCREENING; LDL GOAL LESS THAN 160     Tobacco use disorder     Other idiopathic scoliosis, lumbar region     History of pneumothorax       Past surgical history:  has a past surgical history that includes Open reduction internal fixation rodding intramedullary femur (Right, 1997) and Lung surgery (Right, 2009).     Medications: No current outpatient medications on file.    Allergies:   No Known Allergies     Family History: family history includes Breast Cancer in his mother; Diabetes in his maternal aunt; Thyroid Disease in his brother.     Social History:  reports that he has been smoking cigarettes. He has been smoking about 0.50 packs per day. He has never used smokeless tobacco. He reports current alcohol use. He reports that he does not use drugs.    Review of Systems:     Denies numbness, tingling, parasthesias.   Denies headaches.   Denies fevers, chills, night sweats   Denies chest pain.   Denies shortness of breath.   Denies any skin problems, abrasions, rashes, irritation.      Physical Exam  GENERAL APPEARANCE: healthy, alert, no distress.  SKIN: no suspicious lesions or rashes  RESPIRATORY: No increased work of breathing.  NEURO: Normal strength and tone, mentation intact and speech normal  PSYCH:  mentation appears normal and affect normal. Not anxious.    BP (!) 170/78   Ht 1.854 m (6' 1\")   Wt 87.1 kg (192 lb)   BMI 25.33 kg/m         LEFT ELBOW:  Skin intact. No open wounds. No skin maceration.  Inspection: no swelling, no ecchymosis, no olecranon bursa swelling, no distal bicep tendon defect  Tender: mild to moderate tender to palpation at lateral epicondyle, common extensor tendon    Mild tender to palpation medial epicondyle, flexor pronator mass.  Non-tender: supracondylar notch, olecranon bursa and distal bicep tendon  Range of Motion: all normal, " antecubital discomfort with full flexion.  Strength: elbow strength full    Positive tinels at the medial elbow over the ulnar nerve.   Positive ulnar nerve compression test over the ulnar nerve at the elbow  Negative tinels at the wrist over the median nerve  Negative median nerve compression test at the wrist.      There is palpable crepitus over the flexor tendon of the left index finger at the A1 pulley with firm gripping, but not with normal fist. No obvious locking or catching noted.   A1 pulley is nontender to palpation.  Special tests: normal stability,    moderate pain with resisted wrist extension, mild-moderate  pain with resisted middle finger extension, mild pain with resisted wrist flexion      X-rays: no new images today.  3 views left wrist, 2 views left forearm, [facility], from 1/6/2022 -- No obvious fractures or dislocations..    MRI left elbow 1/19/2022:  On the background of tendinosis, moderate-grade intrasubstance tear of the common extensor tendon at its proximal attachment.        Assessment: 56 year old male , right -hand dominant, with:  1)  acute left elbow pain, strain following work injury, underlying lateral epicondylitis with partial tearing.  2) acute ulnar neuropathy  3) left index finger trigger finger.        Plan: nonsurgical..  * unclear why onset of numbness and tingling now. Appears to be the ulnar nerve.  * recommend EMG study for evaluation of the nerve.  * recommend lifting with palm up, supination, as this will take some stress off the extensors.  * continue light exercises for the elbow, forearm. Nothing heavy or strenuous.    * counterforce elbow brace/strap, taping as needed.  * wrist brace as needed.  * elbow massage and icing  * shoulder range of motion, gentle  * gentle neck range of motion.    * NDAIDS as needed.    * discussed other options such as Tenex with our sports medicine providers, otherwise surgical debridement/repair. Something to consider in the  future as needed.    * return to clinic 4 weeks, clinical recheck, review of the EMG study.    * workability note. Return to work with restrictions, no use of left upper extremity.        Jose Daniel Valenzuela M.D., M.S.  Dept. of Orthopaedic Surgery  [facility]

## 2022-06-29 ENCOUNTER — OFFICE VISIT (OUTPATIENT)
Dept: ORTHOPEDICS | Facility: CLINIC | Age: 57
End: 2022-06-29
Payer: OTHER MISCELLANEOUS

## 2022-06-29 VITALS
BODY MASS INDEX: 25.45 KG/M2 | HEIGHT: 73 IN | WEIGHT: 192 LBS | SYSTOLIC BLOOD PRESSURE: 170 MMHG | DIASTOLIC BLOOD PRESSURE: 78 MMHG

## 2022-06-29 DIAGNOSIS — M65.322 TRIGGER FINGER, LEFT INDEX FINGER: ICD-10-CM

## 2022-06-29 DIAGNOSIS — M77.12 LATERAL EPICONDYLITIS, LEFT ELBOW: ICD-10-CM

## 2022-06-29 DIAGNOSIS — G56.22 ULNAR NEUROPATHY AT ELBOW OF LEFT UPPER EXTREMITY: ICD-10-CM

## 2022-06-29 DIAGNOSIS — S59.902D ELBOW INJURY, LEFT, SUBSEQUENT ENCOUNTER: Primary | ICD-10-CM

## 2022-06-29 PROCEDURE — 99213 OFFICE O/P EST LOW 20 MIN: CPT | Performed by: ORTHOPAEDIC SURGERY

## 2022-06-29 ASSESSMENT — PAIN SCALES - GENERAL: PAINLEVEL: MODERATE PAIN (5)

## 2022-06-29 NOTE — LETTER
6/29/2022         RE: Refugio Davies  [address] [place] [address]        Dear Colleague,    Thank you for referring your patient, Refugio Davies, to the [facility]. Please see a copy of my visit note below.    Chief Complaint:   Chief Complaint   Patient presents with     Left Elbow - Pain     Work Comp. Left elbow. Lateral epicondylitis. Has not been working since last visit based upon restrictions. However, he has continued to be bothered by pain. The other day he was just standing and talking and got severe stabbing pain in the forearm. He also had this happen when he reached for a pillow. He now has some numbness/tingling in the small, ring and thumb fingers of the left hand. All of these fingers seem to be affected equally and at that same time.       DATE of INJURY: 1/6/2022      HPI: Refugio Davies is a 56 year old male , right -hand dominant, who presents for followup evaluation and management of a left elbow and forearm injury, now about 5 months out. He returns today after having a flare with return to work on 6/6/2022, so we then cut him back down again with work. He returns today stating he continues with pain. he now has numbness and tingling into his ring and small fingers and the thumb, intermittent stabbing pain into the forearm. The pain with just sitting or when reaching for a pillow.    Pain lateral elbow, down the forearm to the wrist, inner elbow as well.    He had a PRP injection 4/5/2022 with Dr Miranda. Seemed to have helped some.    INJURY:  1/6/2022 while at work, pulling a garbage can out of the snow and felt a pop and pain down the forearm to the hand. Worked through the day with the pain. Presented to the ED that night with xrays of the forearm and wrist negative.          He reports having moderate  pain/discomfort around the injury site. Numbness and tingling into the ring/small fingers, thumb.  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   Patient Active Problem List   Diagnosis     CARDIOVASCULAR SCREENING; LDL GOAL LESS THAN 160     Tobacco use disorder     Other idiopathic scoliosis, lumbar region     History of pneumothorax       Past surgical history:  has a past surgical history that includes Open reduction internal fixation rodding intramedullary femur (Right, 1997) and Lung surgery (Right, 2009).     Medications: No current outpatient medications on file.    Allergies:   No Known Allergies     Family History: family history includes Breast Cancer in his mother; Diabetes in his maternal aunt; Thyroid Disease in his brother.     Social History:  reports that he has been smoking cigarettes. He has been smoking about 0.50 packs per day. He has never used smokeless tobacco. He reports current alcohol use. He reports that he does not use drugs.    Review of Systems:     Denies numbness, tingling, parasthesias.   Denies headaches.   Denies fevers, chills, night sweats   Denies chest pain.   Denies shortness of breath.   Denies any skin problems, abrasions, rashes, irritation.      Physical Exam  GENERAL APPEARANCE: healthy, alert, no distress.  SKIN: no suspicious lesions or rashes  RESPIRATORY: No increased work of breathing.  NEURO: Normal strength and tone, mentation intact and speech normal  PSYCH:  mentation appears normal and affect normal. Not anxious.    BP (!) 170/78   Ht 1.854 m (6' 1\")   Wt 87.1 kg (192 lb)   BMI 25.33 kg/m         LEFT ELBOW:  Skin intact. No open wounds. No skin maceration.  Inspection: no swelling, no ecchymosis, no olecranon bursa swelling, no distal bicep tendon defect  Tender: " mild to moderate tender to palpation at lateral epicondyle, common extensor tendon    Mild tender to palpation medial epicondyle, flexor pronator mass.  Non-tender: supracondylar notch, olecranon bursa and distal bicep tendon  Range of Motion: all normal, antecubital discomfort with full flexion.  Strength: elbow strength full    Positive tinels at the medial elbow over the ulnar nerve.   Positive ulnar nerve compression test over the ulnar nerve at the elbow  Negative tinels at the wrist over the median nerve  Negative median nerve compression test at the wrist.      There is palpable crepitus over the flexor tendon of the left index finger at the A1 pulley with firm gripping, but not with normal fist. No obvious locking or catching noted.   A1 pulley is nontender to palpation.  Special tests: normal stability,    moderate pain with resisted wrist extension, mild-moderate  pain with resisted middle finger extension, mild pain with resisted wrist flexion      X-rays: no new images today.  3 views left wrist, 2 views left forearm, [facility], from 1/6/2022 -- No obvious fractures or dislocations..    MRI left elbow 1/19/2022:  On the background of tendinosis, moderate-grade intrasubstance tear of the common extensor tendon at its proximal attachment.        Assessment: 56 year old male , right -hand dominant, with:  1)  acute left elbow pain, strain following work injury, underlying lateral epicondylitis with partial tearing.  2) acute ulnar neuropathy  3) left index finger trigger finger.        Plan: nonsurgical..  * unclear why onset of numbness and tingling now. Appears to be the ulnar nerve.  * recommend EMG study for evaluation of the nerve.  * recommend lifting with palm up, supination, as this will take some stress off the extensors.  * continue light exercises for the elbow, forearm. Nothing heavy or strenuous.    * counterforce elbow brace/strap, taping as needed.  * wrist brace as needed.  * elbow  massage and icing  * shoulder range of motion, gentle  * gentle neck range of motion.    * NDAIDS as needed.    * discussed other options such as Tenex with our sports medicine providers, otherwise surgical debridement/repair. Something to consider in the future as needed.    * return to clinic 4 weeks, clinical recheck, review of the EMG study.    * workability note. Return to work with restrictions, no use of left upper extremity.        Jose Daniel Valenzuela M.D., M.S.  Dept. of Orthopaedic Surgery  [facility]      Again, thank you for allowing me to participate in the care of your patient.        Sincerely,        Jose Daniel Valenzuela MD

## 2022-06-29 NOTE — LETTER
June 29, 2022      RE: Refugio Davies  [address]  [place] [address]          To whom it may concern:     Refugio Davies is under my care for   1. Elbow injury, left, subsequent encounter    2. Lateral epicondylitis, left elbow    .     Work related injury: Yes       Date of injury: 1/6/22          Return to work date: 6/29/22     ** WITH RESTRICTIONS? Yes, with work restrictions: * Other: no use left upper extremity - strict. Sedentary duties only      Sincerely,          Electronically Signed (as below)  Dr. Jose Daniel Valenzuela M.D.

## 2022-06-30 ENCOUNTER — TELEPHONE (OUTPATIENT)
Dept: SURGERY | Facility: CLINIC | Age: 57
End: 2022-06-30

## 2022-06-30 DIAGNOSIS — S59.902D ELBOW INJURY, LEFT, SUBSEQUENT ENCOUNTER: Primary | ICD-10-CM

## 2022-06-30 NOTE — TELEPHONE ENCOUNTER
Christo reached out regarding EMG. Would like a call back to discuss. Says [facility] has opening on July 14th.   Needs info faxed to: [phone]    Christo    Phone: [phone]    Rose

## 2022-07-01 NOTE — TELEPHONE ENCOUNTER
I spoke to Christo and Refugio actually now has an appointment on 7/11 at [facility] clinic of neurology. He requested that I fax over the referral to them. The fax number is [phone]. I did this.    Philipp Odell PA-C, CAQ-OS  Dept. of Orthopedic Surgery  [facility]

## 2022-07-15 ENCOUNTER — TRANSFERRED RECORDS (OUTPATIENT)
Dept: ORTHOPEDICS | Facility: CLINIC | Age: 57
End: 2022-07-15

## 2022-07-18 NOTE — PROGRESS NOTES
"Chief Complaint:   Chief Complaint   Patient presents with     Left Forearm - Pain     Here for EMG follow-up. Really no change in symptoms.       DATE of INJURY: 1/6/2022      HPI: Refugio Davies is a 56 year old male , right -hand dominant, who presents for followup evaluation and management of a left elbow and forearm injury, now over 6 months out. Here with recent EMG, no real changes since previous visit. Still gets intermittent pain in the elbow and down the forearm \"out of the blue\" at rest, watching TV, etc. Lasts for about 20-30 minutes then goes away. Still some numbness and tingling into the ring/small fingers at times, not constant.    Recall he was doing well, but had a flare with return to work on 6/6/2022, so we then cut him back down again with work. He then numbness and tingling into his ring and small fingers and the thumb, intermittent stabbing pain into the forearm. The pain with just sitting or when reaching for a pillow.    Pain lateral elbow, down the forearm to the wrist, inner elbow as well.    He had a PRP injection 4/5/2022 with Dr Miranda. Seemed to have helped some. Has not had a cortisone injection.    INJURY:  1/6/2022 while at work, pulling a garbage can out of the snow and felt a pop and pain down the forearm to the hand. Worked through the day with the pain. Presented to the ED that night with xrays of the forearm and wrist negative.          He reports having moderate  pain/discomfort around the injury site. Intermittent numbness and tingling into the ring/small fingers, thumb.  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   Patient Active " "Problem List   Diagnosis     CARDIOVASCULAR SCREENING; LDL GOAL LESS THAN 160     Tobacco use disorder     Other idiopathic scoliosis, lumbar region     History of pneumothorax       Past surgical history:  has a past surgical history that includes Open reduction internal fixation rodding intramedullary femur (Right, 1997) and Lung surgery (Right, 2009).     Medications: No current outpatient medications on file.    Allergies:   No Known Allergies     Family History: family history includes Breast Cancer in his mother; Diabetes in his maternal aunt; Thyroid Disease in his brother.     Social History:  reports that he has been smoking cigarettes. He has been smoking about 0.50 packs per day. He has never used smokeless tobacco. He reports current alcohol use. He reports that he does not use drugs.    Review of Systems:     Denies numbness, tingling, parasthesias.   Denies headaches.   Denies fevers, chills, night sweats   Denies chest pain.   Denies shortness of breath.   Denies any skin problems, abrasions, rashes, irritation.      Physical Exam  GENERAL APPEARANCE: healthy, alert, no distress.  SKIN: no suspicious lesions or rashes  RESPIRATORY: No increased work of breathing.  NEURO: Normal strength and tone, mentation intact and speech normal  PSYCH:  mentation appears normal and affect normal. Not anxious.    Ht 1.854 m (6' 1\")   Wt 87.1 kg (192 lb)   BMI 25.33 kg/m         LEFT ELBOW:  Skin intact. No open wounds. No skin maceration.  Inspection: no swelling, no ecchymosis, no olecranon bursa swelling, no distal bicep tendon defect  Tender: mild to moderate tender to palpation at lateral epicondyle, common extensor tendon    Mild tender to palpation medial epicondyle, flexor pronator mass.  Non-tender: supracondylar notch, olecranon bursa and distal bicep tendon  Range of Motion: all normal, antecubital discomfort with full flexion.  Strength: elbow strength full    Positive tinels at the medial elbow over " the ulnar nerve.   Positive ulnar nerve compression test over the ulnar nerve at the elbow  Negative tinels at the wrist over the median nerve  Negative median nerve compression test at the wrist.    Special tests: normal stability,    moderate pain with resisted wrist extension, mild-moderate  pain with resisted middle finger extension, mild pain with resisted wrist flexion        EMG [facility] [place] [facility], 7/15/2022: Essentially normal study of the left upper extremity. Given the clinical symptoms, there is no compression of the ulnar nerve at the elbow. There is no electrophysiologic evidence of radiculopathy, plexopathy, neuropathy or myopathy.        X-rays: no new images today.  3 views left wrist, 2 views left forearm, [facility], from 1/6/2022 -- No obvious fractures or dislocations..    MRI left elbow 1/19/2022:  On the background of tendinosis, moderate-grade intrasubstance tear of the common extensor tendon at its proximal attachment.        Assessment: 56 year old male , right -hand dominant, with:  1)  acute left elbow pain, strain following work injury, underlying lateral epicondylitis with partial tearing.  2) acute ulnar neuropathy          Plan: nonsurgical..  * unclear why onset of numbness and tingling. Appears to be the ulnar nerve. Not sure if related to therapy, activity modifcation, positioning, etc.  *  EMG normal, but clinically there is some ulnar neuropathy.  * recommend lifting with palm up, supination, as this will take some stress off the extensors.  * continue light exercises for the elbow, forearm. Nothing heavy or strenuous.    * counterforce elbow brace/strap, taping as needed.  * wrist brace as needed.  * elbow massage and icing  * shoulder range of motion, gentle  * gentle neck range of motion.  * discussed trial of cortisone injection, as he's not had one yet. He'd like to try that.    * NDAIDS as needed.    * discussed other options such as Tenex with our sports  medicine providers, otherwise surgical debridement/repair. Something to consider in the future as needed.    * return to clinic 4 weeks, clinical recheck    * workability note. Return to work with restrictions, no use of left upper extremity.        Jose Daniel Valenzuela M.D., M.S.  Dept. of Orthopaedic Surgery  [facility]    Hand / Upper Extremity Injection/Arthrocentesis: L elbow    Date/Time: 7/20/2022 3:00 PM  Performed by: Philipp Odell PA  Authorized by: Jose Daniel Valenzuela MD     Indications:  Pain and tendon swelling  Needle Size:  22 G  Guidance: landmark    Approach:  Lateral  Condition: epicondylitis, lateral      Site:  L elbow  Medications:  40 mg triamcinolone 40 MG/ML; 1 mL lidocaine 1 %  Outcome:  Tolerated well, no immediate complications  Procedure discussed: discussed risks, benefits, and alternatives    Timeout: timeout called immediately prior to procedure    Prep: patient was prepped and draped in usual sterile fashion

## 2022-07-20 ENCOUNTER — OFFICE VISIT (OUTPATIENT)
Dept: ORTHOPEDICS | Facility: CLINIC | Age: 57
End: 2022-07-20
Payer: OTHER MISCELLANEOUS

## 2022-07-20 VITALS — WEIGHT: 192 LBS | HEIGHT: 73 IN | BODY MASS INDEX: 25.45 KG/M2

## 2022-07-20 DIAGNOSIS — S59.902D ELBOW INJURY, LEFT, SUBSEQUENT ENCOUNTER: Primary | ICD-10-CM

## 2022-07-20 DIAGNOSIS — M77.12 LATERAL EPICONDYLITIS, LEFT ELBOW: ICD-10-CM

## 2022-07-20 DIAGNOSIS — G56.22 ULNAR NEUROPATHY AT ELBOW, LEFT: ICD-10-CM

## 2022-07-20 PROCEDURE — 99213 OFFICE O/P EST LOW 20 MIN: CPT | Mod: 25 | Performed by: ORTHOPAEDIC SURGERY

## 2022-07-20 PROCEDURE — 20551 NJX 1 TENDON ORIGIN/INSJ: CPT | Mod: LT | Performed by: ORTHOPAEDIC SURGERY

## 2022-07-20 RX ADMIN — LIDOCAINE HYDROCHLORIDE 1 ML: 10 INJECTION, SOLUTION INFILTRATION; PERINEURAL at 15:00

## 2022-07-20 RX ADMIN — TRIAMCINOLONE ACETONIDE 40 MG: 40 INJECTION, SUSPENSION INTRA-ARTICULAR; INTRAMUSCULAR at 15:00

## 2022-07-20 ASSESSMENT — PAIN SCALES - GENERAL: PAINLEVEL: MILD PAIN (3)

## 2022-07-20 NOTE — LETTER
July 20, 2022      Refugio Daveis  [address]  [place] [id number]        To Whom It May Concern,     Refugio Davies attended clinic here on Jul 20, 2022 and he can return to work with restrictions: no use of left upper extremity - strict. Sedentary duties only.  Follow-up in 4 weeks.  If you have questions or concerns, please call the clinic at the number listed above.    Sincerely,       Philipp Odell PA-C, CAQ-OS  Dept. of Orthopedic Surgery  [facility]

## 2022-07-20 NOTE — LETTER
"    7/20/2022         RE: Refugio Davies  [address]  [place] [address]        Dear Colleague,    Thank you for referring your patient, Refugio Davies, to the [facility]. Please see a copy of my visit note below.    Chief Complaint:   Chief Complaint   Patient presents with     Left Forearm - Pain     Here for EMG follow-up. Really no change in symptoms.       DATE of INJURY: 1/6/2022      HPI: Refugio Davies is a 56 year old male , right -hand dominant, who presents for followup evaluation and management of a left elbow and forearm injury, now over 6 months out. Here with recent EMG, no real changes since previous visit. Still gets intermittent pain in the elbow and down the forearm \"out of the blue\" at rest, watching TV, etc. Lasts for about 20-30 minutes then goes away. Still some numbness and tingling into the ring/small fingers at times, not constant.    Recall he was doing well, but had a flare with return to work on 6/6/2022, so we then cut him back down again with work. He then numbness and tingling into his ring and small fingers and the thumb, intermittent stabbing pain into the forearm. The pain with just sitting or when reaching for a pillow.    Pain lateral elbow, down the forearm to the wrist, inner elbow as well.    He had a PRP injection 4/5/2022 with Dr Miranda. Seemed to have helped some. Has not had a cortisone injection.    INJURY:  1/6/2022 while at work, pulling a garbage can out of the snow and felt a pop and pain down the forearm to the hand. Worked through the day with the pain. Presented to the ED that night with xrays of the forearm and wrist negative.          He reports having moderate  pain/discomfort around the injury site. Intermittent numbness and tingling into the ring/small fingers, thumb.  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   Patient Active Problem List   Diagnosis     CARDIOVASCULAR SCREENING; LDL GOAL LESS THAN 160     Tobacco use disorder     Other idiopathic scoliosis, lumbar region     History of pneumothorax       Past surgical history:  has a past surgical history that includes Open reduction internal fixation rodding intramedullary femur (Right, 1997) and Lung surgery (Right, 2009).     Medications: No current outpatient medications on file.    Allergies:   No Known Allergies     Family History: family history includes Breast Cancer in his mother; Diabetes in his maternal aunt; Thyroid Disease in his brother.     Social History:  reports that he has been smoking cigarettes. He has been smoking about 0.50 packs per day. He has never used smokeless tobacco. He reports current alcohol use. He reports that he does not use drugs.    Review of Systems:     Denies numbness, tingling, parasthesias.   Denies headaches.   Denies fevers, chills, night sweats   Denies chest pain.   Denies shortness of breath.   Denies any skin problems, abrasions, rashes, irritation.      Physical Exam  GENERAL APPEARANCE: healthy, alert, no distress.  SKIN: no suspicious lesions or rashes  RESPIRATORY: No increased work of breathing.  NEURO: Normal strength and tone, mentation intact and speech normal  PSYCH:  mentation appears normal and affect normal. Not anxious.    Ht 1.854 m (6' 1\")   Wt 87.1 kg (192 lb)   BMI 25.33 kg/m         LEFT ELBOW:  Skin intact. No open wounds. No skin maceration.  Inspection: no swelling, no ecchymosis, no olecranon bursa swelling, no distal bicep tendon defect  Tender: mild to moderate tender to palpation at lateral epicondyle, common extensor tendon    Mild tender to palpation medial " epicondyle, flexor pronator mass.  Non-tender: supracondylar notch, olecranon bursa and distal bicep tendon  Range of Motion: all normal, antecubital discomfort with full flexion.  Strength: elbow strength full    Positive tinels at the medial elbow over the ulnar nerve.   Positive ulnar nerve compression test over the ulnar nerve at the elbow  Negative tinels at the wrist over the median nerve  Negative median nerve compression test at the wrist.    Special tests: normal stability,    moderate pain with resisted wrist extension, mild-moderate  pain with resisted middle finger extension, mild pain with resisted wrist flexion        EMG [facility], 7/15/2022: Essentially normal study of the left upper extremity. Given the clinical symptoms, there is no compression of the ulnar nerve at the elbow. There is no electrophysiologic evidence of radiculopathy, plexopathy, neuropathy or myopathy.        X-rays: no new images today.  3 views left wrist, 2 views left forearm, [facility], from 1/6/2022 -- No obvious fractures or dislocations..    MRI left elbow 1/19/2022:  On the background of tendinosis, moderate-grade intrasubstance tear of the common extensor tendon at its proximal attachment.        Assessment: 56 year old male , right -hand dominant, with:  1)  acute left elbow pain, strain following work injury, underlying lateral epicondylitis with partial tearing.  2) acute ulnar neuropathy          Plan: nonsurgical..  * unclear why onset of numbness and tingling. Appears to be the ulnar nerve. Not sure if related to therapy, activity modifcation, positioning, etc.  *  EMG normal, but clinically there is some ulnar neuropathy.  * recommend lifting with palm up, supination, as this will take some stress off the extensors.  * continue light exercises for the elbow, forearm. Nothing heavy or strenuous.    * counterforce elbow brace/strap, taping as needed.  * wrist brace as needed.  * elbow massage  and icing  * shoulder range of motion, gentle  * gentle neck range of motion.  * discussed trial of cortisone injection, as he's not had one yet. He'd like to try that.    * NDAIDS as needed.    * discussed other options such as Tenex with our sports medicine providers, otherwise surgical debridement/repair. Something to consider in the future as needed.    * return to clinic 4 weeks, clinical recheck    * workability note. Return to work with restrictions, no use of left upper extremity.        Jose Daniel Valenzuela M.D., M.S.  Dept. of Orthopaedic Surgery  [facility]    Hand / Upper Extremity Injection/Arthrocentesis: L elbow    Date/Time: 7/20/2022 3:00 PM  Performed by: Philipp Odell PA  Authorized by: Jose Daniel Valenzuela MD     Indications:  Pain and tendon swelling  Needle Size:  22 G  Guidance: landmark    Approach:  Lateral  Condition: epicondylitis, lateral      Site:  L elbow  Medications:  40 mg triamcinolone 40 MG/ML; 1 mL lidocaine 1 %  Outcome:  Tolerated well, no immediate complications  Procedure discussed: discussed risks, benefits, and alternatives    Timeout: timeout called immediately prior to procedure    Prep: patient was prepped and draped in usual sterile fashion              Again, thank you for allowing me to participate in the care of your patient.        Sincerely,        Jose Daniel Valenzuela MD

## 2022-07-21 RX ORDER — LIDOCAINE HYDROCHLORIDE 10 MG/ML
1 INJECTION, SOLUTION INFILTRATION; PERINEURAL
Status: DISCONTINUED | OUTPATIENT
Start: 2022-07-20 | End: 2022-11-23

## 2022-07-21 RX ORDER — TRIAMCINOLONE ACETONIDE 40 MG/ML
40 INJECTION, SUSPENSION INTRA-ARTICULAR; INTRAMUSCULAR
Status: DISCONTINUED | OUTPATIENT
Start: 2022-07-20 | End: 2022-11-23

## 2022-08-16 NOTE — PROGRESS NOTES
"Chief Complaint:   Chief Complaint   Patient presents with     Left Elbow - Follow Up     Left elbow follow up, work comp injury DOI 1/6/22. No changes. Cortisone injection on 7/20/22, provided relief for approximately 5 days.       DATE of INJURY: 1/6/2022      HPI: Refugio Davies is a 56 year old male , right -hand dominant, who presents for followup evaluation and management of a left elbow and forearm injury, now over 7 months out. Had left elbow cortisone injection with about 5 days improvement. Otherwise no change.    Still gets intermittent pain in the elbow and down the forearm \"out of the blue\" at rest, watching TV, etc. Lasts for about 20-30 minutes then goes away. Still some numbness and tingling into the ring/small fingers at times, not constant.    Overall he states things are \"that bad\", just gets those intermittent twinges of pain.    Recall he was doing well, but had a flare with return to work on 6/6/2022, so we then cut him back down again with work. He then numbness and tingling into his ring and small fingers and the thumb, intermittent stabbing pain into the forearm. The pain with just sitting or when reaching for a pillow.    He had a PRP injection 4/5/2022 with Dr Miranda. Seemed to have helped some.     INJURY:  1/6/2022 while at work, pulling a garbage can out of the snow and felt a pop and pain down the forearm to the hand. Worked through the day with the pain. Presented to the ED that night with xrays of the forearm and wrist negative.          He reports having moderate  pain/discomfort around the injury site. Intermittent numbness and tingling into the ring/small fingers, thumb.  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   Patient Active Problem List   Diagnosis     CARDIOVASCULAR SCREENING; LDL GOAL LESS THAN 160     Tobacco use disorder     Other idiopathic scoliosis, lumbar region     History of pneumothorax       Past surgical history:  has a past surgical history that includes Open reduction internal fixation rodding intramedullary femur (Right, 1997) and Lung surgery (Right, 2009).     Medications: No current outpatient medications on file.    Current Facility-Administered Medications:      lidocaine 1 % injection 1 mL, 1 mL, , , Jose Daniel Valenzuela MD, 1 mL at 07/20/22 1500     triamcinolone (KENALOG-40) injection 40 mg, 40 mg, , , Jose Daniel Valenzuela MD, 40 mg at 07/20/22 1500    Allergies:   No Known Allergies     Family History: family history includes Breast Cancer in his mother; Diabetes in his maternal aunt; Thyroid Disease in his brother.     Social History:  reports that he has been smoking cigarettes. He has been smoking about 0.50 packs per day. He has never used smokeless tobacco. He reports current alcohol use. He reports that he does not use drugs.    Review of Systems:     Denies numbness, tingling, parasthesias.   Denies headaches.   Denies fevers, chills, night sweats   Denies chest pain.   Denies shortness of breath.   Denies any skin problems, abrasions, rashes, irritation.      Physical Exam  GENERAL APPEARANCE: healthy, alert, no distress. Accompanied by [facility]  SKIN: no suspicious lesions or rashes  RESPIRATORY: No increased work of breathing.  NEURO: Normal strength and tone, mentation intact and speech normal  PSYCH:  mentation appears normal and affect normal. Not anxious.    Resp 18   Ht 1.854 m (6' 1\")   Wt 87.1 kg (192 lb)   BMI 25.33 kg/m         LEFT ELBOW:  Skin intact. No open wounds. No skin maceration.  Inspection: no swelling, no ecchymosis, no olecranon bursa swelling, no distal bicep tendon defect  Tender: mild " tender to palpation at lateral epicondyle, common extensor tendon    Mild tender to palpation medial epicondyle, flexor pronator mass.  Non-tender: supracondylar notch, olecranon bursa and distal bicep tendon  Range of Motion: all normal, antecubital discomfort with full flexion.  Strength: elbow strength full      EMG [facility], 7/15/2022: Essentially normal study of the left upper extremity. Given the clinical symptoms, there is no compression of the ulnar nerve at the elbow. There is no electrophysiologic evidence of radiculopathy, plexopathy, neuropathy or myopathy.        X-rays: no new images today.  3 views left wrist, 2 views left forearm, [facility], from 1/6/2022 -- No obvious fractures or dislocations..    MRI left elbow 1/19/2022:  On the background of tendinosis, moderate-grade intrasubstance tear of the common extensor tendon at its proximal attachment.        Assessment: 56 year old male , right -hand dominant, with:  1)  acute left elbow pain, strain following work injury, underlying lateral epicondylitis with partial tearing.  2) acute ulnar neuropathy          Plan: nonsurgical..    * discussed other options such as Tenex with our sports medicine providers; he seems interested so will place a referral.    * recommend lifting with palm up, supination, as this will take some stress off the extensors.  * continue light exercises for the elbow, forearm. Nothing heavy or strenuous.    * counterforce elbow brace/strap, taping as needed.  * wrist brace as needed.  * elbow massage and icing  * shoulder range of motion, gentle  * gentle neck range of motion.  * NDAIDS as needed.        * return to clinic 4 weeks AFTER he has the Tenex procedure with Sports Medicine, clinical recheck    * workability note. Return to work with restrictions, no use of left upper extremity.        Jose Daniel Valenzuela M.D., M.S.  Dept. of Orthopaedic Surgery  [facility]

## 2022-08-17 ENCOUNTER — OFFICE VISIT (OUTPATIENT)
Dept: ORTHOPEDICS | Facility: CLINIC | Age: 57
End: 2022-08-17
Payer: OTHER MISCELLANEOUS

## 2022-08-17 VITALS — BODY MASS INDEX: 25.45 KG/M2 | RESPIRATION RATE: 18 BRPM | WEIGHT: 192 LBS | HEIGHT: 73 IN

## 2022-08-17 DIAGNOSIS — S59.902D ELBOW INJURY, LEFT, SUBSEQUENT ENCOUNTER: Primary | ICD-10-CM

## 2022-08-17 DIAGNOSIS — M77.12 LATERAL EPICONDYLITIS, LEFT ELBOW: ICD-10-CM

## 2022-08-17 PROCEDURE — 99213 OFFICE O/P EST LOW 20 MIN: CPT | Performed by: ORTHOPAEDIC SURGERY

## 2022-08-17 ASSESSMENT — PAIN SCALES - GENERAL: PAINLEVEL: MILD PAIN (3)

## 2022-08-17 NOTE — LETTER
"    8/17/2022         RE: Refugio Davies  [address] [address] [facility] [address] [address]        Dear Colleague,    Thank you for referring your patient, Refugio Davies, to the [facility]. Please see a copy of my visit note below.    Chief Complaint:   Chief Complaint   Patient presents with     Left Elbow - Follow Up     Left elbow follow up, work comp injury DOI 1/6/22. No changes. Cortisone injection on 7/20/22, provided relief for approximately 5 days.       DATE of INJURY: 1/6/2022      HPI: Refugio Davies is a 56 year old male , right -hand dominant, who presents for followup evaluation and management of a left elbow and forearm injury, now over 7 months out. Had left elbow cortisone injection with about 5 days improvement. Otherwise no change.    Still gets intermittent pain in the elbow and down the forearm \"out of the blue\" at rest, watching TV, etc. Lasts for about 20-30 minutes then goes away. Still some numbness and tingling into the ring/small fingers at times, not constant.    Overall he states things are \"that bad\", just gets those intermittent twinges of pain.    Recall he was doing well, but had a flare with return to work on 6/6/2022, so we then cut him back down again with work. He then numbness and tingling into his ring and small fingers and the thumb, intermittent stabbing pain into the forearm. The pain with just sitting or when reaching for a pillow.    He had a PRP injection 4/5/2022 with Dr Miranda. Seemed to have helped some.     INJURY:  1/6/2022 while at work, pulling a garbage can out of the snow and felt a pop and pain down the forearm to the hand. Worked through the day with the pain. Presented to the ED that night with xrays of the forearm and wrist negative.          He reports having moderate  pain/discomfort around the injury site. Intermittent numbness and tingling into the ring/small fingers, thumb.  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   Patient Active Problem List   Diagnosis     CARDIOVASCULAR SCREENING; LDL GOAL LESS THAN 160     Tobacco use disorder     Other idiopathic scoliosis, lumbar region     History of pneumothorax       Past surgical history:  has a past surgical history that includes Open reduction internal fixation rodding intramedullary femur (Right, 1997) and Lung surgery (Right, 2009).     Medications: No current outpatient medications on file.    Current Facility-Administered Medications:      lidocaine 1 % injection 1 mL, 1 mL, , , Jose Daniel Valenzuela MD, 1 mL at 07/20/22 1500     triamcinolone (KENALOG-40) injection 40 mg, 40 mg, , , Jose Daniel Valenzuela MD, 40 mg at 07/20/22 1500    Allergies:   No Known Allergies     Family History: family history includes Breast Cancer in his mother; Diabetes in his maternal aunt; Thyroid Disease in his brother.     Social History:  reports that he has been smoking cigarettes. He has been smoking about 0.50 packs per day. He has never used smokeless tobacco. He reports current alcohol use. He reports that he does not use drugs.    Review of Systems:     Denies numbness, tingling, parasthesias.   Denies headaches.   Denies fevers, chills, night sweats   Denies chest pain.   Denies shortness of breath.   Denies any skin problems, abrasions, rashes, irritation.      Physical Exam  GENERAL APPEARANCE: healthy, alert, no distress. Accompanied by C  SKIN: no suspicious lesions or rashes  RESPIRATORY: No increased work of breathing.  NEURO: Normal strength and tone, mentation intact and speech normal  PSYCH:  mentation appears normal and affect normal. Not  "anxious.    Resp 18   Ht 1.854 m (6' 1\")   Wt 87.1 kg (192 lb)   BMI 25.33 kg/m         LEFT ELBOW:  Skin intact. No open wounds. No skin maceration.  Inspection: no swelling, no ecchymosis, no olecranon bursa swelling, no distal bicep tendon defect  Tender: mild tender to palpation at lateral epicondyle, common extensor tendon    Mild tender to palpation medial epicondyle, flexor pronator mass.  Non-tender: supracondylar notch, olecranon bursa and distal bicep tendon  Range of Motion: all normal, antecubital discomfort with full flexion.  Strength: elbow strength full      [facility], 7/15/2022: Essentially normal study of the left upper extremity. Given the clinical symptoms, there is no compression of the ulnar nerve at the elbow. There is no electrophysiologic evidence of radiculopathy, plexopathy, neuropathy or myopathy.        X-rays: no new images today.  3 views left wrist, 2 views left forearm, [facility], from 1/6/2022 -- No obvious fractures or dislocations..    MRI left elbow 1/19/2022:  On the background of tendinosis, moderate-grade intrasubstance tear of the common extensor tendon at its proximal attachment.        Assessment: 56 year old male , right -hand dominant, with:  1)  acute left elbow pain, strain following work injury, underlying lateral epicondylitis with partial tearing.  2) acute ulnar neuropathy          Plan: nonsurgical..    * discussed other options such as Tenex with our sports medicine providers; he seems interested so will place a referral.    * recommend lifting with palm up, supination, as this will take some stress off the extensors.  * continue light exercises for the elbow, forearm. Nothing heavy or strenuous.    * counterforce elbow brace/strap, taping as needed.  * wrist brace as needed.  * elbow massage and icing  * shoulder range of motion, gentle  * gentle neck range of motion.  * NDAIDS as needed.        * return to clinic 4 weeks AFTER he " has the Tenex procedure with Sports Medicine, clinical recheck    * workability note. Return to work with restrictions, no use of left upper extremity.        Jose Daniel Valenzuela M.D., M.S.  Dept. of Orthopaedic Surgery  [facility]        Again, thank you for allowing me to participate in the care of your patient.        Sincerely,        Jose Daniel Valenzuela MD

## 2022-08-17 NOTE — LETTER
August 17, 2022      Refugio Davies  [address]  [place] [address]        To Whom It May Concern,     Refugio Davies attended clinic here on 8/17/2022 and he can return to work with restrictions: no use of left upper extremity - strict. Sedentary duties only.     Order placed for TENEX procedure.    Follow-up in 4 weeks following TENEX procedure.    If you have questions or concerns, please call the clinic at the number listed above.    Sincerely,       Jose Daniel Valenzuela MD  Dept. of Orthopedic Surgery  [facility]

## 2022-08-18 ENCOUNTER — OFFICE VISIT (OUTPATIENT)
Dept: FAMILY MEDICINE | Facility: CLINIC | Age: 57
End: 2022-08-18
Payer: COMMERCIAL

## 2022-08-18 VITALS
BODY MASS INDEX: 24.81 KG/M2 | OXYGEN SATURATION: 97 % | RESPIRATION RATE: 20 BRPM | HEIGHT: 73 IN | SYSTOLIC BLOOD PRESSURE: 134 MMHG | TEMPERATURE: 97.6 F | DIASTOLIC BLOOD PRESSURE: 70 MMHG | WEIGHT: 187.2 LBS | HEART RATE: 109 BPM

## 2022-08-18 DIAGNOSIS — Z13.220 SCREENING FOR HYPERLIPIDEMIA: ICD-10-CM

## 2022-08-18 DIAGNOSIS — Z00.00 ROUTINE GENERAL MEDICAL EXAMINATION AT A HEALTH CARE FACILITY: Primary | ICD-10-CM

## 2022-08-18 DIAGNOSIS — Z12.11 SCREEN FOR COLON CANCER: ICD-10-CM

## 2022-08-18 DIAGNOSIS — Z28.21 VACCINATION NOT CARRIED OUT BECAUSE OF PATIENT REFUSAL: ICD-10-CM

## 2022-08-18 DIAGNOSIS — Z11.59 NEED FOR HEPATITIS C SCREENING TEST: ICD-10-CM

## 2022-08-18 DIAGNOSIS — Z23 NEED FOR VACCINATION: ICD-10-CM

## 2022-08-18 DIAGNOSIS — Z12.5 SCREENING FOR PROSTATE CANCER: ICD-10-CM

## 2022-08-18 DIAGNOSIS — M54.41 RIGHT-SIDED LOW BACK PAIN WITH RIGHT-SIDED SCIATICA, UNSPECIFIED CHRONICITY: ICD-10-CM

## 2022-08-18 LAB
CHOLEST SERPL-MCNC: 231 MG/DL
FASTING STATUS PATIENT QL REPORTED: NO
FASTING STATUS PATIENT QL REPORTED: NO
GLUCOSE BLD-MCNC: 101 MG/DL (ref 70–99)
HDLC SERPL-MCNC: 48 MG/DL
LDLC SERPL CALC-MCNC: 149 MG/DL
NONHDLC SERPL-MCNC: 183 MG/DL
PSA SERPL-MCNC: 0.84 UG/L (ref 0–4)
TRIGL SERPL-MCNC: 172 MG/DL

## 2022-08-18 PROCEDURE — 86803 HEPATITIS C AB TEST: CPT | Performed by: FAMILY MEDICINE

## 2022-08-18 PROCEDURE — 99213 OFFICE O/P EST LOW 20 MIN: CPT | Mod: 25 | Performed by: FAMILY MEDICINE

## 2022-08-18 PROCEDURE — 99396 PREV VISIT EST AGE 40-64: CPT | Mod: 25 | Performed by: FAMILY MEDICINE

## 2022-08-18 PROCEDURE — 90472 IMMUNIZATION ADMIN EACH ADD: CPT | Performed by: FAMILY MEDICINE

## 2022-08-18 PROCEDURE — 36415 COLL VENOUS BLD VENIPUNCTURE: CPT | Performed by: FAMILY MEDICINE

## 2022-08-18 PROCEDURE — 80061 LIPID PANEL: CPT | Performed by: FAMILY MEDICINE

## 2022-08-18 PROCEDURE — 90750 HZV VACC RECOMBINANT IM: CPT | Performed by: FAMILY MEDICINE

## 2022-08-18 PROCEDURE — 90677 PCV20 VACCINE IM: CPT | Performed by: FAMILY MEDICINE

## 2022-08-18 PROCEDURE — 90471 IMMUNIZATION ADMIN: CPT | Performed by: FAMILY MEDICINE

## 2022-08-18 PROCEDURE — G0103 PSA SCREENING: HCPCS | Performed by: FAMILY MEDICINE

## 2022-08-18 PROCEDURE — 82947 ASSAY GLUCOSE BLOOD QUANT: CPT | Performed by: FAMILY MEDICINE

## 2022-08-18 ASSESSMENT — ENCOUNTER SYMPTOMS
HEMATURIA: 0
FREQUENCY: 0
MYALGIAS: 1
JOINT SWELLING: 0
EYE PAIN: 0
COUGH: 0
PARESTHESIAS: 0
FEVER: 0
DIZZINESS: 0
ARTHRALGIAS: 1
WEAKNESS: 0
NERVOUS/ANXIOUS: 0
HEMATOCHEZIA: 0
SORE THROAT: 0
SHORTNESS OF BREATH: 0
HEADACHES: 0
HEARTBURN: 0
NAUSEA: 0
CONSTIPATION: 0
DIARRHEA: 0
CHILLS: 0
PALPITATIONS: 0
ABDOMINAL PAIN: 0
DYSURIA: 0

## 2022-08-18 ASSESSMENT — PAIN SCALES - GENERAL: PAINLEVEL: MILD PAIN (3)

## 2022-08-18 NOTE — NURSING NOTE
Prior to immunization administration, verified patients identity using patient s name and date of birth. Please see Immunization Activity for additional information.     Screening Questionnaire for Adult Immunization    Are you sick today?   No   Do you have allergies to medications, food, a vaccine component or latex?   No   Have you ever had a serious reaction after receiving a vaccination?   No   Do you have a long-term health problem with heart, lung, kidney, or metabolic disease (e.g., diabetes), asthma, a blood disorder, no spleen, complement component deficiency, a cochlear implant, or a spinal fluid leak?  Are you on long-term aspirin therapy?   No   Do you have cancer, leukemia, HIV/AIDS, or any other immune system problem?   No   Do you have a parent, brother, or sister with an immune system problem?   No   In the past 3 months, have you taken medications that affect  your immune system, such as prednisone, other steroids, or anticancer drugs; drugs for the treatment of rheumatoid arthritis, Crohn s disease, or psoriasis; or have you had radiation treatments?   No   Have you had a seizure, or a brain or other nervous system problem?   No   During the past year, have you received a transfusion of blood or blood    products, or been given immune (gamma) globulin or antiviral drug?   No   For women: Are you pregnant or is there a chance you could become       pregnant during the next month?   No   Have you received any vaccinations in the past 4 weeks?   No     Immunization questionnaire answers were all negative.        Patient instructed to remain in clinic for 15 minutes afterwards, and to report any adverse reaction to me immediately.       Screening performed by Tamia Stallworth MA on 8/18/2022 at 2:03 PM.

## 2022-08-18 NOTE — PROGRESS NOTES
SUBJECTIVE:   CC: Refugio Davies is an 57 year old male who presents for preventative health visit.  He also has a couple questions to discuss.      Patient has been advised of split billing requirements and indicates understanding: Yes  Healthy Habits:     Getting at least 3 servings of Calcium per day:  Yes    Bi-annual eye exam:  NO    Dental care twice a year:  Yes    Sleep apnea or symptoms of sleep apnea:  None    Diet:  Regular (no restrictions)    Frequency of exercise:  None    Taking medications regularly:  Yes    Medication side effects:  None    PHQ-2 Total Score: 0    Additional concerns today:  Yes          Today's PHQ-2 Score:   PHQ-2 ( 1999 Pfizer) 8/18/2022   Q1: Little interest or pleasure in doing things 0   Q2: Feeling down, depressed or hopeless 0   PHQ-2 Score 0   PHQ-2 Total Score (12-17 Years)- Positive if 3 or more points; Administer PHQ-A if positive -   Q1: Little interest or pleasure in doing things Not at all   Q2: Feeling down, depressed or hopeless Not at all   PHQ-2 Score 0       Abuse: Current or Past(Physical, Sexual or Emotional)- No  Do you feel safe in your environment? Yes    Have you ever done Advance Care Planning? (For example, a Health Directive, POLST, or a discussion with a medical provider or your loved ones about your wishes): No, advance care planning information given to patient to review.  Patient declined advance care planning discussion at this time.    Social History     Tobacco Use     Smoking status: Current Every Day Smoker     Packs/day: 0.50     Types: Cigarettes     Smokeless tobacco: Never Used     Tobacco comment: started age 17, average 1-2 ppd since then   Substance Use Topics     Alcohol use: Yes     Comment: rarely         Alcohol Use 8/18/2022   Prescreen: >3 drinks/day or >7 drinks/week? No   Prescreen: >3 drinks/day or >7 drinks/week? -       Last PSA:   PSA   Date Value Ref Range Status   08/19/2020 0.66 0 - 4 ug/L Final     Comment:     Assay  "Method:  Chemiluminescence using Siemens Vista analyzer     Past medical, family, and social histories, medications, and allergies are reviewed and updated in [facility].     Review of Systems   Constitutional: Negative for chills and fever.   HENT: Negative for congestion, ear pain, hearing loss and sore throat.    Eyes: Negative for pain and visual disturbance.   Respiratory: Negative for cough and shortness of breath.    Cardiovascular: Negative for chest pain, palpitations and peripheral edema.   Gastrointestinal: Negative for abdominal pain, constipation, diarrhea, heartburn, hematochezia and nausea.   Genitourinary: Negative for dysuria, frequency, genital sores, hematuria, impotence, penile discharge and urgency.   Musculoskeletal: Positive for arthralgias and myalgias. Negative for joint swelling.   Skin: Negative for rash.   Neurological: Negative for dizziness, weakness, headaches and paresthesias.   Psychiatric/Behavioral: Negative for mood changes. The patient is not nervous/anxious.          OBJECTIVE:   BP (!) 143/83 (BP Location: Right arm, Patient Position: Sitting, Cuff Size: Adult Large)   Pulse 109   Temp 97.6  F (36.4  C) (Tympanic)   Resp 20   Ht 1.854 m (6' 1\")   Wt 84.9 kg (187 lb 3.2 oz)   SpO2 97%   BMI 24.70 kg/m      Physical Exam  GENERAL: healthy, alert and no distress  EYES: Eyes grossly normal to inspection, PERRL and conjunctivae and sclerae normal  HENT: ear canals and TM's normal, nose and mouth without ulcers or lesions  NECK: no adenopathy, no asymmetry, masses, or scars and thyroid normal to palpation  RESP: lungs clear to auscultation - no rales, rhonchi or wheezes  CV: regular rate and rhythm, normal S1 S2, no S3 or S4, no murmur, click or rub, no peripheral edema and peripheral pulses strong  ABDOMEN: soft, nontender, no hepatosplenomegaly, no masses and bowel sounds normal   (male): normal male genitalia without lesions or urethral discharge, no hernia  MS: no gross " musculoskeletal defects noted, no edema  SKIN: no suspicious lesions or rashes  NEURO: Normal strength and tone, mentation intact and speech normal  PSYCH: mentation appears normal, affect normal/bright      ASSESSMENT/PLAN:   (Z00.00) Routine general medical examination at a health care facility  (primary encounter diagnosis)  Comment:   Plan: Hepatitis C Screen Reflex to HCV RNA Quant and         Genotype, ZOSTER VACCINE RECOMBINANT ADJUVANTED        (SHINGRIX), Lipid panel reflex to direct LDL         Non-fasting, PROSTATE SPEC ANTIGEN SCREEN,         Pneumococcal 20 Valent Conjugate (Prevnar 20),         Fecal colorectal cancer screen FIT - Future         (S+30), Glucose        Return in about 1 year (around 8/18/2023) for full physical.      (M54.41) Right-sided low back pain with right-sided sciatica, unspecified chronicity  Comment: He relates his recurrent symptoms to his remote ORIF of right femur with jacqueline, however the distribution of his symptoms resembles something like an S1 radiculopathy  Plan: Spine  Referral            (Z13.220) Screening for hyperlipidemia  Comment:   Plan: Lipid panel reflex to direct LDL Non-fasting            (Z11.59) Need for hepatitis C screening test  Comment: indications for screening discussed with the patient   Plan: Hepatitis C Screen Reflex to HCV RNA Quant and         Genotype            (Z12.5) Screening for prostate cancer  Comment:   Plan: PROSTATE SPEC ANTIGEN SCREEN            (Z12.11) Screen for colon cancer  Comment:   Plan: Fecal colorectal cancer screen FIT - Future         (S+30)            (Z23) Need for vaccination  Comment: Plan: ZOSTER VACCINE RECOMBINANT ADJUVANTED         (SHINGRIX), Pneumococcal 20 Valent Conjugate         (Prevnar 20)            (Z28.21) Vaccination not carried out because of patient refusal  Comment: COVID-19  Plan:         COUNSELING:   Reviewed preventive health counseling, as reflected in patient instructions  Special  "attention given to:        Immunizations    Vaccinated for: Pneumococcal and Zoster and Declined: COVID-19 due to Conscientious objector               Consider Hep C screening for all patients one time for ages 18-79 years       Colorectal cancer screening       Prostate cancer screening    Estimated body mass index is 24.7 kg/m  as calculated from the following:    Height as of this encounter: 1.854 m (6' 1\").    Weight as of this encounter: 84.9 kg (187 lb 3.2 oz).         He reports that he has been smoking cigarettes. He has been smoking about 0.50 packs per day. He has never used smokeless tobacco.  Nicotine/Tobacco Cessation Plan:   Pharmacotherapies : varenicline (Chantix), bupropion (Zyban) and Nicotine patch  were compared during our discussion. He thinks he will start with nicotine patch but is welcomed to try one of the others.      Counseling Resources:  ATP IV Guidelines  Pooled Cohorts Equation Calculator  FRAX Risk Assessment  ICSI Preventive Guidelines  Dietary Guidelines for Americans, 2010  USDA's MyPlate  ASA Prophylaxis  Lung CA Screening    Vito Maharaj MD  [facility]  "

## 2022-08-19 LAB — HCV AB SERPL QL IA: NONREACTIVE
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
## 2022-08-24 NOTE — TELEPHONE ENCOUNTER
SPINE PATIENTS - NEW PROTOCOL PREVISIT    RECORDS RECEIVED FROM: Internal   REASON FOR VISIT: R sided low back pain   Date of Appt: 09/06/2022   NOTES (FOR ALL VISITS) STATUS DETAILS   OFFICE NOTE from referring provider Internal 08/18/2022 Dr Maharaj [facility]    OFFICE NOTE from other specialist N/A    DISCHARGE SUMMARY from hospital N/A    DISCHARGE REPORT from ER N/A    EMG REPORT N/A    MEDICATION LIST N/A    IMAGING  (FOR ALL VISITS)     MRI (HEAD, NECK, SPINE) N/A    XRAY (SPINE) *NEUROSURGERY* N/A    CT (HEAD, NECK, SPINE) N/A

## 2022-09-02 ENCOUNTER — MEDICAL CORRESPONDENCE (OUTPATIENT)
Dept: HEALTH INFORMATION MANAGEMENT | Facility: CLINIC | Age: 57
End: 2022-09-02

## 2022-09-02 ENCOUNTER — OFFICE VISIT (OUTPATIENT)
Dept: ORTHOPEDICS | Facility: CLINIC | Age: 57
End: 2022-09-02
Attending: ORTHOPAEDIC SURGERY
Payer: OTHER MISCELLANEOUS

## 2022-09-02 VITALS
HEIGHT: 73 IN | SYSTOLIC BLOOD PRESSURE: 142 MMHG | DIASTOLIC BLOOD PRESSURE: 94 MMHG | BODY MASS INDEX: 24.78 KG/M2 | WEIGHT: 187 LBS

## 2022-09-02 DIAGNOSIS — M77.12 LATERAL EPICONDYLITIS OF LEFT ELBOW: Primary | ICD-10-CM

## 2022-09-02 DIAGNOSIS — S59.902D ELBOW INJURY, LEFT, SUBSEQUENT ENCOUNTER: ICD-10-CM

## 2022-09-02 PROCEDURE — 99213 OFFICE O/P EST LOW 20 MIN: CPT | Performed by: FAMILY MEDICINE

## 2022-09-02 NOTE — PROGRESS NOTES
ASSESSMENT & PLAN    Refugio was seen today for pain.    Diagnoses and all orders for this visit:    Lateral epicondylitis of left elbow  -     Orthopedic  Referral  -     US Tenotomy Elbow; Future    Elbow injury, left, subsequent encounter  -     Orthopedic  Referral  -     US Tenotomy Elbow; Future        # Left Lateral Epicondylitis: Longstanding condition with patient with last PRP injection on 4/5/2022 that did not provide lasting relief.  He has gone to occupational therapy as well without improvement of his symptoms.  He still has tenderness to palpation over the lateral epicondyle with some pain with resisted wrist extension.  He also has some pain with end flexion as well. He has seen orthopedic surgery who referred back for possible percutaneous tenotomy. Given he has failed other conservative measures including steroid injection, PRP injection, occupational therapy will proceed with percutaneous tenotomy. Discussed with patient the risks and benefits of the procedure.   Image Findings: none today  Treatment: Activities as tolerated, continue home exercises, plan for percutaneous tenotomy  Job: Per Dr. Valenzuela's note  Medications/Injections: Limited tylenol/ibuprofen for pain for 1-2 weeks, none   Follow-up: 9/12/22      Percutaneous Tenotomy  CPT Code 83818, Percutaneous Tenotomy Lateral Epicondylitis  Must get approved first    -----    SUBJECTIVE:  Refugio Davies is a 57 year old male who is seen in follow-up for left lateral epicondylitis.They were last seen 4/19/2022. In the interim he has been going to hand therapy as well as see orthopedic surgery who referred back for consideration of percutaneous tenotomy of the lateral epicondyle.  The patient is seen with a QRC (Qualified Rehabilitation Consultant).    Since their last visit reports persisting left elbow pain.  They indicate that their current pain level is 3/10. They have tried PRP injection that provided minimal relief, HEP.  "Symptoms still limit his ability to  and fully straighten his elbow. He is noticing tightness and pain over the lateral portion of his elbow.      Patient's past medical, surgical, social, and family histories were reviewed today and no changes are noted.    REVIEW OF SYSTEMS:  Constitutional: NEGATIVE for fever, chills, change in weight  Skin: NEGATIVE for worrisome rashes, moles or lesions  GI/: NEGATIVE for bowel or bladder changes  Neuro: NEGATIVE for weakness, dizziness or paresthesias    OBJECTIVE:  BP (!) 142/94   Ht 1.854 m (6' 1\")   Wt 84.8 kg (187 lb)   BMI 24.67 kg/m     General: healthy, alert and in no distress  HEENT: no scleral icterus or conjunctival erythema  Skin: no suspicious lesions or rash. No jaundice.  CV: regular rhythm by palpation, no pedal edema  Resp: normal respiratory effort without conversational dyspnea   Psych: normal mood and affect  Gait: normal steady gait with appropriate coordination and balance  Neuro: normal light touch sensory exam of the extremities.    MSK:    LEFT ELBOW  Inspection:    No swelling, bruising, discoloration, or obvious deformity or asymmetry  Palpation:    Tender about the lateral epicondyle, common extensor tendon. Remainder of bony, ligamentous and tendinous landmarks are nontender.    Crepitus is Absent  Range of Motion:     Extension full / flexion full / pronation full / supination full  Strength:    Flexion full extension full pronation limited slightly by pain supination limited slightly by pain  Special Tests:    Positive: Pain with resisted wrist extension, pain with resisted middle finger extension, pain with resisted wrist flexion    Negative: cubital tunnel (ulnar Tinel's)      Independent visualization of the below image:  Impression:  Loss of signal-to-noise ratio particularly coronal and sagittal T2 fat  suppressed sequences are likely due to switching coil in middle of  study due to patient's pain.  1. On the background of " tendinosis, moderate-grade intrasubstance tear  of the common extensor tendon at its proximal attachment.     I have personally reviewed the examination and initial interpretation  and I agree with the findings.     RENETTA Miranda MD, [facility]    Disclaimer: This note consists of symbols derived from keyboarding, dictation and/or voice recognition software. As a result, there may be errors in the script that have gone undetected. Please consider this when interpreting information found in this chart.

## 2022-09-02 NOTE — PATIENT INSTRUCTIONS
# Left Lateral Epicondylitis: Longstanding condition with patient with last PRP injection on 4/5/2022 that did not provide lasting relief.  He has gone to occupational therapy as well without improvement of his symptoms.  He still has tenderness to palpation over the lateral epicondyle with some pain with resisted wrist extension.  He also has some pain with end flexion as well. He has seen orthopedic surgery who referred back for possible percutaneous tenotomy. Given he has failed other conservative measures including steroid injection, PRP injection, occupational therapy will proceed with percutaneous tenotomy. Discussed with patient the risks and benefits of the procedure.   Image Findings: none today  Treatment: Activities as tolerated, continue home exercises, plan for percutaneous tenotomy  Job: Per Dr. Valenzuela's note  Medications/Injections: Limited tylenol/ibuprofen for pain for 1-2 weeks, none   Follow-up: 9/12/22    Percutaneous Tenotomy  CPT Code 49902, Percutaneous Tenotomy Lateral Epicondylitis  Must get approved first    It was great seeing you again today!    Panchito Miranda

## 2022-09-02 NOTE — LETTER
9/2/2022         RE: Refugio Davies  [id number]  [place] [address]        Dear Colleague,    Thank you for referring your patient, Refugio Davies, to the [facility] [place]. Please see a copy of my visit note below.    ASSESSMENT & PLAN    Refugio was seen today for pain.    Diagnoses and all orders for this visit:    Lateral epicondylitis of left elbow  -     Orthopedic  Referral  -     US Tenotomy Elbow; Future    Elbow injury, left, subsequent encounter  -     Orthopedic  Referral  -     US Tenotomy Elbow; Future        # Left Lateral Epicondylitis: Longstanding condition with patient with last PRP injection on 4/5/2022 that did not provide lasting relief.  He has gone to occupational therapy as well without improvement of his symptoms.  He still has tenderness to palpation over the lateral epicondyle with some pain with resisted wrist extension.  He also has some pain with end flexion as well. He has seen orthopedic surgery who referred back for possible percutaneous tenotomy. Given he has failed other conservative measures including steroid injection, PRP injection, occupational therapy will proceed with percutaneous tenotomy. Discussed with patient the risks and benefits of the procedure.   Image Findings: none today  Treatment: Activities as tolerated, continue home exercises, plan for percutaneous tenotomy  Job: Per Dr. Valenzuela's note  Medications/Injections: Limited tylenol/ibuprofen for pain for 1-2 weeks, none   Follow-up: 9/12/22      Percutaneous Tenotomy  CPT Code 51180, Percutaneous Tenotomy Lateral Epicondylitis  Must get approved first    -----    SUBJECTIVE:  Refugio Davies is a 57 year old male who is seen in follow-up for left lateral epicondylitis.They were last seen 4/19/2022. In the interim he has been going to hand therapy as well as see orthopedic surgery who referred back for consideration of percutaneous tenotomy of the lateral  "epicondyle.  The patient is seen with a QRC (Qualified Rehabilitation Consultant).    Since their last visit reports persisting left elbow pain.  They indicate that their current pain level is 3/10. They have tried PRP injection that provided minimal relief, HEP. Symptoms still limit his ability to  and fully straighten his elbow. He is noticing tightness and pain over the lateral portion of his elbow.      Patient's past medical, surgical, social, and family histories were reviewed today and no changes are noted.    REVIEW OF SYSTEMS:  Constitutional: NEGATIVE for fever, chills, change in weight  Skin: NEGATIVE for worrisome rashes, moles or lesions  GI/: NEGATIVE for bowel or bladder changes  Neuro: NEGATIVE for weakness, dizziness or paresthesias    OBJECTIVE:  BP (!) 142/94   Ht 1.854 m (6' 1\")   Wt 84.8 kg (187 lb)   BMI 24.67 kg/m     General: healthy, alert and in no distress  HEENT: no scleral icterus or conjunctival erythema  Skin: no suspicious lesions or rash. No jaundice.  CV: regular rhythm by palpation, no pedal edema  Resp: normal respiratory effort without conversational dyspnea   Psych: normal mood and affect  Gait: normal steady gait with appropriate coordination and balance  Neuro: normal light touch sensory exam of the extremities.    MSK:    LEFT ELBOW  Inspection:    No swelling, bruising, discoloration, or obvious deformity or asymmetry  Palpation:    Tender about the lateral epicondyle, common extensor tendon. Remainder of bony, ligamentous and tendinous landmarks are nontender.    Crepitus is Absent  Range of Motion:     Extension full / flexion full / pronation full / supination full  Strength:    Flexion full extension full pronation limited slightly by pain supination limited slightly by pain  Special Tests:    Positive: Pain with resisted wrist extension, pain with resisted middle finger extension, pain with resisted wrist flexion    Negative: cubital tunnel (ulnar " Tinel's)      Independent visualization of the below image:  Impression:  Loss of signal-to-noise ratio particularly coronal and sagittal T2 fat  suppressed sequences are likely due to switching coil in middle of  study due to patient's pain.  1. On the background of tendinosis, moderate-grade intrasubstance tear  of the common extensor tendon at its proximal attachment.     I have personally reviewed the examination and initial interpretation  and I agree with the findings.     RENETTA Miranda MD, [facility]    Disclaimer: This note consists of symbols derived from keyboarding, dictation and/or voice recognition software. As a result, there may be errors in the script that have gone undetected. Please consider this when interpreting information found in this chart.        Again, thank you for allowing me to participate in the care of your patient.        Sincerely,        Panchito Miranda MD

## 2022-09-06 ENCOUNTER — PRE VISIT (OUTPATIENT)
Dept: NEUROSURGERY | Facility: CLINIC | Age: 57
End: 2022-09-06

## 2022-09-06 ENCOUNTER — OFFICE VISIT (OUTPATIENT)
Dept: NEUROSURGERY | Facility: CLINIC | Age: 57
End: 2022-09-06
Payer: COMMERCIAL

## 2022-09-06 VITALS
SYSTOLIC BLOOD PRESSURE: 126 MMHG | HEART RATE: 103 BPM | WEIGHT: 187 LBS | HEIGHT: 73 IN | BODY MASS INDEX: 24.78 KG/M2 | DIASTOLIC BLOOD PRESSURE: 80 MMHG

## 2022-09-06 DIAGNOSIS — M79.18 MYOFASCIAL PAIN: ICD-10-CM

## 2022-09-06 DIAGNOSIS — M21.70 LEG LENGTH DISCREPANCY: ICD-10-CM

## 2022-09-06 DIAGNOSIS — M99.04 SACROILIAC JOINT SOMATIC DYSFUNCTION: Primary | ICD-10-CM

## 2022-09-06 PROCEDURE — 99204 OFFICE O/P NEW MOD 45 MIN: CPT | Mod: 25 | Performed by: PREVENTIVE MEDICINE

## 2022-09-06 PROCEDURE — 98925 OSTEOPATH MANJ 1-2 REGIONS: CPT | Performed by: PREVENTIVE MEDICINE

## 2022-09-06 RX ORDER — IBUPROFEN 200 MG
600 TABLET ORAL EVERY 4 HOURS PRN
COMMUNITY

## 2022-09-06 RX ORDER — ACETAMINOPHEN 500 MG
500-1000 TABLET ORAL EVERY 6 HOURS PRN
COMMUNITY

## 2022-09-06 RX ORDER — AMOXICILLIN 500 MG
1200 CAPSULE ORAL EVERY MORNING
COMMUNITY

## 2022-09-06 RX ORDER — CHOLECALCIFEROL (VITAMIN D3) 50 MCG
2 TABLET ORAL EVERY MORNING
COMMUNITY

## 2022-09-06 ASSESSMENT — PAIN SCALES - GENERAL: PAINLEVEL: MILD PAIN (2)
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
## 2022-09-06 NOTE — PATIENT INSTRUCTIONS
It was nice to meet you.  I have very low suspicion of a pinched nerve.  Your response today suggest that your Pelvic Joint Dysfunction is causing most of your symptoms and I think you will do well with just PT and holding off on further chiropractic care for the time being.  I like to see her back in 2 months to reassess things.  In the meantime, do the self correction I taught you each morning, and start with easy walking and gradually increase over time.  Also get a 1/2 inch heel lift for your right leg.  See the assessment and plan below for further details of what we discussed today.    ASSESSMENT: Refugio Davies is a 57 year old male who presents  today for new patient evaluation of:    Chronic low back pain  Thoracolumbar scoliosis  Right short leg pelvic tilt syndrome associated with his prior right femur ORIF.  Pelvic Joint Dysfunction-nice response to OMT today      PLAN:  I am encouraged by his response today.  I think it is okay to hold on chiropractic care while we start him with pelvic stabilization PT with [facility] and recheck with me in 2 months.  I do recommend a 1/2 inch right heel lift beginning 4 hours a day and increasing his wearing schedule as tolerated gradually.  Do the Pelvic Joint Dysfunction self correction I taught him each morning.  His thoracolumbar scoliosis is likely asymptomatic and present since teenage years.  No evidence of hip disease or bursitis, and no compelling reason to investigate for radiculopathy given his exam.

## 2022-09-06 NOTE — NURSING NOTE
"Reason For Visit:   Chief Complaint   Patient presents with     Consult     Right hip/leg          Occupation: recyclcing  Currently working? No.  Work status?  On medical leave.    Sports: n  Activities: walking             /80   Pulse 103   Ht 1.854 m (6' 1\")   Wt 84.8 kg (187 lb)   BMI 24.67 kg/m        No Known Allergies    Current Outpatient Medications   Medication     acetaminophen (TYLENOL) 500 MG tablet     Ascorbic Acid (VITAMIN C) 500 MG CHEW     ibuprofen (ADVIL/MOTRIN) 200 MG tablet     MULTIPLE VITAMIN PO     NEW MED     Omega-3 Fatty Acids (FISH OIL) 1200 MG capsule     vitamin D3 (CHOLECALCIFEROL) 50 mcg (2000 units) tablet     Current Facility-Administered Medications   Medication     lidocaine 1 % injection 1 mL     triamcinolone (KENALOG-40) injection 40 mg         Darla Severin-Brown, LPN  "

## 2022-09-07 ENCOUNTER — TELEPHONE (OUTPATIENT)
Dept: ORTHOPEDICS | Facility: CLINIC | Age: 57
End: 2022-09-07

## 2022-09-07 NOTE — TELEPHONE ENCOUNTER
[facility] Call Center    Phone Message    May a detailed message be left on voicemail: yes     Reason for Call nAita whom is QRC for Patient need Pre authorization  for Patient for Tenex Unit. Please fax [phone] Attn Anita Roger      Action Taken: Message routed to:  Other: [facility] [place] [facility]    Travel Screening: Not Applicable

## 2022-09-08 ENCOUNTER — TELEPHONE (OUTPATIENT)
Dept: GENERAL RADIOLOGY | Facility: CLINIC | Age: 57
End: 2022-09-08

## 2022-09-08 NOTE — TELEPHONE ENCOUNTER
Patient called with pre-procedure Tenex instructions.  Left voicemail with instructions.      1. Please stop all aspirin, aspirin products and all non-steroidal anti-inflammatories (Motrin, Advil, Naprosyn, Naproxen, Aleve, Indocin, Mobic, Toradol, Voltaren, Arthrotec, Ibuprofen, Diclofenac) 7 days prior to the procedure.   2. If you take fish oil or vitamin E, please stop 5 days prior to the procedure.  3. If you take Coumadin, Lovenox, Warfarin, Pradaxa, Plavix, Prasugrel or any other blood thinner, please discuss this with Dr. Miranda.  These medications will need to be stopped prior to the procedure and requires the permission of the physician prescribing them.  4. For your safety, you should arrange to have someone drive you home after the procedure.  (Only applies to Achilles tendon patients)  5. Infection control:  a. Do not shave within 12 inches of surgery site as it may create microscopic cuts in the skin.  b. Shower the night before and the morning of the procedure.  Using 2 ounces of soap, wash body from the neck to toes, let soap stand for one (1) minute, rinse and repeat one (1) time.  c. Dry off with a clean towel.  d. Don not use lotions, moisturizers, or creams on your skin after either shower.

## 2022-09-08 NOTE — TELEPHONE ENCOUNTER
Called and spoke with Anita at [phone].  She notes the procedure has been authorized.  She is requesting that we send the OV and order to the  as well.  These were faxed to Coty Garrison at [phone].    Rosi Vides, ATC

## 2022-09-12 ENCOUNTER — HOSPITAL ENCOUNTER (OUTPATIENT)
Dept: ULTRASOUND IMAGING | Facility: CLINIC | Age: 57
Discharge: HOME | End: 2022-09-12
Attending: FAMILY MEDICINE | Admitting: FAMILY MEDICINE
Payer: OTHER MISCELLANEOUS

## 2022-09-12 VITALS
DIASTOLIC BLOOD PRESSURE: 81 MMHG | HEART RATE: 89 BPM | RESPIRATION RATE: 17 BRPM | TEMPERATURE: 97.9 F | SYSTOLIC BLOOD PRESSURE: 142 MMHG

## 2022-09-12 DIAGNOSIS — M77.12 LATERAL EPICONDYLITIS OF LEFT ELBOW: ICD-10-CM

## 2022-09-12 DIAGNOSIS — S59.902D ELBOW INJURY, LEFT, SUBSEQUENT ENCOUNTER: ICD-10-CM

## 2022-09-12 PROCEDURE — 24357 REPAIR ELBOW PERC: CPT | Mod: LT | Performed by: FAMILY MEDICINE

## 2022-09-12 PROCEDURE — 76942 ECHO GUIDE FOR BIOPSY: CPT | Mod: 26 | Performed by: FAMILY MEDICINE

## 2022-09-12 PROCEDURE — 24357 REPAIR ELBOW PERC: CPT

## 2022-09-12 RX ORDER — HYDROCODONE BITARTRATE AND ACETAMINOPHEN 5; 325 MG/1; MG/1
1 TABLET ORAL EVERY 6 HOURS PRN
Qty: 10 TABLET | Refills: 0 | Status: SHIPPED | OUTPATIENT
Start: 2022-09-12 | End: 2022-09-15

## 2022-09-27 ENCOUNTER — OFFICE VISIT (OUTPATIENT)
Dept: ORTHOPEDICS | Facility: CLINIC | Age: 57
End: 2022-09-27
Payer: OTHER MISCELLANEOUS

## 2022-09-27 VITALS — HEART RATE: 120 BPM | SYSTOLIC BLOOD PRESSURE: 141 MMHG | DIASTOLIC BLOOD PRESSURE: 86 MMHG

## 2022-09-27 DIAGNOSIS — S59.902D ELBOW INJURY, LEFT, SUBSEQUENT ENCOUNTER: Primary | ICD-10-CM

## 2022-09-27 PROCEDURE — 99213 OFFICE O/P EST LOW 20 MIN: CPT | Performed by: FAMILY MEDICINE

## 2022-09-27 NOTE — PROGRESS NOTES
ASSESSMENT & PLAN    Refugio was seen today for follow up.    Diagnoses and all orders for this visit:    Elbow injury, left, subsequent encounter        # Left Elbow Injury: Initial DOI 1/6/22. Patient is now 2 weeks out from tenotomy procedure of the left common extensor tendon at the left lateral epicondyle. Pain has improved since prior to the procedure. He still is very mild tenderness to palpation over the lateral epicondyle with very mild pain with resisted wrist extension. Given symptoms are improved this early after the procedure I am optimistic that he will have good outcome. Will have them continue to monitor symptoms restart home exercises and follow-up with orthopedic surgery in two weeks. He can follow-up with me as needed.  Image Findings: reviewed tenotomy procedure  Treatment: Activities as tolerated, can restart home exercises  Job: per orthopedic surgery  Medications/Injections: Limited tylenol/ibuprofen for pain for 1-2 weeks, none  Follow-up: Two weeks via MyChart    -----    SUBJECTIVE:  Refugio Davies is a 57 year old male who is seen in follow-up for left elbow. They were last seen 9/12/2022 left elbow tenotomy The patient is seen by themselves.    Since their last visit reports slightly improved left elbow.  Elbow continues to have painful poppin and clicking They indicate that their current pain level is 2/10. They have tried tenotomy procedure 9/12/22.        Patient's past medical, surgical, social, and family histories were reviewed today and no changes are noted.    REVIEW OF SYSTEMS:  Constitutional: NEGATIVE for fever, chills, change in weight  Skin: NEGATIVE for worrisome rashes, moles or lesions  GI/: NEGATIVE for bowel or bladder changes  Neuro: NEGATIVE for weakness, dizziness or paresthesias    OBJECTIVE:  BP (!) 141/86   Pulse 120    General: healthy, alert and in no distress  HEENT: no scleral icterus or conjunctival erythema  Skin: no suspicious lesions or rash. No  jaundice.  CV: regular rhythm by palpation, no pedal edema  Resp: normal respiratory effort without conversational dyspnea   Psych: normal mood and affect  Gait: normal steady gait with appropriate coordination and balance  Neuro: normal light touch sensory exam of the extremities.    MSK:    LEFT ELBOW  Inspection:    No swelling, bruising, discoloration, or obvious deformity or asymmetry  Palpation:    Tender about the lateral epicondyle. Remainder of bony, ligamentous and tendinous landmarks are nontender.    Crepitus is Absent  Range of Motion:     Extension full / flexion full / pronation full / supination full  Strength:    No deficits in flexion, extension, pronation, or supination.  Special Tests:    Positive: mild pain with resisted wrist extension    Negative: pain with resisted middle finger extension, pain with resisted wrist flexion, pain with resisted supination, pain with resisted pronation, cubital tunnel (ulnar Tinel's)      Independent visualization of the below image:       Panchito Miranda MD, [facility]    Disclaimer: This note consists of symbols derived from keyboarding, dictation and/or voice recognition software. As a result, there may be errors in the script that have gone undetected. Please consider this when interpreting information found in this chart.

## 2022-09-27 NOTE — PATIENT INSTRUCTIONS
# Left Elbow Injury: Initial DOI 1/6/22. Patient is now 2 weeks out from tenotomy procedure of the left common extensor tendon at the left lateral epicondyle. Pain has improved since prior to the procedure. He still is very mild tenderness to palpation over the lateral epicondyle with very mild pain with resisted wrist extension. Given symptoms are improved this early after the procedure I am optimistic that he will have good outcome. Will have them continue to monitor symptoms restart home exercises and follow-up with orthopedic surgery in two weeks. He can follow-up with me as needed.  Image Findings: reviewed tenotomy procedure  Treatment: Activities as tolerated, can restart home exercises  Job: per orthopedic surgery  Medications/Injections: Limited tylenol/ibuprofen for pain for 1-2 weeks, none  Follow-up: Two weeks via [employer]    Please call [phone]   Ask for my team if you have any questions or concerns    If you have not yet received the influenza vaccine but would like to get one, please call  [phone] or you can schedule via [employer]    It was great seeing you again today!    Panchito Miranda MD, [facility]

## 2022-09-27 NOTE — LETTER
9/27/2022         RE: Refugio Davies  [address] [address] [place] [place] [address]        Dear Colleague,    Thank you for referring your patient, Refugio Davies, to the [facility] [place]. Please see a copy of my visit note below.    ASSESSMENT & PLAN    Refugio was seen today for follow up.    Diagnoses and all orders for this visit:    Elbow injury, left, subsequent encounter        # Left Elbow Injury: Initial DOI 1/6/22. Patient is now 2 weeks out from tenotomy procedure of the left common extensor tendon at the left lateral epicondyle. Pain has improved since prior to the procedure. He still is very mild tenderness to palpation over the lateral epicondyle with very mild pain with resisted wrist extension. Given symptoms are improved this early after the procedure I am optimistic that he will have good outcome. Will have them continue to monitor symptoms restart home exercises and follow-up with orthopedic surgery in two weeks. He can follow-up with me as needed.  Image Findings: reviewed tenotomy procedure  Treatment: Activities as tolerated, can restart home exercises  Job: per orthopedic surgery  Medications/Injections: Limited tylenol/ibuprofen for pain for 1-2 weeks, none  [facility]    -----    SUBJECTIVE:  Refugio Davies is a 57 year old male who is seen in follow-up for left elbow. They were last seen 9/12/2022 left elbow tenotomy The patient is seen by themselves.    Since their last visit reports slightly improved left elbow.  Elbow continues to have painful poppin and clicking They indicate that their current pain level is 2/10. They have tried tenotomy procedure 9/12/22.        Patient's past medical, surgical, social, and family histories were reviewed today and no changes are noted.    REVIEW OF SYSTEMS:  Constitutional: NEGATIVE for fever, chills, change in weight  Skin: NEGATIVE for worrisome rashes, moles or lesions  GI/: NEGATIVE for bowel  or bladder changes  Neuro: NEGATIVE for weakness, dizziness or paresthesias    OBJECTIVE:  BP (!) 141/86   Pulse 120    General: healthy, alert and in no distress  HEENT: no scleral icterus or conjunctival erythema  Skin: no suspicious lesions or rash. No jaundice.  CV: regular rhythm by palpation, no pedal edema  Resp: normal respiratory effort without conversational dyspnea   Psych: normal mood and affect  Gait: normal steady gait with appropriate coordination and balance  Neuro: normal light touch sensory exam of the extremities.    MSK:    LEFT ELBOW  Inspection:    No swelling, bruising, discoloration, or obvious deformity or asymmetry  Palpation:    Tender about the lateral epicondyle. Remainder of bony, ligamentous and tendinous landmarks are nontender.    Crepitus is Absent  Range of Motion:     Extension full / flexion full / pronation full / supination full  Strength:    No deficits in flexion, extension, pronation, or supination.  Special Tests:    Positive: mild pain with resisted wrist extension    Negative: pain with resisted middle finger extension, pain with resisted wrist flexion, pain with resisted supination, pain with resisted pronation, cubital tunnel (ulnar Tinel's)      Independent visualization of the below image:       Panchito Miranda MD, [facility]    Disclaimer: This note consists of symbols derived from keyboarding, dictation and/or voice recognition software. As a result, there may be errors in the script that have gone undetected. Please consider this when interpreting information found in this chart.          Again, thank you for allowing me to participate in the care of your patient.        Sincerely,        Panchito Miranda MD

## 2022-10-10 NOTE — PROGRESS NOTES
"Chief Complaint:   Chief Complaint   Patient presents with     Left Elbow - Pain     Left elbow pain. Had TENEX procedure with Dr. Miranda on 9/12/22. He really doesn't feel any better than prior to the procedure. Still has pain and gets swollen.      DATE of INJURY: 1/6/2022      HPI: Refugio Davies is a 57 year old male , right -hand dominant, who presents for followup evaluation and management of a left elbow and forearm injury, now over 9 months out. Had left elbow TENEX tenotomy procedure with sports medicine on 9/12/2022. He doesn't think its helped at all. Still gets intermittent pain in the elbow and down the forearm \"out of the blue\" at rest, watching TV, etc. Lasts for minutes then goes away. Still some numbness and tingling into the ring/small fingers at times, not constant.    Overall he states things arent \"that bad\", just gets those intermittent twinges of pain.    Recall he was doing well, but had a flare with return to work on 6/6/2022, so we then cut him back down again with work. He then numbness and tingling into his ring and small fingers and the thumb, intermittent stabbing pain into the forearm. The pain with just sitting or when reaching for a pillow.    He had a PRP injection 4/5/2022 with Dr Miranda. Seemed to have helped some initially.     He also had cortisone injection 7/2022, helped for about 5 days..    INJURY:  1/6/2022 while at work, pulling a garbage can out of the snow and felt a pop and pain down the forearm to the hand. Worked through the day with the pain. Presented to the ED that night with xrays of the forearm and wrist negative.          He reports having moderate  pain/discomfort around the injury site. Intermittent numbness and tingling into the ring/small fingers, thumb.  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   Patient Active Problem List   Diagnosis     CARDIOVASCULAR SCREENING; LDL GOAL LESS THAN 160     Tobacco use disorder     Other idiopathic scoliosis, lumbar region     History of pneumothorax       Past surgical history:  has a past surgical history that includes Open reduction internal fixation rodding intramedullary femur (Right, 1997) and Lung surgery (Right, 2009).     Medications:   Current Outpatient Medications:      acetaminophen (TYLENOL) 500 MG tablet, Take 500-1,000 mg by mouth every 6 hours as needed for mild pain, Disp: , Rfl:      Ascorbic Acid (VITAMIN C) 500 MG CHEW, Take 2 tablets by mouth daily, Disp: , Rfl:      ibuprofen (ADVIL/MOTRIN) 200 MG tablet, Take 600 mg by mouth every 4 hours as needed for mild pain, Disp: , Rfl:      MULTIPLE VITAMIN PO, Take by mouth daily, Disp: , Rfl:      NEW MED, Vitapulse CoQ10, PPQ, Disp: , Rfl:      Omega-3 Fatty Acids (FISH OIL) 1200 MG capsule, Take 1,200 mg by mouth daily, Disp: , Rfl:      vitamin D3 (CHOLECALCIFEROL) 50 mcg (2000 units) tablet, Take 2 tablets by mouth daily, Disp: , Rfl:     Current Facility-Administered Medications:      lidocaine 1 % injection 1 mL, 1 mL, , , Jose Daniel Valenzuela MD, 1 mL at 07/20/22 1500     triamcinolone (KENALOG-40) injection 40 mg, 40 mg, , , Jose Daniel Valenzuela MD, 40 mg at 07/20/22 1500    Allergies:   No Known Allergies     Family History: family history includes Breast Cancer in his mother; Diabetes in his maternal aunt; Thyroid Disease in his brother.     Social History:  reports that he has been smoking cigarettes. He has been smoking an average of .5 packs per day. He has never used smokeless tobacco. He reports current alcohol use. He reports that he does not use drugs.    Review of Systems:     Denies numbness, tingling,  "parasthesias.   Denies headaches.   Denies fevers, chills, night sweats   Denies chest pain.   Denies shortness of breath.   Denies any skin problems, abrasions, rashes, irritation.      Physical Exam  GENERAL APPEARANCE: healthy, alert, no distress. Accompanied by QRC  SKIN: no suspicious lesions or rashes  RESPIRATORY: No increased work of breathing.  NEURO: Normal strength and tone, mentation intact and speech normal  PSYCH:  mentation appears normal and affect normal. Not anxious.    Ht 1.854 m (6' 1\")   Wt 87.1 kg (192 lb)   BMI 25.33 kg/m         LEFT ELBOW:  Skin intact. No open wounds. No skin maceration.  Inspection: no swelling, no ecchymosis, no olecranon bursa swelling, no distal bicep tendon defect  Tender: mild - moderate tender to palpation at lateral epicondyle, common extensor tendon    Non- tender to palpation medial epicondyle, flexor pronator mass.  Non-tender: supracondylar notch, olecranon bursa and distal bicep tendon  Range of Motion: all normal, antecubital discomfort with full flexion.  Strength: elbow strength full  Slight discomfort with resisted wrist extension  No discomfort today with resisted wrist flexion, index/long finger extension.      [facility], 7/15/2022: Essentially normal study of the left upper extremity. Given the clinical symptoms, there is no compression of the ulnar nerve at the elbow. There is no electrophysiologic evidence of radiculopathy, plexopathy, neuropathy or myopathy.      X-rays: no new images today.  3 views left wrist, 2 views left forearm, [facility], from 1/6/2022 -- No obvious fractures or dislocations..    MRI left elbow 1/19/2022:  On the background of tendinosis, moderate-grade intrasubstance tear of the common extensor tendon at its proximal attachment.        Assessment: 57 year old male , right -hand dominant, with:  1)  acute left elbow pain, strain following work injury, underlying lateral epicondylitis with partial " "tearing.  2) acute ulnar neuropathy          Plan:   * unclear as to why continues to have these intermittent pains, or \"flares\" he calls them  * at this time, i'd like to refer him to an elbow specialist for an evaluation, as things don't seem to be improving, given numerous treatments.    * recommend lifting with palm up, supination, as this will take some stress off the extensors.  * continue light exercises for the elbow, forearm. Nothing heavy or strenuous.    * counterforce elbow brace/strap, taping as needed.  * wrist brace as needed.  * elbow massage and icing  * shoulder range of motion, gentle  * gentle neck range of motion.  * NDAIDS as needed.  * will also refer to acupuncture         * return to clinic 6 weeks for clinical recheck.    * workability note. Return to work with restrictions, no use of left upper extremity.        Jose Daniel Valenzuela M.D., M.S.  Dept. of Orthopaedic Surgery  [facility]    "

## 2022-10-12 ENCOUNTER — OFFICE VISIT (OUTPATIENT)
Dept: ORTHOPEDICS | Facility: CLINIC | Age: 57
End: 2022-10-12
Payer: OTHER MISCELLANEOUS

## 2022-10-12 VITALS — WEIGHT: 192 LBS | HEIGHT: 73 IN | BODY MASS INDEX: 25.45 KG/M2

## 2022-10-12 DIAGNOSIS — S59.902D ELBOW INJURY, LEFT, SUBSEQUENT ENCOUNTER: Primary | ICD-10-CM

## 2022-10-12 PROCEDURE — 99213 OFFICE O/P EST LOW 20 MIN: CPT | Performed by: ORTHOPAEDIC SURGERY

## 2022-10-12 ASSESSMENT — PAIN SCALES - GENERAL: PAINLEVEL: MODERATE PAIN (4)

## 2022-10-12 NOTE — LETTER
October 12, 2022      Refugio Davies  [address]  [place] [address]        To Whom It May Concern,     Refugio Davies attended clinic here on Oct 12, 2022 and he can return to work with restrictions: no use of left upper extremity - strict. Sedentary duties only.     Restrictions in place for 6 weeks.    If you have questions or concerns, please call the clinic at the number listed above.    Sincerely,         Philipp Odell PA-C, CAQ-OS  Dept. of Orthopedic Surgery  [facility]

## 2022-10-12 NOTE — LETTER
"    10/12/2022         RE: Refugio Davies  [address] [place] [address]        Dear Colleague,    Thank you for referring your patient, Refugio Davies, to the [facility] [place]. Please see a copy of my visit note below.    Chief Complaint:   Chief Complaint   Patient presents with     Left Elbow - Pain     Left elbow pain. Had TENEX procedure with Dr. Miranda on 9/12/22. He really doesn't feel any better than prior to the procedure. Still has pain and gets swollen.      DATE of INJURY: 1/6/2022      HPI: Refugio Davies is a 57 year old male , right -hand dominant, who presents for followup evaluation and management of a left elbow and forearm injury, now over 9 months out. Had left elbow TENEX tenotomy procedure with sports medicine on 9/12/2022. He doesn't think its helped at all. Still gets intermittent pain in the elbow and down the forearm \"out of the blue\" at rest, watching TV, etc. Lasts for minutes then goes away. Still some numbness and tingling into the ring/small fingers at times, not constant.    Overall he states things arent \"that bad\", just gets those intermittent twinges of pain.    Recall he was doing well, but had a flare with return to work on 6/6/2022, so we then cut him back down again with work. He then numbness and tingling into his ring and small fingers and the thumb, intermittent stabbing pain into the forearm. The pain with just sitting or when reaching for a pillow.    He had a PRP injection 4/5/2022 with Dr Miranda. Seemed to have helped some initially.     He also had cortisone injection 7/2022, helped for about 5 days..    INJURY:  1/6/2022 while at work, pulling a garbage can out of the snow and felt a pop and pain down the forearm to the hand. Worked through the day with the pain. Presented to the ED that night with xrays of the forearm and wrist negative.          He reports having moderate  pain/discomfort around the injury site. Intermittent " numbness and tingling into the ring/small fingers, thumb.  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   Patient Active Problem List   Diagnosis     CARDIOVASCULAR SCREENING; LDL GOAL LESS THAN 160     Tobacco use disorder     Other idiopathic scoliosis, lumbar region     History of pneumothorax       Past surgical history:  has a past surgical history that includes Open reduction internal fixation rodding intramedullary femur (Right, 1997) and Lung surgery (Right, 2009).     Medications:   Current Outpatient Medications:      acetaminophen (TYLENOL) 500 MG tablet, Take 500-1,000 mg by mouth every 6 hours as needed for mild pain, Disp: , Rfl:      Ascorbic Acid (VITAMIN C) 500 MG CHEW, Take 2 tablets by mouth daily, Disp: , Rfl:      ibuprofen (ADVIL/MOTRIN) 200 MG tablet, Take 600 mg by mouth every 4 hours as needed for mild pain, Disp: , Rfl:      MULTIPLE VITAMIN PO, Take by mouth daily, Disp: , Rfl:      NEW MED, Vitapulse CoQ10, PPQ, Disp: , Rfl:      Omega-3 Fatty Acids (FISH OIL) 1200 MG capsule, Take 1,200 mg by mouth daily, Disp: , Rfl:      vitamin D3 (CHOLECALCIFEROL) 50 mcg (2000 units) tablet, Take 2 tablets by mouth daily, Disp: , Rfl:     Current Facility-Administered Medications:      lidocaine 1 % injection 1 mL, 1 mL, , , Jose Daniel Valenzuela MD, 1 mL at 07/20/22 1500     triamcinolone (KENALOG-40) injection 40 mg, 40 mg, , , Jose Daniel Valenzuela MD, 40 mg at 07/20/22 1500    Allergies:   No Known Allergies     Family History: family history includes Breast Cancer in his mother; Diabetes in his maternal aunt; Thyroid Disease in his brother.     Social History:   "reports that he has been smoking cigarettes. He has been smoking an average of .5 packs per day. He has never used smokeless tobacco. He reports current alcohol use. He reports that he does not use drugs.    Review of Systems:     Denies numbness, tingling, parasthesias.   Denies headaches.   Denies fevers, chills, night sweats   Denies chest pain.   Denies shortness of breath.   Denies any skin problems, abrasions, rashes, irritation.      Physical Exam  GENERAL APPEARANCE: healthy, alert, no distress. Accompanied by QRC  SKIN: no suspicious lesions or rashes  RESPIRATORY: No increased work of breathing.  NEURO: Normal strength and tone, mentation intact and speech normal  PSYCH:  mentation appears normal and affect normal. Not anxious.    Ht 1.854 m (6' 1\")   Wt 87.1 kg (192 lb)   BMI 25.33 kg/m         LEFT ELBOW:  Skin intact. No open wounds. No skin maceration.  Inspection: no swelling, no ecchymosis, no olecranon bursa swelling, no distal bicep tendon defect  Tender: mild - moderate tender to palpation at lateral epicondyle, common extensor tendon    Non- tender to palpation medial epicondyle, flexor pronator mass.  Non-tender: supracondylar notch, olecranon bursa and distal bicep tendon  Range of Motion: all normal, antecubital discomfort with full flexion.  Strength: elbow strength full  Slight discomfort with resisted wrist extension  No discomfort today with resisted wrist flexion, index/long finger extension.      [facility], 7/15/2022: Essentially normal study of the left upper extremity. Given the clinical symptoms, there is no compression of the ulnar nerve at the elbow. There is no electrophysiologic evidence of radiculopathy, plexopathy, neuropathy or myopathy.      X-rays: no new images today.  3 views left wrist, 2 views left forearm, [facility], from 1/6/2022 -- No obvious fractures or dislocations..    MRI left elbow 1/19/2022:  On the background of tendinosis, " "moderate-grade intrasubstance tear of the common extensor tendon at its proximal attachment.        Assessment: 57 year old male , right -hand dominant, with:  1)  acute left elbow pain, strain following work injury, underlying lateral epicondylitis with partial tearing.  2) acute ulnar neuropathy          Plan:   * unclear as to why continues to have these intermittent pains, or \"flares\" he calls them  * at this time, i'd like to refer him to an elbow specialist for an evaluation, as things don't seem to be improving, given numerous treatments.    * recommend lifting with palm up, supination, as this will take some stress off the extensors.  * continue light exercises for the elbow, forearm. Nothing heavy or strenuous.    * counterforce elbow brace/strap, taping as needed.  * wrist brace as needed.  * elbow massage and icing  * shoulder range of motion, gentle  * gentle neck range of motion.  * NDAIDS as needed.  * will also refer to acupuncture         * return to clinic 6 weeks for clinical recheck.    * workability note. Return to work with restrictions, no use of left upper extremity.        Jose Daniel Valenzuela M.D., M.S.  Dept. of Orthopaedic Surgery  [facility]        Again, thank you for allowing me to participate in the care of your patient.        Sincerely,        Jose Daniel Valenzuela MD    "

## 2022-10-14 NOTE — TELEPHONE ENCOUNTER
DIAGNOSIS: Elbow injury, left / Link, OUSMANE Valdes / no image   APPOINTMENT DATE: 10.20.22   NOTES STATUS DETAILS   OFFICE NOTE from other specialist Internal 10.12.22 Dr Jose Daniel Valenzuela, [facility]  8.17.22 7.20.22 9.27.22 Dr Panchito Miranda, [facility]  9.2.22  More in Epic   DISCHARGE REPORT from the ER Internal 1.6.22 [place] ED   MEDICATION LIST Internal    LABS     CBC/DIFF Internal    MRI Internal 1.19.22 L elbow   XRAYS (IMAGES & REPORTS) PACS 1.6.22 L wrist, L forearm, [facility]     Action 10.14.22 2:39 PM [facility]   Action Taken Faxed request to [facility] for images [phone]
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
## 2022-10-20 ENCOUNTER — OFFICE VISIT (OUTPATIENT)
Dept: ORTHOPEDICS | Facility: CLINIC | Age: 57
End: 2022-10-20
Attending: PHYSICIAN ASSISTANT
Payer: OTHER MISCELLANEOUS

## 2022-10-20 ENCOUNTER — PRE VISIT (OUTPATIENT)
Dept: ORTHOPEDICS | Facility: CLINIC | Age: 57
End: 2022-10-20

## 2022-10-20 VITALS — HEIGHT: 73 IN | WEIGHT: 192.02 LBS | BODY MASS INDEX: 25.45 KG/M2

## 2022-10-20 DIAGNOSIS — S59.902D ELBOW INJURY, LEFT, SUBSEQUENT ENCOUNTER: ICD-10-CM

## 2022-10-20 DIAGNOSIS — M77.12 LATERAL EPICONDYLITIS OF LEFT ELBOW: Primary | ICD-10-CM

## 2022-10-20 PROCEDURE — 99204 OFFICE O/P NEW MOD 45 MIN: CPT | Performed by: ORTHOPAEDIC SURGERY
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
## 2022-10-30 ENCOUNTER — HEALTH MAINTENANCE LETTER (OUTPATIENT)
Age: 57
End: 2022-10-30

## 2022-11-21 NOTE — PROGRESS NOTES
"Chief Complaint:   Chief Complaint   Patient presents with     Left Elbow - Pain     W/C. Tenex procedure on 9/12/22 with Dr. Miranda. Saw Dr. Jacob on 10/20/22. Patient notes his elbow is slowly getting better after the Tenex procedure. He is not at work. He is doing little exercises at home.       DATE of INJURY: 1/6/2022      HPI: Refugio Davies is a 57 year old male , right -hand dominant, who presents for followup evaluation and management of a left elbow and forearm injury, now over 10 months out. Returns today doing about the same, maybe slowly improved. The intermittent numbness and tingling seems to be less. The intermittent \"twinges\" of shooting pain seem to be less intense but still happen and are painful. Still has some \"popping\" which is painful.    He had a consult with an upper extremity elbow specialist at the [facility] (Dr Jacob) 10/20/2022, whom recommended continued nonsurgical management at this time until at least 1 year nonop management.    Hasn't been able to get in for acupuncture yet, sounds like appointment in December was earliest he could get in.    Refugio had left elbow TENEX tenotomy procedure with sports medicine on 9/12/2022. He doesn't think its helped at all. Still gets intermittent pain in the elbow and down the forearm \"out of the blue\" at rest, watching TV, etc. Lasts for minutes then goes away, but overall less intense than previous. Still some numbness and tingling into the ring/small fingers at times, not constant, less frequent than previously.    Recall he was doing well, but had a flare with return to work on 6/6/2022, so we then cut him back down again with work. He then numbness and tingling into his ring and small fingers and the thumb, intermittent stabbing pain into the forearm. The pain with just sitting or when reaching for a pillow.    He had a PRP injection 4/5/2022 with Dr Miranda. Seemed to have helped some initially.     He also had cortisone " injection 7/2022, helped for about 5 days..    INJURY:  1/6/2022 while at work, pulling a garbage can out of the snow and felt a pop and pain down the forearm to the hand. Worked through the day with the pain. Presented to the ED that night with xrays of the forearm and wrist negative.          He reports having moderate  pain/discomfort around the injury site. Intermittent numbness and tingling into the ring/small fingers, thumb.  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   Patient Active Problem List   Diagnosis     CARDIOVASCULAR SCREENING; LDL GOAL LESS THAN 160     Tobacco use disorder     Other idiopathic scoliosis, lumbar region     History of pneumothorax       Past surgical history:  has a past surgical history that includes Open reduction internal fixation rodding intramedullary femur (Right, 1997) and Lung surgery (Right, 2009).     Medications:   Current Outpatient Medications:      acetaminophen (TYLENOL) 500 MG tablet, Take 500-1,000 mg by mouth every 6 hours as needed for mild pain, Disp: , Rfl:      Ascorbic Acid (VITAMIN C) 500 MG CHEW, Take 2 tablets by mouth daily, Disp: , Rfl:      ibuprofen (ADVIL/MOTRIN) 200 MG tablet, Take 600 mg by mouth every 4 hours as needed for mild pain, Disp: , Rfl:      MULTIPLE VITAMIN PO, Take by mouth daily, Disp: , Rfl:      NEW MED, Vitapulse CoQ10, PPQ, Disp: , Rfl:      Omega-3 Fatty Acids (FISH OIL) 1200 MG capsule, Take 1,200 mg by mouth daily, Disp: , Rfl:      vitamin D3 (CHOLECALCIFEROL) 50 mcg (2000 units) tablet, Take 2 tablets by mouth daily, Disp: , Rfl:     Current Facility-Administered Medications:      lidocaine  "1 % injection 1 mL, 1 mL, , , Jose Daniel Valenzuela MD, 1 mL at 07/20/22 1500     triamcinolone (KENALOG-40) injection 40 mg, 40 mg, , , Jose Daniel Valenzuela MD, 40 mg at 07/20/22 1500    Allergies:   No Known Allergies     Family History: family history includes Breast Cancer in his mother; Diabetes in his maternal aunt; Thyroid Disease in his brother.     Social History:  reports that he has been smoking cigarettes. He has been smoking an average of .5 packs per day. He has never used smokeless tobacco. He reports current alcohol use. He reports that he does not use drugs.    Review of Systems:     Denies numbness, tingling, parasthesias.   Denies headaches.   Denies fevers, chills, night sweats   Denies chest pain.   Denies shortness of breath.   Denies any skin problems, abrasions, rashes, irritation.      Physical Exam  GENERAL APPEARANCE: healthy, alert, no distress. Accompanied by QRC  SKIN: no suspicious lesions or rashes  RESPIRATORY: No increased work of breathing.  NEURO: Normal strength and tone, mentation intact and speech normal  PSYCH:  mentation appears normal and affect normal. Not anxious.    Ht 1.854 m (6' 1\")   Wt 88.9 kg (195 lb 14.4 oz)   BMI 25.85 kg/m         LEFT ELBOW:  Skin intact. No open wounds. No skin maceration.  Inspection: no swelling, no ecchymosis, no olecranon bursa swelling, no distal bicep tendon defect  Tender: moderate tender to palpation at lateral epicondyle, common extensor tendon    mild- tender to palpation medial epicondyle, flexor pronator mass.  Non-tender: supracondylar notch, olecranon bursa and distal bicep tendon  Range of Motion: all normal   Strength: elbow strength full  Mild discomfort with resisted wrist extension, long finger extension.  No discomfort today with resisted wrist flexion, index finger extension.      [facility], 7/15/2022: Essentially normal study of the left upper extremity. Given the clinical symptoms, there is no " "compression of the ulnar nerve at the elbow. There is no electrophysiologic evidence of radiculopathy, plexopathy, neuropathy or myopathy.      X-rays: no new images today.  3 views left wrist, 2 views left forearm, [facility], from 1/6/2022 -- No obvious fractures or dislocations..    MRI left elbow 1/19/2022:  On the background of tendinosis, moderate-grade intrasubstance tear of the common extensor tendon at its proximal attachment.        Assessment: 57 year old male , right -hand dominant, with:  1)  acute left elbow pain, strain following work injury, underlying lateral epicondylitis with partial tearing.  2) acute ulnar neuropathy          Plan:   * again, unclear as to why continues to have these intermittent pains, or \"flares\" he calls them  * does sound like maybe slow, slight improvements since last time.  * I understand and respect his frustrations without significant improvement from onset.  * will continue the course and see where things go. Likely back to Dr Jacob sometime after the new year for reassessment, possible surgical discussion.    * recommend lifting with palm up, supination, as this will take some stress off the extensors.  * continue light exercises for the elbow, forearm. Nothing heavy or strenuous.    * counterforce elbow brace/strap, taping as needed.  * wrist brace as needed.  * elbow massage and icing  * shoulder range of motion, gentle  * gentle neck range of motion.  * NDAIDS as needed.  *  acupuncture when able.        * return to clinic 6 weeks for clinical recheck.    * workability note. Return to work with restrictions, no use of left upper extremity.    * All questions were addressed and answered prior to discharge from clinic today. The patient acknowledges an understanding of and agreement with the plan set forth during today's visit. Patient was advised to call our office or MyChart us if any further questions arise upon leaving our office today.        Jose Daniel Valenzuela, " M.D., M.S.  Dept. of Orthopaedic Surgery  [facility]

## 2022-11-23 ENCOUNTER — OFFICE VISIT (OUTPATIENT)
Dept: ORTHOPEDICS | Facility: CLINIC | Age: 57
End: 2022-11-23
Payer: OTHER MISCELLANEOUS

## 2022-11-23 VITALS
HEART RATE: 116 BPM | HEIGHT: 73 IN | DIASTOLIC BLOOD PRESSURE: 93 MMHG | WEIGHT: 195.9 LBS | BODY MASS INDEX: 25.96 KG/M2 | SYSTOLIC BLOOD PRESSURE: 141 MMHG

## 2022-11-23 DIAGNOSIS — S59.902D ELBOW INJURY, LEFT, SUBSEQUENT ENCOUNTER: Primary | ICD-10-CM

## 2022-11-23 DIAGNOSIS — M77.12 LATERAL EPICONDYLITIS, LEFT ELBOW: ICD-10-CM

## 2022-11-23 PROCEDURE — 99213 OFFICE O/P EST LOW 20 MIN: CPT | Performed by: ORTHOPAEDIC SURGERY

## 2022-11-23 ASSESSMENT — PAIN SCALES - GENERAL: PAINLEVEL: MILD PAIN (3)

## 2022-11-23 NOTE — LETTER
November 23, 2022      RE: Refugio Davies  [address]  [place] [address]        To whom it may concern:     Refugio Davies is under my care for left elbow pain.     Work related injury: Yes       Date of injury: 1/6/22          Return to work date: 11/23/22     ** WITH RESTRICTIONS? Yes, with work restrictions: * Other: no use of left upper extremity - strict. Sedentary duties only.  DURATION OF LIMITATIONS: 6 weeks        Next appointment: 6 weeks        Electronically Signed (as below)  Dr. Jose Daniel Valenzuela M.D.

## 2022-11-23 NOTE — LETTER
"    11/23/2022         RE: Refugio Davies  [id number]  [facility] [address]        Dear Colleague,    Thank you for referring your patient, Refugio Davies, to the [facility] [place]. Please see a copy of my visit note below.    Chief Complaint:   Chief Complaint   Patient presents with     Left Elbow - Pain     W/C. Tenex procedure on 9/12/22 with Dr. Miranda. Saw Dr. Jacob on 10/20/22. Patient notes his elbow is slowly getting better after the Tenex procedure. He is not at work. He is doing little exercises at home.       DATE of INJURY: 1/6/2022      HPI: Refugio Davies is a 57 year old male , right -hand dominant, who presents for followup evaluation and management of a left elbow and forearm injury, now over 10 months out. Returns today doing about the same, maybe slowly improved. The intermittent numbness and tingling seems to be less. The intermittent \"twinges\" of shooting pain seem to be less intense but still happen and are painful. Still has some \"popping\" which is painful.    He had a consult with an upper extremity elbow specialist at the [facility] (Dr Jacob) 10/20/2022, whom recommended continued nonsurgical management at this time until at least 1 year nonop management.    Hasn't been able to get in for acupuncture yet, sounds like appointment in December was earliest he could get in.    Refugio had left elbow TENEX tenotomy procedure with sports medicine on 9/12/2022. He doesn't think its helped at all. Still gets intermittent pain in the elbow and down the forearm \"out of the blue\" at rest, watching TV, etc. Lasts for minutes then goes away, but overall less intense than previous. Still some numbness and tingling into the ring/small fingers at times, not constant, less frequent than previously.    Recall he was doing well, but had a flare with return to work on 6/6/2022, so we then cut him back down again with work. He then numbness and tingling into his " ring and small fingers and the thumb, intermittent stabbing pain into the forearm. The pain with just sitting or when reaching for a pillow.    He had a PRP injection 4/5/2022 with Dr Miranda. Seemed to have helped some initially.     He also had cortisone injection 7/2022, helped for about 5 days..    INJURY:  1/6/2022 while at work, pulling a garbage can out of the snow and felt a pop and pain down the forearm to the hand. Worked through the day with the pain. Presented to the ED that night with xrays of the forearm and wrist negative.          He reports having moderate  pain/discomfort around the injury site. Intermittent numbness and tingling into the ring/small fingers, thumb.  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   Patient Active Problem List   Diagnosis     CARDIOVASCULAR SCREENING; LDL GOAL LESS THAN 160     Tobacco use disorder     Other idiopathic scoliosis, lumbar region     History of pneumothorax       Past surgical history:  has a past surgical history that includes Open reduction internal fixation rodding intramedullary femur (Right, 1997) and Lung surgery (Right, 2009).     Medications:   Current Outpatient Medications:      acetaminophen (TYLENOL) 500 MG tablet, Take 500-1,000 mg by mouth every 6 hours as needed for mild pain, Disp: , Rfl:      Ascorbic Acid (VITAMIN C) 500 MG CHEW, Take 2 tablets by mouth daily, Disp: , Rfl:      ibuprofen (ADVIL/MOTRIN) 200 MG tablet, Take 600 mg by mouth every 4 hours as needed for mild pain, Disp: , Rfl:      MULTIPLE VITAMIN PO, Take by mouth daily, Disp: , Rfl:      NEW MED, Vitapulse CoQ10, PPQ, Disp: , Rfl:       "Omega-3 Fatty Acids (FISH OIL) 1200 MG capsule, Take 1,200 mg by mouth daily, Disp: , Rfl:      vitamin D3 (CHOLECALCIFEROL) 50 mcg (2000 units) tablet, Take 2 tablets by mouth daily, Disp: , Rfl:     Current Facility-Administered Medications:      lidocaine 1 % injection 1 mL, 1 mL, , , Jose Daniel Valenzuela MD, 1 mL at 07/20/22 1500     triamcinolone (KENALOG-40) injection 40 mg, 40 mg, , , Jose Daniel Valenzuela MD, 40 mg at 07/20/22 1500    Allergies:   No Known Allergies     Family History: family history includes Breast Cancer in his mother; Diabetes in his maternal aunt; Thyroid Disease in his brother.     Social History:  reports that he has been smoking cigarettes. He has been smoking an average of .5 packs per day. He has never used smokeless tobacco. He reports current alcohol use. He reports that he does not use drugs.    Review of Systems:     Denies numbness, tingling, parasthesias.   Denies headaches.   Denies fevers, chills, night sweats   Denies chest pain.   Denies shortness of breath.   Denies any skin problems, abrasions, rashes, irritation.      Physical Exam  GENERAL APPEARANCE: healthy, alert, no distress. Accompanied by QRC  SKIN: no suspicious lesions or rashes  RESPIRATORY: No increased work of breathing.  NEURO: Normal strength and tone, mentation intact and speech normal  PSYCH:  mentation appears normal and affect normal. Not anxious.    Ht 1.854 m (6' 1\")   Wt 88.9 kg (195 lb 14.4 oz)   BMI 25.85 kg/m         LEFT ELBOW:  Skin intact. No open wounds. No skin maceration.  Inspection: no swelling, no ecchymosis, no olecranon bursa swelling, no distal bicep tendon defect  Tender: moderate tender to palpation at lateral epicondyle, common extensor tendon    mild- tender to palpation medial epicondyle, flexor pronator mass.  Non-tender: supracondylar notch, olecranon bursa and distal bicep tendon  Range of Motion: all normal   Strength: elbow strength full  Mild discomfort with resisted wrist " "extension, long finger extension.  No discomfort today with resisted wrist flexion, index finger extension.      [facility], 7/15/2022: Essentially normal study of the left upper extremity. Given the clinical symptoms, there is no compression of the ulnar nerve at the elbow. There is no electrophysiologic evidence of radiculopathy, plexopathy, neuropathy or myopathy.      X-rays: no new images today.  3 views left wrist, 2 views left forearm, [facility], from 1/6/2022 -- No obvious fractures or dislocations..    MRI left elbow 1/19/2022:  On the background of tendinosis, moderate-grade intrasubstance tear of the common extensor tendon at its proximal attachment.        Assessment: 57 year old male , right -hand dominant, with:  1)  acute left elbow pain, strain following work injury, underlying lateral epicondylitis with partial tearing.  2) acute ulnar neuropathy          Plan:   * again, unclear as to why continues to have these intermittent pains, or \"flares\" he calls them  * does sound like maybe slow, slight improvements since last time.  * I understand and respect his frustrations without significant improvement from onset.  * will continue the course and see where things go. Likely back to Dr Jacob sometime after the new year for reassessment, possible surgical discussion.    * recommend lifting with palm up, supination, as this will take some stress off the extensors.  * continue light exercises for the elbow, forearm. Nothing heavy or strenuous.    * counterforce elbow brace/strap, taping as needed.  * wrist brace as needed.  * elbow massage and icing  * shoulder range of motion, gentle  * gentle neck range of motion.  * NDAIDS as needed.  *  acupuncture when able.        * return to clinic 6 weeks for clinical recheck.    * workability note. Return to work with restrictions, no use of left upper extremity.    * All questions were addressed and answered prior to discharge from " clinic today. The patient acknowledges an understanding of and agreement with the plan set forth during today's visit. Patient was advised to call our office or MyChart us if any further questions arise upon leaving our office today.        Jose Daniel Valenzuela M.D., M.S.  Dept. of Orthopaedic Surgery  [facility]        Again, thank you for allowing me to participate in the care of your patient.        Sincerely,        Jose Daniel Valenzuela MD

## 2023-01-04 ENCOUNTER — OFFICE VISIT (OUTPATIENT)
Dept: ORTHOPEDICS | Facility: CLINIC | Age: 58
End: 2023-01-04
Payer: OTHER MISCELLANEOUS

## 2023-01-04 VITALS
WEIGHT: 195 LBS | DIASTOLIC BLOOD PRESSURE: 88 MMHG | HEART RATE: 120 BPM | OXYGEN SATURATION: 97 % | SYSTOLIC BLOOD PRESSURE: 152 MMHG | BODY MASS INDEX: 25.84 KG/M2 | HEIGHT: 73 IN

## 2023-01-04 DIAGNOSIS — S59.902D ELBOW INJURY, LEFT, SUBSEQUENT ENCOUNTER: Primary | ICD-10-CM

## 2023-01-04 DIAGNOSIS — M77.12 LATERAL EPICONDYLITIS OF LEFT ELBOW: ICD-10-CM

## 2023-01-04 PROCEDURE — 99213 OFFICE O/P EST LOW 20 MIN: CPT | Performed by: ORTHOPAEDIC SURGERY

## 2023-01-04 ASSESSMENT — PAIN SCALES - GENERAL: PAINLEVEL: MILD PAIN (3)

## 2023-01-04 NOTE — LETTER
January 4, 2023      Refugio Davies  [address]  [place] [facility] [place] [facility] [address]        To Whom It May Concern,     Refugio Davies attended clinic here on Jan 4, 2023 and he can return to work with restrictions: light duty only such as Keyboarding, paperwork, mousing, phone use.  Restrictions in place for 6 weeks  If you have questions or concerns, please call the clinic at the number listed above.    Sincerely,         Philipp Odell PA-C, CAQ-OS  Dept. of Orthopedic Surgery  [facility]

## 2023-01-04 NOTE — LETTER
"    1/4/2023         RE: Refugio Davies  [address]  [facility] [address]        Dear Colleague,    Thank you for referring your patient, Refugio Davies, to the [facility] [place]. Please see a copy of my visit note below.    Chief Complaint:   Chief Complaint   Patient presents with     Left Elbow - RECHECK     Work comp DOI 01-06-22      DATE of INJURY: 1/6/2022      HPI: Refugio Davies is a 57 year old male , right -hand dominant, who presents for followup evaluation and management of a left elbow and forearm injury, now 12 months out. Returns today doing about the same , no real changes. The intermittent numbness and tingling seems to be less, but has today. The intermittent \"twinges\" of shooting pain seem to be less intense but still happen and are painful. Noticed 3 times while at sisters over the weekend. Still has some \"popping\" which is painful.    He had a consult with an upper extremity elbow specialist at the [facility] (Dr Jacob) 10/20/2022, whom recommended continued nonsurgical management at this time until at least 1 year nonop management.    Refugio had left elbow TENEX tenotomy procedure with sports medicine on 9/12/2022. He doesn't think its helped at all. Still gets intermittent pain in the elbow and down the forearm \"out of the blue\" at rest, watching TV, etc. Lasts for minutes then goes away, but overall less intense than previous. Still some numbness and tingling into the ring/small fingers at times, not constant, less frequent than previously.    Recall he was doing well, but had a flare with return to work on 6/6/2022, so we then cut him back down again with work. He then numbness and tingling into his ring and small fingers and the thumb, intermittent stabbing pain into the forearm. The pain with just sitting or when reaching for a pillow.    He had a PRP injection 4/5/2022 with Dr Miranda. Seemed to have helped some initially.     He also had " cortisone injection 7/2022, helped for about 5 days..    INJURY:  1/6/2022 while at work, pulling a garbage can out of the snow and felt a pop and pain down the forearm to the hand. Worked through the day with the pain. Presented to the ED that night with xrays of the forearm and wrist negative.          He reports having moderate  pain/discomfort around the injury site. Intermittent numbness and tingling into the ring/small fingers, thumb.  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   Patient Active Problem List   Diagnosis     CARDIOVASCULAR SCREENING; LDL GOAL LESS THAN 160     Tobacco use disorder     Other idiopathic scoliosis, lumbar region     History of pneumothorax       Past surgical history:  has a past surgical history that includes Open reduction internal fixation rodding intramedullary femur (Right, 1997) and Lung surgery (Right, 2009).     Medications:   Current Outpatient Medications:      acetaminophen (TYLENOL) 500 MG tablet, Take 500-1,000 mg by mouth every 6 hours as needed for mild pain, Disp: , Rfl:      Ascorbic Acid (VITAMIN C) 500 MG CHEW, Take 2 tablets by mouth daily, Disp: , Rfl:      ibuprofen (ADVIL/MOTRIN) 200 MG tablet, Take 600 mg by mouth every 4 hours as needed for mild pain, Disp: , Rfl:      MULTIPLE VITAMIN PO, Take by mouth daily, Disp: , Rfl:      Omega-3 Fatty Acids (FISH OIL) 1200 MG capsule, Take 1,200 mg by mouth daily, Disp: , Rfl:      vitamin D3 (CHOLECALCIFEROL) 50 mcg (2000 units) tablet, Take 2 tablets by mouth daily, Disp: , Rfl:      NEW MED, Vitapulse CoQ10, PPQ, Disp: , Rfl:     Allergies:   No Known Allergies     Family  "History: family history includes Breast Cancer in his mother; Diabetes in his maternal aunt; Thyroid Disease in his brother.     Social History:  reports that he has been smoking cigarettes. He has been smoking an average of .5 packs per day. He has never used smokeless tobacco. He reports current alcohol use. He reports that he does not use drugs.    Review of Systems:     Denies numbness, tingling, parasthesias.   Denies headaches.   Denies fevers, chills, night sweats   Denies chest pain.   Denies shortness of breath.   Denies any skin problems, abrasions, rashes, irritation.      Physical Exam  GENERAL APPEARANCE: healthy, alert, no distress. Accompanied by QRC  SKIN: no suspicious lesions or rashes  RESPIRATORY: No increased work of breathing.  NEURO: Normal strength and tone, mentation intact and speech normal  PSYCH:  mentation appears normal and affect normal. Not anxious.    BP (!) 152/88 (BP Location: Right arm, Patient Position: Sitting, Cuff Size: Adult Large)   Pulse 120   Ht 1.854 m (6' 1\")   Wt 88.5 kg (195 lb)   SpO2 97%   BMI 25.73 kg/m         LEFT ELBOW:  Skin intact. No open wounds. No skin maceration.  Inspection: no swelling, no ecchymosis, no olecranon bursa swelling, no distal bicep tendon defect  Tender: moderate tender to palpation at lateral epicondyle, common extensor tendon    mild- tender to palpation medial epicondyle, flexor pronator mass.  Non-tender: supracondylar notch, olecranon bursa and distal bicep tendon  Range of Motion: all normal   Strength: elbow strength full  Mild discomfort with resisted wrist extension, long finger extension.  No discomfort today with resisted wrist flexion, index finger extension.  Positive Tinels medial elbow over ulnar nerve.      [facility], 7/15/2022: Essentially normal study of the left upper extremity. Given the clinical symptoms, there is no compression of the ulnar nerve at the elbow. There is no " "electrophysiologic evidence of radiculopathy, plexopathy, neuropathy or myopathy.      X-rays: no new images today.  3 views left wrist, 2 views left forearm, [facility], from 1/6/2022 -- No obvious fractures or dislocations..    MRI left elbow 1/19/2022:  On the background of tendinosis, moderate-grade intrasubstance tear of the common extensor tendon at its proximal attachment.        Assessment: 57 year old male , right -hand dominant, with:  1)  chronic left elbow pain, strain following work injury, underlying lateral epicondylitis with partial tearing.  2) acute ulnar neuropathy          Plan:   * again, unclear as to why continues to have these intermittent pains, or \"flares\" he calls them  * doesn't sound like making any improvements since last visit.  * I understand and respect his frustrations without significant improvement from onset.  * will refer for an updated MRI, to see if there are any changes for the better or worse.  * will continue the course and see where things go. Likely back to Dr Jacob sometime after the new MRI reassessment, possible surgical discussion.    * recommend lifting with palm up, supination, as this will take some stress off the extensors.  * continue light exercises for the elbow, forearm. Nothing heavy or strenuous.    * counterforce elbow brace/strap, taping as needed.  * wrist brace as needed.  * elbow massage and icing  * shoulder range of motion, gentle  * gentle neck range of motion.  * NDAIDS as needed.  *  acupuncture when able.        * return to clinic 6 weeks for clinical recheck.    * workability note. Return to work with restrictions, limited light use of the left arm such as computer/keyboard, phone, paperwork. Nothing heavy or strenuous. No lifting, pushing, pulling.    * All questions were addressed and answered prior to discharge from clinic today. The patient acknowledges an understanding of and agreement with the plan set forth during today's visit. Patient " was advised to call our office or MyChart us if any further questions arise upon leaving our office today.        Jose Daniel Valenzuela M.D., M.S.  Dept. of Orthopaedic Surgery  [facility]        Again, thank you for allowing me to participate in the care of your patient.        Sincerely,        Jose Daniel Valenzuela MD

## 2023-01-04 NOTE — PROGRESS NOTES
"Chief Complaint:   Chief Complaint   Patient presents with     Left Elbow - RECHECK     Work comp DOI 01-06-22      DATE of INJURY: 1/6/2022      HPI: Refugio Davies is a 57 year old male , right -hand dominant, who presents for followup evaluation and management of a left elbow and forearm injury, now 12 months out. Returns today doing about the same , no real changes. The intermittent numbness and tingling seems to be less, but has today. The intermittent \"twinges\" of shooting pain seem to be less intense but still happen and are painful. Noticed 3 times while at sisters over the weekend. Still has some \"popping\" which is painful.    He had a consult with an upper extremity elbow specialist at the [facility] (Dr Jacob) 10/20/2022, whom recommended continued nonsurgical management at this time until at least 1 year nonop management.    Refugio had left elbow TENEX tenotomy procedure with sports medicine on 9/12/2022. He doesn't think its helped at all. Still gets intermittent pain in the elbow and down the forearm \"out of the blue\" at rest, watching TV, etc. Lasts for minutes then goes away, but overall less intense than previous. Still some numbness and tingling into the ring/small fingers at times, not constant, less frequent than previously.    Recall he was doing well, but had a flare with return to work on 6/6/2022, so we then cut him back down again with work. He then numbness and tingling into his ring and small fingers and the thumb, intermittent stabbing pain into the forearm. The pain with just sitting or when reaching for a pillow.    He had a PRP injection 4/5/2022 with Dr Miranda. Seemed to have helped some initially.     He also had cortisone injection 7/2022, helped for about 5 days..    INJURY:  1/6/2022 while at work, pulling a garbage can out of the snow and felt a pop and pain down the forearm to the hand. Worked through the day with the pain. Presented to the ED that night with " xrays of the forearm and wrist negative.          He reports having moderate  pain/discomfort around the injury site. Intermittent numbness and tingling into the ring/small fingers, thumb.  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   Patient Active Problem List   Diagnosis     CARDIOVASCULAR SCREENING; LDL GOAL LESS THAN 160     Tobacco use disorder     Other idiopathic scoliosis, lumbar region     History of pneumothorax       Past surgical history:  has a past surgical history that includes Open reduction internal fixation rodding intramedullary femur (Right, 1997) and Lung surgery (Right, 2009).     Medications:   Current Outpatient Medications:      acetaminophen (TYLENOL) 500 MG tablet, Take 500-1,000 mg by mouth every 6 hours as needed for mild pain, Disp: , Rfl:      Ascorbic Acid (VITAMIN C) 500 MG CHEW, Take 2 tablets by mouth daily, Disp: , Rfl:      ibuprofen (ADVIL/MOTRIN) 200 MG tablet, Take 600 mg by mouth every 4 hours as needed for mild pain, Disp: , Rfl:      MULTIPLE VITAMIN PO, Take by mouth daily, Disp: , Rfl:      Omega-3 Fatty Acids (FISH OIL) 1200 MG capsule, Take 1,200 mg by mouth daily, Disp: , Rfl:      vitamin D3 (CHOLECALCIFEROL) 50 mcg (2000 units) tablet, Take 2 tablets by mouth daily, Disp: , Rfl:      NEW MED, Vitapulse CoQ10, PPQ, Disp: , Rfl:     Allergies:   No Known Allergies     Family History: family history includes Breast Cancer in his mother; Diabetes in his maternal aunt; Thyroid Disease in his brother.     Social History:  reports that he has been smoking cigarettes. He has been smoking an average of .5 packs per day. He has never used  "smokeless tobacco. He reports current alcohol use. He reports that he does not use drugs.    Review of Systems:     Denies numbness, tingling, parasthesias.   Denies headaches.   Denies fevers, chills, night sweats   Denies chest pain.   Denies shortness of breath.   Denies any skin problems, abrasions, rashes, irritation.      Physical Exam  GENERAL APPEARANCE: healthy, alert, no distress. Accompanied by QRC  SKIN: no suspicious lesions or rashes  RESPIRATORY: No increased work of breathing.  NEURO: Normal strength and tone, mentation intact and speech normal  PSYCH:  mentation appears normal and affect normal. Not anxious.    BP (!) 152/88 (BP Location: Right arm, Patient Position: Sitting, Cuff Size: Adult Large)   Pulse 120   Ht 1.854 m (6' 1\")   Wt 88.5 kg (195 lb)   SpO2 97%   BMI 25.73 kg/m         LEFT ELBOW:  Skin intact. No open wounds. No skin maceration.  Inspection: no swelling, no ecchymosis, no olecranon bursa swelling, no distal bicep tendon defect  Tender: moderate tender to palpation at lateral epicondyle, common extensor tendon    mild- tender to palpation medial epicondyle, flexor pronator mass.  Non-tender: supracondylar notch, olecranon bursa and distal bicep tendon  Range of Motion: all normal   Strength: elbow strength full  Mild discomfort with resisted wrist extension, long finger extension.  No discomfort today with resisted wrist flexion, index finger extension.  Positive Tinels medial elbow over ulnar nerve.      [facility], 7/15/2022: Essentially normal study of the left upper extremity. Given the clinical symptoms, there is no compression of the ulnar nerve at the elbow. There is no electrophysiologic evidence of radiculopathy, plexopathy, neuropathy or myopathy.      X-rays: no new images today.  3 views left wrist, 2 views left forearm, [facility], from 1/6/2022 -- No obvious fractures or dislocations..    MRI left elbow 1/19/2022:  On the background " "of tendinosis, moderate-grade intrasubstance tear of the common extensor tendon at its proximal attachment.        Assessment: 57 year old male , right -hand dominant, with:  1)  chronic left elbow pain, strain following work injury, underlying lateral epicondylitis with partial tearing.  2) acute ulnar neuropathy          Plan:   * again, unclear as to why continues to have these intermittent pains, or \"flares\" he calls them  * doesn't sound like making any improvements since last visit.  * I understand and respect his frustrations without significant improvement from onset.  * will refer for an updated MRI, to see if there are any changes for the better or worse.  * will continue the course and see where things go. Likely back to Dr Jacob sometime after the new MRI reassessment, possible surgical discussion.    * recommend lifting with palm up, supination, as this will take some stress off the extensors.  * continue light exercises for the elbow, forearm. Nothing heavy or strenuous.    * counterforce elbow brace/strap, taping as needed.  * wrist brace as needed.  * elbow massage and icing  * shoulder range of motion, gentle  * gentle neck range of motion.  * NDAIDS as needed.  *  acupuncture when able.        * return to clinic 6 weeks for clinical recheck.    * workability note. Return to work with restrictions, limited light use of the left arm such as computer/keyboard, phone, paperwork. Nothing heavy or strenuous. No lifting, pushing, pulling.    * All questions were addressed and answered prior to discharge from clinic today. The patient acknowledges an understanding of and agreement with the plan set forth during today's visit. Patient was advised to call our office or MyChart us if any further questions arise upon leaving our office today.        Jose Daniel Valenzuela M.D., M.S.  Dept. of Orthopaedic Surgery  [facility]    "

## 2023-01-25 ENCOUNTER — ANCILLARY PROCEDURE (OUTPATIENT)
Dept: MRI IMAGING | Facility: CLINIC | Age: 58
End: 2023-01-25
Attending: PHYSICIAN ASSISTANT
Payer: OTHER MISCELLANEOUS

## 2023-01-25 DIAGNOSIS — S59.902D ELBOW INJURY, LEFT, SUBSEQUENT ENCOUNTER: ICD-10-CM

## 2023-01-25 DIAGNOSIS — M77.12 LATERAL EPICONDYLITIS OF LEFT ELBOW: ICD-10-CM

## 2023-01-25 PROCEDURE — 73221 MRI JOINT UPR EXTREM W/O DYE: CPT | Mod: LT | Performed by: RADIOLOGY

## 2023-01-30 ENCOUNTER — TELEPHONE (OUTPATIENT)
Dept: SURGERY | Facility: CLINIC | Age: 58
End: 2023-01-30
Payer: COMMERCIAL

## 2023-01-30 DIAGNOSIS — S59.902S ELBOW INJURY, LEFT, SEQUELA: Primary | ICD-10-CM

## 2023-01-30 DIAGNOSIS — M77.12 LATERAL EPICONDYLITIS OF LEFT ELBOW: ICD-10-CM

## 2023-01-30 NOTE — TELEPHONE ENCOUNTER
Patient's QRC Anita called asking for MRI results to be relayed to Refugio, and also what is the next step in treatment.  QRC can be reached at [phone]

## 2023-01-31 NOTE — TELEPHONE ENCOUNTER
I spoke to Refugio and read him the MRI. I put in referral to see Dr. Jacob to consider surgical treatment options.     Philipp Odell PA-C, CAQ-OS  Dept. of Orthopedic Surgery  [facility]

## 2023-03-02 ENCOUNTER — OFFICE VISIT (OUTPATIENT)
Dept: ORTHOPEDICS | Facility: CLINIC | Age: 58
End: 2023-03-02
Attending: PHYSICIAN ASSISTANT
Payer: OTHER MISCELLANEOUS

## 2023-03-02 DIAGNOSIS — M77.12 LATERAL EPICONDYLITIS OF LEFT ELBOW: Primary | ICD-10-CM

## 2023-03-02 DIAGNOSIS — S59.902S ELBOW INJURY, LEFT, SEQUELA: ICD-10-CM

## 2023-03-02 PROCEDURE — 99213 OFFICE O/P EST LOW 20 MIN: CPT | Performed by: ORTHOPAEDIC SURGERY

## 2023-03-02 RX ORDER — LIDOCAINE 4 G/G
1 PATCH TOPICAL EVERY 24 HOURS
Qty: 30 PATCH | Refills: 0 | Status: SHIPPED | OUTPATIENT
Start: 2023-03-02 | End: 2023-09-11

## 2023-03-02 NOTE — LETTER
2023     Seen today: Yes    Patient:  Refugio Davies  :   1965  MRN:     [id number]  Physician: ASHA HERNANDEZ    To Whom It May Concern,     Refugio Davies attended clinic here on 2023 and he can return to work with restrictions: light duty only such as Keyboarding, paperwork, mousing, phone use.  Restrictions in place for 8 weeks  If you have questions or concerns, please call the clinic at the number listed above.    Sincerely,         Electronically signed by ASHA HERNANDEZ MD

## 2023-03-02 NOTE — PROGRESS NOTES
CHIEF COMPLAINT: Left elbow pain     DIAGNOSIS: Left elbow lateral epicondylitis    OCCUPATION/SPORT: works for [employer].  On light duty as of right now.     HPI:  Refugio is a very pleasant 57 year old, right-hand dominant male who presents for evaluation of left elbow pain. He saw Dr. Valenzuela and completed and MRI on his left elbow.  He states that his elbow pain has not changed since his last visit.  This is a work comp case.  he has previously had a PRP injection, Tenex procedure.  Patient also did physical therapy, no dry needling.       REVIEW OF SYSTEMS: Positive for that noted in past medical history and history of present illness and otherwise reviewed in EMR      PHYSICAL EXAM:  Patient is Data Unavailable and weighs 0 lbs 0 oz. There were no vitals taken for this visit.  Constitutional: Well-developed, well-nourished, healthy appearing male.  Skin: Warm, dry   HEENT: Normal  Cardiac: Well perfused extremities, strong 2+ peripheral pulses. No edema.   Pulmonary: Breathing room air     Musculoskeletal:   Left Elbow:  Full passive range of motion from 0 to 140 degrees, full pronation and supination  Nontender over the medial epicondyle, olecranon tip, distal biceps.  Tender over the lateral epicondyle.  Minimal tenderness over the radial tunnel  Minimal pain with resisted wrist extension or ECRB, no pain with wrist flexion or finger flexion  Neurovascular exam and cervical spine exam are normal.    IMAGING:  I personally reviewed the new MRI which shows some tendinosis and partial tearing of the common extensor tendon consistent with lateral epicondylitis     ASSESSMENT/SURGICAL DIAGNOSIS: 57 year old-year-old right hand dominant male with left elbow lateral epicondylitis (Tennis elbow).     PLAN:  I had a nice discussion with Refugio today. I reviewed the diagnosis and prognosis of lateral epicondylitis (AKA Tennis Elbow).  Through the MRI and discussed that I not see any significant change.  We  discussed that it can take 1 to 2 years to see movement after lateral epicondylitis.  Patient is already undergone a PRP injection, Tenex.  We discussed additional measures including anti-inflammatories, lidocaine patches, Voltaren gel, physical therapy with dry needling.  We discussed that if patient fails to improve may consider a arthroscopic partial lateral epicondylectomy, I do not do this surgery and recommended that patient can see one of my partners if needed in the future for this.  Patient can return to seeing Dr. Valenzuela for additional follow-up.    At the conclusion of the office visit, Refugio verbally acknowledged that I answered all questions satisfactorily.

## 2023-03-02 NOTE — LETTER
3/2/2023         RE: Refugio Davies  [address]  [place] [address]        Dear Colleague,    Thank you for referring your patient, Refugio Davies, to the [facility] [place]. Please see a copy of my visit note below.    CHIEF COMPLAINT: Left elbow pain     DIAGNOSIS: Left elbow lateral epicondylitis    OCCUPATION/SPORT: works for [employer].  On light duty as of right now.     HPI:  Refugio is a very pleasant 57 year old, right-hand dominant male who presents for evaluation of left elbow pain. He saw Dr. Valenzuela and completed and MRI on his left elbow.  He states that his elbow pain has not changed since his last visit.  This is a work comp case.  he has previously had a PRP injection, Tenex procedure.  Patient also did physical therapy, no dry needling.       REVIEW OF SYSTEMS: Positive for that noted in past medical history and history of present illness and otherwise reviewed in EMR      PHYSICAL EXAM:  Patient is Data Unavailable and weighs 0 lbs 0 oz. There were no vitals taken for this visit.  Constitutional: Well-developed, well-nourished, healthy appearing male.  Skin: Warm, dry   HEENT: Normal  Cardiac: Well perfused extremities, strong 2+ peripheral pulses. No edema.   Pulmonary: Breathing room air     Musculoskeletal:   Left Elbow:  Full passive range of motion from 0 to 140 degrees, full pronation and supination  Nontender over the medial epicondyle, olecranon tip, distal biceps.  Tender over the lateral epicondyle.  Minimal tenderness over the radial tunnel  Minimal pain with resisted wrist extension or ECRB, no pain with wrist flexion or finger flexion  Neurovascular exam and cervical spine exam are normal.    IMAGING:  I personally reviewed the new MRI which shows some tendinosis and partial tearing of the common extensor tendon consistent with lateral epicondylitis     ASSESSMENT/SURGICAL DIAGNOSIS: 57 year old-year-old right hand dominant male with  left elbow lateral epicondylitis (Tennis elbow).     PLAN:  I had a nice discussion with Refugio today. I reviewed the diagnosis and prognosis of lateral epicondylitis (AKA Tennis Elbow).  Through the MRI and discussed that I not see any significant change.  We discussed that it can take 1 to 2 years to see movement after lateral epicondylitis.  Patient is already undergone a PRP injection, Tenex.  We discussed additional measures including anti-inflammatories, lidocaine patches, Voltaren gel, physical therapy with dry needling.  We discussed that if patient fails to improve may consider a arthroscopic partial lateral epicondylectomy, I do not do this surgery and recommended that patient can see one of my partners if needed in the future for this.  Patient can return to seeing Dr. Valenzuela for additional follow-up.    At the conclusion of the office visit, Refugio verbally acknowledged that I answered all questions satisfactorily.    Sincerely,        ASHA HERNANDEZ MD

## 2023-03-09 ENCOUNTER — THERAPY VISIT (OUTPATIENT)
Dept: PHYSICAL THERAPY | Facility: CLINIC | Age: 58
End: 2023-03-09
Attending: ORTHOPAEDIC SURGERY
Payer: OTHER MISCELLANEOUS

## 2023-03-09 DIAGNOSIS — G89.29 CHRONIC ELBOW PAIN, LEFT: ICD-10-CM

## 2023-03-09 DIAGNOSIS — M77.12 LATERAL EPICONDYLITIS OF LEFT ELBOW: ICD-10-CM

## 2023-03-09 DIAGNOSIS — S59.902S ELBOW INJURY, LEFT, SEQUELA: ICD-10-CM

## 2023-03-09 DIAGNOSIS — M25.522 CHRONIC ELBOW PAIN, LEFT: ICD-10-CM

## 2023-03-09 PROCEDURE — 97110 THERAPEUTIC EXERCISES: CPT | Mod: GP | Performed by: PHYSICAL THERAPIST

## 2023-03-09 PROCEDURE — 97112 NEUROMUSCULAR REEDUCATION: CPT | Mod: GP | Performed by: PHYSICAL THERAPIST

## 2023-03-09 PROCEDURE — 97161 PT EVAL LOW COMPLEX 20 MIN: CPT | Mod: GP | Performed by: PHYSICAL THERAPIST

## 2023-03-09 NOTE — PROGRESS NOTES
Physical Therapy Initial Evaluation  Subjective:  The history is provided by the patient.   Patient Health History  Refugio Davies being seen for Left arm, elbow injury.     Problem began: 1/6/2022.   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 . This is a work comp claim #[id number]   Pain is reported as 2/10 on pain scale.  General health as reported by patient is good.  Pertinent medical history includes: smoking.     Medical allergies: none.   Surgeries include:  Other. Other surgery history details: Right Femor repair, shaka steel, Right lung repiar numothorax.    Current medications:  None.    Current occupation is [employer].   Primary job tasks include:  Driving, lifting/carrying, pushing/pulling and repetitive tasks.                  Therapist Generated HPI Evaluation  Problem details: The patient is a pleasant 57 year old male who presents to the clinic with complaints of L elbow pain. He notes that last year he was lifting a heavy load at work, felt a pop in his left elbow and has experiences the same lingering elbow pain since then. He reports that he was receiving physical therapy for his elbow, shoulder, and neck, states that his shoulder and neck have been feeling better but that his elbow pain remains unchanged. The patient notes frustration with his lack of progress, stating he has been considering exploring surgical options to improve his symptoms. The patient present's presentation is consistent with the referring medical provider's medical diagnosis, and upon evaluation additionally presents with highly irritable radial and ulnar nerves..         Type of problem:  Left elbow.    This is a chronic condition.  Condition occurred with:  Lifting.  Where condition occurred: at work.  Patient reports pain:  Lateral epicondyle.  Pain is described as aching and sharp   Pain radiates to:  Elbow, lower arm, wrist and hand. Pain is the same all  the time.  Since onset symptoms are unchanged.  Associated symptoms:  Catching, loss of motion/stiffness, loss of strength, numbness and locking (Numbness into 4th, 5th digit, dorsum of L hand). Symptoms are exacerbated by activity, carrying and certain positions (laying on stomach arm elevated)  and relieved by rest.  Special tests included:  MRI.  Previous treatment includes physical therapy. There was none improvement following previous treatment.  Restrictions due to condition include:  Currently not working due to present treatment.                          Objective:  Standing Alignment:    Cervical/Thoracic:  Forward head                                              ROM:  AROM:      Flexion Elbow:  Left:138   Right:140  Extension Elbow:  Left: lacking 3  from full extension    Right: WNL  Flexion Wrist:  Left: 51    Right: 62  Extension Wrist:  Left: 70    Right:  58  Supination Elbow/Wrist:  Left: 80Right: 76  Pronation Elbow/Wrist:  Left: 76    Right: 78            Strength:    Flexion Elbow:  Left: 4/5  Pain:+    Right: 5/5 Pain:  Extension Elbow: Left: 5/5 Pain:    Right: 5/5 Pain:  Flexion Wrist:  Left: 5/5 Pain:    Right: 5/5 Pain:  Extension Wrist: Left: 4+/5  Pain:+  Right: 5/5 Pain:  Supination Elbow/Wrist: Left: 4+/5  Pain:+    Right: 5/5 Pain:  Pronation Elbow/Wrist: Left: 5/5  Pain:+        Right: 5/5 Pain:  Radial Deviation:  Left: 4+/5  Pain:+    Right: 5/5 Pain:  Ulnar Deviation: Left: 5/5 Pain:    Right: 5/5 Pain:    Special Testing:  Special tests elbow/wrist: (+) Cozen, (+)mills, (+) Radial nerve, (+) Ulnar nerve, (-) median nerve, hyposensitivity on 4th, 5th digit, dorsum of hand.  Left wrist/elbow positive for the following special tests: Lateral Epicondylitis    Palpation:    Left wrist/elbow tenderness present at: Lateral Epicondyle; Medial Epicondyle and Radial Head  Right wrist/elbow tenderness not present at:Lateral Epicondyle; Medial Epicondyle or Radial Head                                 General     ROS    Assessment/Plan:    Patient is a 57 year old male with left side elbow complaints.    Patient has the following significant findings with corresponding treatment plan.                Diagnosis 1:  Left elbow pain  Pain -  hot/cold therapy, US, electric stimulation, manual therapy, self management, education and home program  Decreased ROM/flexibility - manual therapy and therapeutic exercise  Decreased joint mobility - manual therapy and therapeutic exercise  Decreased strength - therapeutic exercise and therapeutic activities  Impaired muscle performance - neuro re-education  Decreased function - therapeutic activities    Therapy Evaluation Codes:   1) History comprised of:   Personal factors that impact the plan of care:      Age, Time since onset of symptoms and Work status.    Comorbidity factors that impact the plan of care are:      Smoking.     Medications impacting care: None.  2) Examination of Body Systems comprised of:   Body structures and functions that impact the plan of care:      Elbow.   Activity limitations that impact the plan of care are:      Bending, Grasping, Lifting and Working.  3) Clinical presentation characteristics are:   Stable/Uncomplicated.  4) Decision-Making    Low complexity using standardized patient assessment instrument and/or measureable assessment of functional outcome.  Cumulative Therapy Evaluation is: Low complexity.    Previous and current functional limitations:  (See Goal Flow Sheet for this information)    Short term and Long term goals: (See Goal Flow Sheet for this information)     Communication ability:  Patient appears to be able to clearly communicate and understand verbal and written communication and follow directions correctly.  Treatment Explanation - The following has been discussed with the patient:   RX ordered/plan of care  Anticipated outcomes  Possible risks and side effects  This patient would benefit from PT intervention to  resume normal activities.   Rehab potential is good.    Frequency:  1 X week, once daily  Duration:  for 8 weeks  Discharge Plan:  Achieve all LTG.  Independent in home treatment program.  Reach maximal therapeutic benefit.    Please refer to the daily flowsheet for treatment today, total treatment time and time spent performing 1:1 timed codes.

## 2023-03-09 NOTE — LETTER
[facility]  [address] [facility] [address]  [phone]    March 10, 2023    Re: Refugio Davies   :   1965  MRN:  [id number]   REFERRING PHYSICIAN:   MD [facility]  Date of Initial Evaluation:  3/09/2023  Visits:  Rxs Used: 1  Reason for Referral:     Elbow injury, left, sequela  Lateral epicondylitis of left elbow  Chronic elbow pain, left    EVALUATION SUMMARY    Physical Therapy Initial Evaluation  Subjective:  The history is provided by the patient.   Patient Health History  Refugio Davies being seen for Left arm, elbow injury.     Problem began: 2022.   Problem occurred: At work moving garbage cans with [employer] . This is a work comp claim #[id number]   Pain is reported as 2/10 on pain scale.  General health as reported by patient is good.  Pertinent medical history includes: smoking.     Medical allergies: none.   Surgeries include:  Other. Other surgery history details: Right Femor repair, shaka steel, Right lung repiar numothorax.    Current medications:  None.    Current occupation is [employer].   Primary job tasks include:  Driving, lifting/carrying, pushing/pulling and repetitive tasks.                Therapist Generated HPI Evaluation  Problem details: The patient is a pleasant 57 year old male who presents to the clinic with complaints of L elbow pain. He notes that last year he was lifting a heavy load at work, felt a pop in his left elbow and has experiences the same lingering elbow pain since then. He reports that he was receiving physical therapy for his elbow, shoulder, and neck, states that his shoulder and neck have been feeling better but that his elbow pain remains unchanged. The patient notes frustration with his lack of progress, stating he has been considering exploring surgical options to improve his  symptoms. The patient present's presentation is consistent with the referring medical provider's medical diagnosis, and upon evaluation additionally presents with highly irritable radial and ulnar nerves..         Type of problem:  Left elbow.    Re: Refugio Davies   :   1965    This is a chronic condition.  Condition occurred with:  Lifting.  Where condition occurred: at work.  Patient reports pain:  Lateral epicondyle.  Pain is described as aching and sharp   Pain radiates to:  Elbow, lower arm, wrist and hand. Pain is the same all the time.  Since onset symptoms are unchanged.  Associated symptoms:  Catching, loss of motion/stiffness, loss of strength, numbness and locking (Numbness into 4th, 5th digit, dorsum of L hand). Symptoms are exacerbated by activity, carrying and certain positions (laying on stomach arm elevated)  and relieved by rest.  Special tests included:  MRI.  Previous treatment includes physical therapy. There was none improvement following previous treatment.  Restrictions due to condition include:  Currently not working due to present treatment.    Objective:  Standing Alignment:    Cervical/Thoracic:  Forward head       ROM:  AROM:    Flexion Elbow:  Left:138   Right:140  Extension Elbow:  Left: lacking 3  from full extension    Right: WNL  Flexion Wrist:  Left: 51    Right: 62  Extension Wrist:  Left: 70    Right:  58  Supination Elbow/Wrist:  Left: 80Right: 76  Pronation Elbow/Wrist:  Left: 76    Right: 78    Strength:    Flexion Elbow:  Left: 4/5  Pain:+    Right: 5/5 Pain:  Extension Elbow: Left: 5/5 Pain:    Right: 5/5 Pain:  Flexion Wrist:  Left: 5/5 Pain:    Right: 5/5 Pain:  Extension Wrist: Left: 4+/5  Pain:+  Right: 5/5 Pain:  Supination Elbow/Wrist: Left: 4+/5  Pain:+    Right: 5/5 Pain:  Pronation Elbow/Wrist: Left: 5/5  Pain:+        Right: 5/5 Pain:  Radial Deviation:  Left: 4+/5  Pain:+    Right: 5/5 Pain:  Ulnar Deviation: Left: 5/5 Pain:    Right: 5/5 Pain:    Special  Testing:  Special tests elbow/wrist: (+) Cozen, (+)mills, (+) Radial nerve, (+) Ulnar nerve, (-) median nerve, hyposensitivity on 4th, 5th digit, dorsum of hand.  Left wrist/elbow positive for the following special tests: Lateral Epicondylitis    Palpation:    Left wrist/elbow tenderness present at: Lateral Epicondyle; Medial Epicondyle and Radial Head  Right wrist/elbow tenderness not present at:Lateral Epicondyle; Medial Epicondyle or Radial Head           Re: Refugio Davies   :   1965       Assessment/Plan:    Patient is a 57 year old male with left side elbow complaints.    Patient has the following significant findings with corresponding treatment plan.                Diagnosis 1:  Left elbow pain  Pain -  hot/cold therapy, US, electric stimulation, manual therapy, self management, education and home program  Decreased ROM/flexibility - manual therapy and therapeutic exercise  Decreased joint mobility - manual therapy and therapeutic exercise  Decreased strength - therapeutic exercise and therapeutic activities  Impaired muscle performance - neuro re-education  Decreased function - therapeutic activities    Therapy Evaluation Codes:   1) History comprised of:   Personal factors that impact the plan of care:      Age, Time since onset of symptoms and Work status.    Comorbidity factors that impact the plan of care are:      Smoking.     Medications impacting care: None.  2) Examination of Body Systems comprised of:   Body structures and functions that impact the plan of care:      Elbow.   Activity limitations that impact the plan of care are:      Bending, Grasping, Lifting and Working.  3) Clinical presentation characteristics are:   Stable/Uncomplicated.  4) Decision-Making    Low complexity using standardized patient assessment instrument and/or measureable assessment of functional outcome.  Cumulative Therapy Evaluation is: Low complexity.    Previous and current functional limitations:  (See Goal  Flow Sheet for this information)    Short term and Long term goals: (See Goal Flow Sheet for this information)     Communication ability:  Patient appears to be able to clearly communicate and understand verbal and written communication and follow directions correctly.  Treatment Explanation - The following has been discussed with the patient:   RX ordered/plan of care  Anticipated outcomes  Possible risks and side effects  This patient would benefit from PT intervention to resume normal activities.   Rehab potential is good.    Frequency:  1 X week, once daily  Duration:  for 8 weeks  Discharge Plan:  Achieve all LTG.  Independent in home treatment program.  Reach maximal therapeutic benefit.    Please refer to the daily flowsheet for treatment today, total treatment time and time spent performing 1:1 timed codes.     Re: Refugio Davies   :   1965        Thank you for your referral.    INQUIRIES  Therapist: Harvey Wang DPT   [facility] SERVICES [address] [address]  Phone: [phone]  Fax: [phone]

## 2023-03-16 ENCOUNTER — THERAPY VISIT (OUTPATIENT)
Dept: PHYSICAL THERAPY | Facility: CLINIC | Age: 58
End: 2023-03-16
Attending: ORTHOPAEDIC SURGERY
Payer: OTHER MISCELLANEOUS

## 2023-03-16 DIAGNOSIS — G89.29 CHRONIC ELBOW PAIN, LEFT: Primary | ICD-10-CM

## 2023-03-16 DIAGNOSIS — M25.522 CHRONIC ELBOW PAIN, LEFT: Primary | ICD-10-CM

## 2023-03-16 PROCEDURE — 97112 NEUROMUSCULAR REEDUCATION: CPT | Mod: GP | Performed by: PHYSICAL THERAPIST

## 2023-03-16 PROCEDURE — 97140 MANUAL THERAPY 1/> REGIONS: CPT | Mod: GP | Performed by: PHYSICAL THERAPIST

## 2023-03-16 PROCEDURE — 97110 THERAPEUTIC EXERCISES: CPT | Mod: GP | Performed by: PHYSICAL THERAPIST

## 2023-03-23 ENCOUNTER — THERAPY VISIT (OUTPATIENT)
Dept: PHYSICAL THERAPY | Facility: CLINIC | Age: 58
End: 2023-03-23
Attending: ORTHOPAEDIC SURGERY
Payer: OTHER MISCELLANEOUS

## 2023-03-23 DIAGNOSIS — G89.29 CHRONIC ELBOW PAIN, LEFT: Primary | ICD-10-CM

## 2023-03-23 DIAGNOSIS — M25.522 CHRONIC ELBOW PAIN, LEFT: Primary | ICD-10-CM

## 2023-03-23 PROCEDURE — 97140 MANUAL THERAPY 1/> REGIONS: CPT | Mod: GP | Performed by: PHYSICAL THERAPIST

## 2023-03-23 PROCEDURE — 97110 THERAPEUTIC EXERCISES: CPT | Mod: GP | Performed by: PHYSICAL THERAPIST

## 2023-03-23 PROCEDURE — 97112 NEUROMUSCULAR REEDUCATION: CPT | Mod: GP | Performed by: PHYSICAL THERAPIST

## 2023-03-30 ENCOUNTER — THERAPY VISIT (OUTPATIENT)
Dept: PHYSICAL THERAPY | Facility: CLINIC | Age: 58
End: 2023-03-30
Payer: OTHER MISCELLANEOUS

## 2023-03-30 DIAGNOSIS — G89.29 CHRONIC ELBOW PAIN, LEFT: Primary | ICD-10-CM

## 2023-03-30 DIAGNOSIS — M25.522 CHRONIC ELBOW PAIN, LEFT: Primary | ICD-10-CM

## 2023-03-30 PROCEDURE — 97140 MANUAL THERAPY 1/> REGIONS: CPT | Mod: GP | Performed by: PHYSICAL THERAPIST

## 2023-03-30 PROCEDURE — 97112 NEUROMUSCULAR REEDUCATION: CPT | Mod: GP | Performed by: PHYSICAL THERAPIST

## 2023-03-30 PROCEDURE — 97110 THERAPEUTIC EXERCISES: CPT | Mod: GP | Performed by: PHYSICAL THERAPIST

## 2023-04-06 ENCOUNTER — THERAPY VISIT (OUTPATIENT)
Dept: PHYSICAL THERAPY | Facility: CLINIC | Age: 58
End: 2023-04-06
Payer: OTHER MISCELLANEOUS

## 2023-04-06 DIAGNOSIS — G89.29 CHRONIC ELBOW PAIN, LEFT: Primary | ICD-10-CM

## 2023-04-06 DIAGNOSIS — M25.522 CHRONIC ELBOW PAIN, LEFT: Primary | ICD-10-CM

## 2023-04-06 PROCEDURE — 97112 NEUROMUSCULAR REEDUCATION: CPT | Mod: 59 | Performed by: PHYSICAL THERAPIST

## 2023-04-06 PROCEDURE — 97110 THERAPEUTIC EXERCISES: CPT | Mod: 59 | Performed by: PHYSICAL THERAPIST

## 2023-04-06 PROCEDURE — 97530 THERAPEUTIC ACTIVITIES: CPT | Mod: GP | Performed by: PHYSICAL THERAPIST

## 2023-04-13 ENCOUNTER — THERAPY VISIT (OUTPATIENT)
Dept: PHYSICAL THERAPY | Facility: CLINIC | Age: 58
End: 2023-04-13
Payer: OTHER MISCELLANEOUS

## 2023-04-13 DIAGNOSIS — G89.29 CHRONIC ELBOW PAIN, LEFT: Primary | ICD-10-CM

## 2023-04-13 DIAGNOSIS — M25.522 CHRONIC ELBOW PAIN, LEFT: Primary | ICD-10-CM

## 2023-04-13 PROCEDURE — 97140 MANUAL THERAPY 1/> REGIONS: CPT | Mod: GP | Performed by: PHYSICAL THERAPIST

## 2023-04-13 PROCEDURE — 97110 THERAPEUTIC EXERCISES: CPT | Mod: GP | Performed by: PHYSICAL THERAPIST

## 2023-04-13 PROCEDURE — 97530 THERAPEUTIC ACTIVITIES: CPT | Mod: GP | Performed by: PHYSICAL THERAPIST

## 2023-04-20 ENCOUNTER — THERAPY VISIT (OUTPATIENT)
Dept: PHYSICAL THERAPY | Facility: CLINIC | Age: 58
End: 2023-04-20
Payer: OTHER MISCELLANEOUS

## 2023-04-20 DIAGNOSIS — G89.29 CHRONIC ELBOW PAIN, LEFT: Primary | ICD-10-CM

## 2023-04-20 DIAGNOSIS — M25.522 CHRONIC ELBOW PAIN, LEFT: Primary | ICD-10-CM

## 2023-04-20 PROCEDURE — 97140 MANUAL THERAPY 1/> REGIONS: CPT | Mod: GP | Performed by: PHYSICAL THERAPIST

## 2023-04-20 PROCEDURE — 97110 THERAPEUTIC EXERCISES: CPT | Mod: GP | Performed by: PHYSICAL THERAPIST

## 2023-05-02 NOTE — PROGRESS NOTES
"Chief Complaint:   Chief Complaint   Patient presents with     Left Elbow - RECHECK     W/C. Left elbow pain/epicondylitis. DOI 1/6/22. Seen [facility] on 3/2/23. Tenex procedure: 9/12/22 with Adelita. Since his visit with Dr. Jacob he has seen a therapist at [place], finished physical therapy yesterday. Patient notes his elbow is not to bad, occasional flare ups. Patient is not back to work.      DATE of INJURY: 1/6/2022      HPI: Refugio Davies is a 57 year old male , right -hand dominant, who presents for followup evaluation and management of a left elbow and forearm injury, now 16 months out from injury. Returns today doing ok. Has been going to therapy in [place], finished yesterday. Elbow overall not too bad, occasional \"flareups\" maybe once a day or every other day, last about 15 minutes. Occasional pain to the ulnar aspect of the hand.    The intermittent numbness and tingling seems to be less, but will still occur at times.  Still has some \"crunching\" which is not really painful.    He had a consult with an upper extremity elbow specialist at the [facility] (Dr Jacob) 10/20/2022, whom recommended continued nonsurgical management   until at least 1 year nonop management. He had a repeat visit with Dr Jacob 3/2/2023, whom still didn't recommend surgery but more therapy. He was referred to [place] for dry needling, but they didn't actually do that there, but did therapy nonetheless which helped some anyway.      Treatments:  TENEX 9/12/2022 with Sports Medicine - doesn't think helped at all.  PRP injection 4/5/2022 with Sports Medicine - helped initially but briefly  Cortisone injection 7/2022 - helped about 5 days.  A lot of therapy.    INJURY:  1/6/2022 while at work, pulling a garbage can out of the snow and felt a pop and pain down the forearm to the hand. Worked through the day with the pain. Presented to the ED that night with xrays of the forearm and wrist negative.          He reports " having mild pain/discomfort around the injury site. Intermittent numbness and tingling into the ring/small fingers, thumb.  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   Patient Active Problem List   Diagnosis     CARDIOVASCULAR SCREENING; LDL GOAL LESS THAN 160     Tobacco use disorder     Other idiopathic scoliosis, lumbar region     History of pneumothorax     Chronic elbow pain, left       Past surgical history:  has a past surgical history that includes Open reduction internal fixation rodding intramedullary femur (Right, 1997) and Lung surgery (Right, 2009).     Medications:   Current Outpatient Medications:      acetaminophen (TYLENOL) 500 MG tablet, Take 500-1,000 mg by mouth every 6 hours as needed for mild pain, Disp: , Rfl:      Ascorbic Acid (VITAMIN C) 500 MG CHEW, Take 2 tablets by mouth daily, Disp: , Rfl:      diclofenac (VOLTAREN) 1 % topical gel, Apply 2 g topically 4 times daily, Disp: 2 g, Rfl: 0     ibuprofen (ADVIL/MOTRIN) 200 MG tablet, Take 600 mg by mouth every 4 hours as needed for mild pain, Disp: , Rfl:      Lidocaine (LIDOCARE) 4 % Patch, Place 1 patch onto the skin every 24 hours To prevent lidocaine toxicity, patient should be patch free for 12 hrs daily., Disp: 30 patch, Rfl: 0     MULTIPLE VITAMIN PO, Take by mouth daily, Disp: , Rfl:      NEW MED, Vitapulse CoQ10, PPQ, Disp: , Rfl:      Omega-3 Fatty Acids (FISH OIL) 1200 MG capsule, Take 1,200 mg by mouth daily, Disp: , Rfl:      vitamin D3 (CHOLECALCIFEROL) 50 mcg (2000 units) tablet, Take 2 tablets by mouth daily, Disp: , Rfl:     Allergies:   No Known Allergies     Family History: family  "history includes Breast Cancer in his mother; Diabetes in his maternal aunt; Thyroid Disease in his brother.     Social History:  reports that he has been smoking cigarettes. He has been smoking an average of .5 packs per day. He has never used smokeless tobacco. He reports current alcohol use. He reports that he does not use drugs.    Review of Systems:     Denies numbness, tingling, parasthesias.   Denies headaches.   Denies fevers, chills, night sweats   Denies chest pain.   Denies shortness of breath.   Denies any skin problems, abrasions, rashes, irritation.      Physical Exam  GENERAL APPEARANCE: healthy, alert, no distress. Accompanied by QRC  SKIN: no suspicious lesions or rashes  RESPIRATORY: No increased work of breathing.  NEURO: Normal strength and tone, mentation intact and speech normal  PSYCH:  mentation appears normal and affect normal. Not anxious.    /82   Pulse (!) 123   Ht 1.854 m (6' 1\")   Wt 90.9 kg (200 lb 4.8 oz)   BMI 26.43 kg/m         LEFT ELBOW:  Skin intact. No open wounds. No skin maceration.  Inspection: no swelling, no ecchymosis, no olecranon bursa swelling, no distal bicep tendon defect  Tender: slight  tender to palpation at lateral epicondyle, common extensor tendon    Slight tender to palpation medial epicondyle, flexor pronator mass.  Non-tender: supracondylar notch, olecranon bursa and distal bicep tendon  Range of Motion: all normal   Strength: elbow strength full  Slight  discomfort with resisted wrist extension, long finger extension.  No discomfort today with resisted wrist flexion, index finger extension.      [facility], 7/15/2022: Essentially normal study of the left upper extremity. Given the clinical symptoms, there is no compression of the ulnar nerve at the elbow. There is no electrophysiologic evidence of radiculopathy, plexopathy, neuropathy or myopathy.      X-rays: no new images today.  3 views left wrist, 2 views left forearm, " "[employer], from 1/6/2022 -- No obvious fractures or dislocations..    MRI left elbow 1/25/2023:  1. Moderate grade intrasubstance tearing of the common extensor tendon  at its proximal attachment, relatively similar to slightly improved  since comparison from 1/2022.  2. Mild strain of the supinator muscle.  3. Additional apparent mild edema of multiple muscle, may be due to  delayed onset of muscle soreness.    MRI left elbow 1/19/2022:  On the background of tendinosis, moderate-grade intrasubstance tear of the common extensor tendon at its proximal attachment.        Assessment: 57 year old male , right -hand dominant, with:  1)  chronic left elbow pain, strain following work injury, underlying lateral epicondylitis with partial tearing.  2)  Intermittent ulnar neuropathy          Plan:   * again, unclear as to why continues to have these intermittent pains, or \"flares\" he calls them  * sounds like a little improved from prior with recent round of therapy.    * I discussed referral to another elbow specialist through the [facility], Dr Marshall.  * at this time, he'd like to try to return to work. We will try a graduated return to work schedule, starting 4h/d x2 weeks, then 6h/d x2 weeks, the regular days, as able.       * recommend lifting with palm up, supination, as this will take some stress off the extensors.    * counterforce elbow brace/strap, taping as needed.  * wrist brace as needed.  * elbow massage and icing  * shoulder range of motion, gentle  * gentle neck range of motion.  * NDAIDS as needed.  *  acupuncture when able.        * return to clinic 8 weeks for clinical recheck.    * workability note. Return to work with restrictions as noted above with graduated return to work schedule, starting 5/8/2023.    * All questions were addressed and answered prior to discharge from clinic today. The patient acknowledges an understanding of and agreement with the plan set forth during today's visit. " Patient was advised to call our office or MyChart us if any further questions arise upon leaving our office today.        Jose Daniel Valenzuela M.D., M.S.  Dept. of Orthopaedic Surgery  [facility]

## 2023-05-03 ENCOUNTER — THERAPY VISIT (OUTPATIENT)
Dept: PHYSICAL THERAPY | Facility: CLINIC | Age: 58
End: 2023-05-03
Payer: OTHER MISCELLANEOUS

## 2023-05-03 DIAGNOSIS — G89.29 CHRONIC ELBOW PAIN, LEFT: Primary | ICD-10-CM

## 2023-05-03 DIAGNOSIS — M25.522 CHRONIC ELBOW PAIN, LEFT: Primary | ICD-10-CM

## 2023-05-03 PROCEDURE — 97110 THERAPEUTIC EXERCISES: CPT | Mod: GP | Performed by: PHYSICAL THERAPIST

## 2023-05-03 NOTE — PROGRESS NOTES
Subjective:  The history is provided by the patient. No  was used.     Physical Exam                    Objective:  System                   Shoulder Evaluation:  ROM:          Strength:    Flexion: Left:5/5 Strong/pain free    Pain:      Extension:  Left: 5/5  Strong/pain free    Pain:      Abduction:  Left: 5/5  Strong/pain free  Pain:        Internal Rotation:  Left:5/5   Strong/pain free    Pain:      External Rotation:   Left:5/5   Strong/pain free    Pain:                        ROM:  AROM:      Flexion Elbow:  Left:140     Extension Elbow:  Left: 0      Flexion Wrist:  Left: 58      Extension Wrist:  Left: 60      Supination Elbow/Wrist:  Left: WNL  Pronation Elbow/Wrist:  Left: WNL      Radial Deviation:  Left: WNL     Ulnar Deviation:  Left: 34        Strength:  Strength wnl elbow/wrist: Floor to counter lift 50#x5, no increase in pain.  Flexion Elbow:  Left: 5/5 Strong/pain free  Pain:    Right: 5/5 Strong/pain free  Pain:  Extension Elbow: Left: 5/5 Strong/pain free  Pain:    Right: 5/5 Strong/pain free  Pain:  Flexion Wrist:  Left: 5/5 strong/pain free Pain:    Right: 5/5 Strong/pain free  Pain:  Extension Wrist: Left: 5/5 Strong/pain free  Pain:  Right: 5/5 Strong/pain free  Pain:  Supination Elbow/Wrist: Left: 5/5 Strong/pain free  Pain:    Right: 5/5 Strong/pain free  Pain:  Pronation Elbow/Wrist: Left: 5/5 Strong/pain free  Pain:        Right: 5/5 Strong/pain free  Pain:  Radial Deviation:  Left: 5/5 Strong/pain free  Pain:    Right: 5/5 Strong/pain free  Pain:  Ulnar Deviation: Left: 5/5 Strong/pain free  Pain:    Right: 5/5 Strong/pain free  Pain:                                      General     ROS    Assessment/Plan:    DISCHARGE REPORT    Progress reporting period is from 3/9/23 to 5/3/23.       SUBJECTIVE  Subjective changes noted by patient: The patient notes that he has been able to perform his exercises consistently, he notes that increases in pain he notes from exercise  resolve by the following morning. He notes only minor pain with lifting activities, and states he feels ready to return to work without restrictions. Verbalizes agreement to continue a comprehensive home exercise routine for management of symptoms.         Current pain level is 1/10  .     Initial Pain level: 2/10.   Changes in function:  Yes (See Goal flowsheet attached for changes in current functional level)  Adverse reaction to treatment or activity: None    OBJECTIVE  Changes noted in objective findings:  Yes, see Physical exam        ASSESSMENT/PLAN  Updated problem list and treatment plan: Diagnosis 1:  Left elbow chronic pain  Pain -  home program  STG/LTGs have been met or progress has been made towards goals:  Yes (See Goal flow sheet completed today.)  Assessment of Progress: The patient has met all of their long term goals.  Self Management Plans:  Patient is independent in a home treatment program.  I have re-evaluated this patient and find that the nature, scope, duration and intensity of the therapy is appropriate for the medical condition of the patient.  Refugio continues to require the following intervention to meet STG and LTG's:  PT intervention is no longer required to meet STG/LTG.    Recommendations:  This patient is ready to be discharged from therapy and continue their home treatment program.    Please refer to the daily flowsheet for treatment today, total treatment time and time spent performing 1:1 timed codes.

## 2023-05-04 ENCOUNTER — OFFICE VISIT (OUTPATIENT)
Dept: ORTHOPEDICS | Facility: CLINIC | Age: 58
End: 2023-05-04
Payer: OTHER MISCELLANEOUS

## 2023-05-04 VITALS
HEIGHT: 73 IN | SYSTOLIC BLOOD PRESSURE: 132 MMHG | BODY MASS INDEX: 26.54 KG/M2 | DIASTOLIC BLOOD PRESSURE: 82 MMHG | WEIGHT: 200.3 LBS | HEART RATE: 123 BPM

## 2023-05-04 DIAGNOSIS — S59.902S ELBOW INJURY, LEFT, SEQUELA: ICD-10-CM

## 2023-05-04 DIAGNOSIS — M25.522 CHRONIC ELBOW PAIN, LEFT: Primary | ICD-10-CM

## 2023-05-04 DIAGNOSIS — M77.12 LATERAL EPICONDYLITIS, LEFT ELBOW: ICD-10-CM

## 2023-05-04 DIAGNOSIS — G89.29 CHRONIC ELBOW PAIN, LEFT: Primary | ICD-10-CM

## 2023-05-04 PROCEDURE — 99213 OFFICE O/P EST LOW 20 MIN: CPT | Performed by: ORTHOPAEDIC SURGERY

## 2023-05-04 ASSESSMENT — PAIN SCALES - GENERAL: PAINLEVEL: NO PAIN (1)

## 2023-05-04 NOTE — LETTER
"    5/4/2023         RE: Refugio Davies  [address]  [facility] [address]        Dear Colleague,    Thank you for referring your patient, Refugio Davies, to the [facility]. Please see a copy of my visit note below.    Chief Complaint:   Chief Complaint   Patient presents with     Left Elbow - RECHECK     W/C. Left elbow pain/epicondylitis. DOI 1/6/22. Seen [facility] on 3/2/23. Tenex procedure: 9/12/22 with [facility]. Since his visit with Dr. Jacob he has seen a therapist at [place], finished physical therapy yesterday. Patient notes his elbow is not to bad, occasional flare ups. Patient is not back to work.      DATE of INJURY: 1/6/2022      HPI: Refugio Davies is a 57 year old male , right -hand dominant, who presents for followup evaluation and management of a left elbow and forearm injury, now 16 months out from injury. Returns today doing ok. Has been going to therapy in [place], finished yesterday. Elbow overall not too bad, occasional \"flareups\" maybe once a day or every other day, last about 15 minutes. Occasional pain to the ulnar aspect of the hand.    The intermittent numbness and tingling seems to be less, but will still occur at times.  Still has some \"crunching\" which is not really painful.    He had a consult with an upper extremity elbow specialist at the [facility] (Dr Jacob) 10/20/2022, whom recommended continued nonsurgical management   until at least 1 year nonop management. He had a repeat visit with Dr Jacob 3/2/2023, whom still didn't recommend surgery but more therapy. He was referred to [place] for dry needling, but they didn't actually do that there, but did therapy nonetheless which helped some anyway.      Treatments:  TENEX 9/12/2022 with Sports Medicine - doesn't think helped at all.  PRP injection 4/5/2022 with Sports Medicine - helped initially but briefly  Cortisone injection 7/2022 - helped about 5 days.  A lot of " therapy.    INJURY:  1/6/2022 while at work, pulling a garbage can out of the snow and felt a pop and pain down the forearm to the hand. Worked through the day with the pain. Presented to the ED that night with xrays of the forearm and wrist negative.          He reports having mild pain/discomfort around the injury site. Intermittent numbness and tingling into the ring/small fingers, thumb.  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   Patient Active Problem List   Diagnosis     CARDIOVASCULAR SCREENING; LDL GOAL LESS THAN 160     Tobacco use disorder     Other idiopathic scoliosis, lumbar region     History of pneumothorax     Chronic elbow pain, left       Past surgical history:  has a past surgical history that includes Open reduction internal fixation rodding intramedullary femur (Right, 1997) and Lung surgery (Right, 2009).     Medications:   Current Outpatient Medications:      acetaminophen (TYLENOL) 500 MG tablet, Take 500-1,000 mg by mouth every 6 hours as needed for mild pain, Disp: , Rfl:      Ascorbic Acid (VITAMIN C) 500 MG CHEW, Take 2 tablets by mouth daily, Disp: , Rfl:      diclofenac (VOLTAREN) 1 % topical gel, Apply 2 g topically 4 times daily, Disp: 2 g, Rfl: 0     ibuprofen (ADVIL/MOTRIN) 200 MG tablet, Take 600 mg by mouth every 4 hours as needed for mild pain, Disp: , Rfl:      Lidocaine (LIDOCARE) 4 % Patch, Place 1 patch onto the skin every 24 hours To prevent lidocaine toxicity, patient should be patch free for 12 hrs daily., Disp: 30 patch, Rfl: 0     MULTIPLE VITAMIN PO, Take by mouth daily, Disp: , Rfl:      NEW MED, Vitapulse CoQ10, PPQ, Disp: ,  "Rfl:      Omega-3 Fatty Acids (FISH OIL) 1200 MG capsule, Take 1,200 mg by mouth daily, Disp: , Rfl:      vitamin D3 (CHOLECALCIFEROL) 50 mcg (2000 units) tablet, Take 2 tablets by mouth daily, Disp: , Rfl:     Allergies:   No Known Allergies     Family History: family history includes Breast Cancer in his mother; Diabetes in his maternal aunt; Thyroid Disease in his brother.     Social History:  reports that he has been smoking cigarettes. He has been smoking an average of .5 packs per day. He has never used smokeless tobacco. He reports current alcohol use. He reports that he does not use drugs.    Review of Systems:     Denies numbness, tingling, parasthesias.   Denies headaches.   Denies fevers, chills, night sweats   Denies chest pain.   Denies shortness of breath.   Denies any skin problems, abrasions, rashes, irritation.      Physical Exam  GENERAL APPEARANCE: healthy, alert, no distress. Accompanied by QRC  SKIN: no suspicious lesions or rashes  RESPIRATORY: No increased work of breathing.  NEURO: Normal strength and tone, mentation intact and speech normal  PSYCH:  mentation appears normal and affect normal. Not anxious.    /82   Pulse (!) 123   Ht 1.854 m (6' 1\")   Wt 90.9 kg (200 lb 4.8 oz)   BMI 26.43 kg/m         LEFT ELBOW:  Skin intact. No open wounds. No skin maceration.  Inspection: no swelling, no ecchymosis, no olecranon bursa swelling, no distal bicep tendon defect  Tender: slight  tender to palpation at lateral epicondyle, common extensor tendon    Slight tender to palpation medial epicondyle, flexor pronator mass.  Non-tender: supracondylar notch, olecranon bursa and distal bicep tendon  Range of Motion: all normal   Strength: elbow strength full  Slight  discomfort with resisted wrist extension, long finger extension.  No discomfort today with resisted wrist flexion, index finger extension.      [facility], 7/15/2022: Essentially normal study of the left " "upper extremity. Given the clinical symptoms, there is no compression of the ulnar nerve at the elbow. There is no electrophysiologic evidence of radiculopathy, plexopathy, neuropathy or myopathy.      X-rays: no new images today.  3 views left wrist, 2 views left forearm, [facility], from 1/6/2022 -- No obvious fractures or dislocations..    MRI left elbow 1/25/2023:  1. Moderate grade intrasubstance tearing of the common extensor tendon  at its proximal attachment, relatively similar to slightly improved  since comparison from 1/2022.  2. Mild strain of the supinator muscle.  3. Additional apparent mild edema of multiple muscle, may be due to  delayed onset of muscle soreness.    MRI left elbow 1/19/2022:  On the background of tendinosis, moderate-grade intrasubstance tear of the common extensor tendon at its proximal attachment.        Assessment: 57 year old male , right -hand dominant, with:  1)  chronic left elbow pain, strain following work injury, underlying lateral epicondylitis with partial tearing.  2)  Intermittent ulnar neuropathy          Plan:   * again, unclear as to why continues to have these intermittent pains, or \"flares\" he calls them  * sounds like a little improved from prior with recent round of therapy.    * I discussed referral to another elbow specialist through the [facility], Dr Marshall.  * at this time, he'd like to try to return to work. We will try a graduated return to work schedule, starting 4h/d x2 weeks, then 6h/d x2 weeks, the regular days, as able.       * recommend lifting with palm up, supination, as this will take some stress off the extensors.    * counterforce elbow brace/strap, taping as needed.  * wrist brace as needed.  * elbow massage and icing  * shoulder range of motion, gentle  * gentle neck range of motion.  * NDAIDS as needed.  *  acupuncture when able.        * return to clinic 8 weeks for clinical recheck.    * workability note. Return to work with " restrictions as noted above with graduated return to work schedule, starting 5/8/2023.    * All questions were addressed and answered prior to discharge from clinic today. The patient acknowledges an understanding of and agreement with the plan set forth during today's visit. Patient was advised to call our office or MyChart us if any further questions arise upon leaving our office today.        Jose Daniel Valenzuela M.D., M.S.  Dept. of Orthopaedic Surgery  [facility]        Again, thank you for allowing me to participate in the care of your patient.        Sincerely,        Jose Daniel Valenzuela MD

## 2023-05-04 NOTE — LETTER
May 4, 2023      Refugio Davies  [address] [address] [place] [address]          To whom it may concern:     Refugio Davies is under my care for   1. Chronic elbow pain, left         Work related injury: Yes       Date of injury: 1/6/22          Return to work date: 5/8/23     ** WITH RESTRICTIONS? Yes, with work restrictions: * Other: 4 hours a day for 2 weeks then 6 hours a day for 2 weeks then full duty, without restrictions. Please contact my office with any questions.         Next appointment: 8 weeks        Electronically Signed (as below)  Dr. Jose Daniel Valenzuela M.D.

## 2023-05-05 ENCOUNTER — MYC MEDICAL ADVICE (OUTPATIENT)
Dept: ORTHOPEDICS | Facility: CLINIC | Age: 58
End: 2023-05-05
Payer: COMMERCIAL

## 2023-05-08 NOTE — TELEPHONE ENCOUNTER
Called and spoke to patient. We will wait until he gets a call back from his . He will call once they discuss when he should go back to work.   Aleja Parker Certified Medical Assistant

## 2023-06-25 NOTE — PROGRESS NOTES
"Chief Complaint:   Chief Complaint   Patient presents with     Left Elbow - Follow Up     Is working now without restrictions, but notes that he has some increased soreness. Notes pain flaring throughout the day with picking things up that are heavy and extending the elbow,  pain/occasional tingling follows lateral forearm into pinky from medial elbow. Not on any pain medication. Notes cracking and popping.      DATE of INJURY: 1/6/2022      HPI: Refugio Davies is a 57 year old male , right -hand dominant, who presents for followup evaluation and management of a left elbow and forearm injury, now 17+ months out from injury. Returns today doing ok. Has been working without restrictions, and has noticed some increased soreness. He states where he's working now, at the \"[employer]\", is better and less strenuous than his route, but still has to handle appliances for takedown for recycling. He has help when needed though.    He still gets intermittent numbness and tingling down the forearm and into the small finger.  Still has some \"crunching\" \" popping\" which is not really painful.    He had an ERI, states bascially was told that surgery is likely option given continued symptoms now 1.5 years out from injury and a lot of nonsurgical treatment.      He had a consult with an upper extremity elbow specialist at the [facility] (Dr Jacob) 10/20/2022, whom recommended continued nonsurgical management   until at least 1 year nonop management. He had a repeat visit with Dr Jacob 3/2/2023, whom still didn't recommend surgery but more therapy. He was referred to [facility] needling, but they didn't actually do that there, but did therapy nonetheless which helped some anyway.      Treatments:  TENEX 9/12/2022 with Sports Medicine - doesn't think helped at all.  PRP injection 4/5/2022 with Sports Medicine - helped initially but briefly  Cortisone injection 7/2022 - helped about 5 days.  A lot of " therapy.    INJURY:  1/6/2022 while at work, pulling a garbage can out of the snow and felt a pop and pain down the forearm to the hand. Worked through the day with the pain. Presented to the ED that night with xrays of the forearm and wrist negative.          He reports having mild pain/discomfort around the injury site. Intermittent numbness and tingling into the ring/small fingers, thumb.  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   Patient Active Problem List   Diagnosis     CARDIOVASCULAR SCREENING; LDL GOAL LESS THAN 160     Tobacco use disorder     Other idiopathic scoliosis, lumbar region     History of pneumothorax     Chronic elbow pain, left       Past surgical history:  has a past surgical history that includes Open reduction internal fixation rodding intramedullary femur (Right, 1997) and Lung surgery (Right, 2009).     Medications:   Current Outpatient Medications:      acetaminophen (TYLENOL) 500 MG tablet, Take 500-1,000 mg by mouth every 6 hours as needed for mild pain, Disp: , Rfl:      Ascorbic Acid (VITAMIN C) 500 MG CHEW, Take 2 tablets by mouth daily, Disp: , Rfl:      diclofenac (VOLTAREN) 1 % topical gel, Apply 2 g topically 4 times daily, Disp: 2 g, Rfl: 0     ibuprofen (ADVIL/MOTRIN) 200 MG tablet, Take 600 mg by mouth every 4 hours as needed for mild pain, Disp: , Rfl:      Lidocaine (LIDOCARE) 4 % Patch, Place 1 patch onto the skin every 24 hours To prevent lidocaine toxicity, patient should be patch free for 12 hrs daily. (Patient not taking: Reported on 5/4/2023), Disp: 30 patch, Rfl: 0     MULTIPLE VITAMIN PO, Take by mouth daily, Disp: , Rfl:      NEW MED, Vitapulse  "CoQ10, PPQ, Disp: , Rfl:      Omega-3 Fatty Acids (FISH OIL) 1200 MG capsule, Take 1,200 mg by mouth daily, Disp: , Rfl:      vitamin D3 (CHOLECALCIFEROL) 50 mcg (2000 units) tablet, Take 2 tablets by mouth daily, Disp: , Rfl:     Allergies:   No Known Allergies     Family History: family history includes Breast Cancer in his mother; Diabetes in his maternal aunt; Thyroid Disease in his brother.     Social History:  reports that he has been smoking cigarettes. He has been smoking an average of .5 packs per day. He has never used smokeless tobacco. He reports current alcohol use. He reports that he does not use drugs.    Review of Systems:     Denies numbness, tingling, parasthesias.   Denies headaches.   Denies fevers, chills, night sweats   Denies chest pain.   Denies shortness of breath.   Denies any skin problems, abrasions, rashes, irritation.      Physical Exam  GENERAL APPEARANCE: healthy, alert, no distress. Accompanied by QRC  SKIN: no suspicious lesions or rashes  RESPIRATORY: No increased work of breathing.  NEURO: Normal strength and tone, mentation intact and speech normal  PSYCH:  mentation appears normal and affect normal. Not anxious.    BP (!) 164/84   Ht 1.899 m (6' 2.75\")   Wt 88.5 kg (195 lb)   BMI 24.54 kg/m         LEFT ELBOW:  Skin intact. No open wounds. No skin maceration.  Inspection: no swelling, no ecchymosis, no olecranon bursa swelling, no distal bicep tendon defect  Tender: moderate  tender to palpation at lateral epicondyle, common extensor tendon, extensors of the forearm    Mild tender to palpation medial epicondyle, flexor pronator mass.  Non-tender: supracondylar notch, olecranon bursa and distal bicep tendon  Range of Motion: all normal   Strength: elbow strength full  No discomfort with resisted wrist extension, long finger extension.  No discomfort today with resisted wrist flexion, index finger extension.  Positive Tinels over the ulnar nerve medial elbow.      EMG " [facility], 7/15/2022: Essentially normal study of the left upper extremity. Given the clinical symptoms, there is no compression of the ulnar nerve at the elbow. There is no electrophysiologic evidence of radiculopathy, plexopathy, neuropathy or myopathy.      X-rays: no new images today.  3 views left wrist, 2 views left forearm, [facility], from 1/6/2022 -- No obvious fractures or dislocations..    MRI left elbow 1/25/2023:  1. Moderate grade intrasubstance tearing of the common extensor tendon  at its proximal attachment, relatively similar to slightly improved  since comparison from 1/2022.  2. Mild strain of the supinator muscle.  3. Additional apparent mild edema of multiple muscle, may be due to  delayed onset of muscle soreness.    MRI left elbow 1/19/2022:  On the background of tendinosis, moderate-grade intrasubstance tear of the common extensor tendon at its proximal attachment.        Assessment: 57 year old male , right -hand dominant, with:  1)  chronic left elbow pain, strain following work injury, underlying lateral epicondylitis with partial tearing.  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          Plan:   * sounds like increased soreness since returning to work, but seems like he's overall doing ok.    * I discussed referral to another elbow specialist through the [facility], Dr Marshall, for another surgical opinion.  * at this time, he'd like to see Dr Marshall, referral placed.      * recommend lifting with palm up, supination, as this will take some stress off the extensors.  * counterforce elbow brace/strap, taping as needed.  * wrist brace as needed.  * elbow massage and icing  * shoulder range of motion, gentle  * gentle neck range of motion.  * NDAIDS as needed.  *  acupuncture as needed.    * return to clinic 8 weeks for clinical recheck.    * workability note. Return to work without restrictions in current job duty assignment ([employer]).    *  All questions were addressed and answered prior to discharge from clinic today. The patient acknowledges an understanding of and agreement with the plan set forth during today's visit. Patient was advised to call our office or MyChart us if any further questions arise upon leaving our office today.        Jose Daniel Valenzuela M.D., M.S.  Dept. of Orthopaedic Surgery  [facility]

## 2023-06-29 ENCOUNTER — OFFICE VISIT (OUTPATIENT)
Dept: ORTHOPEDICS | Facility: CLINIC | Age: 58
End: 2023-06-29
Payer: OTHER MISCELLANEOUS

## 2023-06-29 VITALS
WEIGHT: 195 LBS | HEIGHT: 75 IN | DIASTOLIC BLOOD PRESSURE: 84 MMHG | SYSTOLIC BLOOD PRESSURE: 164 MMHG | BODY MASS INDEX: 24.25 KG/M2

## 2023-06-29 DIAGNOSIS — G89.29 CHRONIC ELBOW PAIN, LEFT: ICD-10-CM

## 2023-06-29 DIAGNOSIS — G56.22 CUBITAL TUNNEL SYNDROME ON LEFT: ICD-10-CM

## 2023-06-29 DIAGNOSIS — M77.12 LATERAL EPICONDYLITIS OF LEFT ELBOW: Primary | ICD-10-CM

## 2023-06-29 DIAGNOSIS — S59.902S ELBOW INJURY, LEFT, SEQUELA: ICD-10-CM

## 2023-06-29 DIAGNOSIS — M25.522 CHRONIC ELBOW PAIN, LEFT: ICD-10-CM

## 2023-06-29 PROCEDURE — 99213 OFFICE O/P EST LOW 20 MIN: CPT | Performed by: ORTHOPAEDIC SURGERY

## 2023-06-29 NOTE — LETTER
June 29, 2023      Refugio Davies  [address]  [place] [address]        To Whom It May Concern:    Refugio Davies was seen in our clinic today. He can return to work without restrictions in his current job duty assignment ([employer]).    Return to clinic in 8 weeks.      Sincerely,        Philipp Odell PA-C, CAQ-OS  Dept. of Orthopedic Surgery  [facility]

## 2023-06-29 NOTE — LETTER
"    6/29/2023         RE: Refugio Davies  [address]  [facility] [address]        Dear Colleague,    Thank you for referring your patient, Refugio Davies, to the [facility] [place]. Please see a copy of my visit note below.    Chief Complaint:   Chief Complaint   Patient presents with     Left Elbow - Follow Up     Is working now without restrictions, but notes that he has some increased soreness. Notes pain flaring throughout the day with picking things up that are heavy and extending the elbow,  pain/occasional tingling follows lateral forearm into pinky from medial elbow. Not on any pain medication. Notes cracking and popping.      DATE of INJURY: 1/6/2022      HPI: Refugio Davies is a 57 year old male , right -hand dominant, who presents for followup evaluation and management of a left elbow and forearm injury, now 17+ months out from injury. Returns today doing ok. Has been working without restrictions, and has noticed some increased soreness. He states where he's working now, at the \"[employer]\", is better and less strenuous than his route, but still has to handle appliances for takedown for recycling. He has help when needed though.    He still gets intermittent numbness and tingling down the forearm and into the small finger.  Still has some \"crunching\" \" popping\" which is not really painful.    He had an ERI, states bascially was told that surgery is likely option given continued symptoms now 1.5 years out from injury and a lot of nonsurgical treatment.      He had a consult with an upper extremity elbow specialist at the [facility] (Dr Jacob) 10/20/2022, whom recommended continued nonsurgical management   until at least 1 year nonop management. He had a repeat visit with Dr Jacob 3/2/2023, whom still didn't recommend surgery but more therapy. He was referred to [facility] for dry needling, but they didn't actually do that there, but did therapy nonetheless which " helped some anyway.      Treatments:  TENEX 9/12/2022 with Sports Medicine - doesn't think helped at all.  PRP injection 4/5/2022 with Sports Medicine - helped initially but briefly  Cortisone injection 7/2022 - helped about 5 days.  A lot of therapy.    INJURY:  1/6/2022 while at work, pulling a garbage can out of the snow and felt a pop and pain down the forearm to the hand. Worked through the day with the pain. Presented to the ED that night with xrays of the forearm and wrist negative.          He reports having mild pain/discomfort around the injury site. Intermittent numbness and tingling into the ring/small fingers, thumb.  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   Patient Active Problem List   Diagnosis     CARDIOVASCULAR SCREENING; LDL GOAL LESS THAN 160     Tobacco use disorder     Other idiopathic scoliosis, lumbar region     History of pneumothorax     Chronic elbow pain, left       Past surgical history:  has a past surgical history that includes Open reduction internal fixation rodding intramedullary femur (Right, 1997) and Lung surgery (Right, 2009).     Medications:   Current Outpatient Medications:      acetaminophen (TYLENOL) 500 MG tablet, Take 500-1,000 mg by mouth every 6 hours as needed for mild pain, Disp: , Rfl:      Ascorbic Acid (VITAMIN C) 500 MG CHEW, Take 2 tablets by mouth daily, Disp: , Rfl:      diclofenac (VOLTAREN) 1 % topical gel, Apply 2 g topically 4 times daily, Disp: 2 g, Rfl: 0     ibuprofen (ADVIL/MOTRIN) 200 MG tablet, Take 600 mg by mouth every 4 hours as needed for mild pain, Disp: , Rfl:      Lidocaine (LIDOCARE) 4 % Patch, Place 1 patch onto the skin  "every 24 hours To prevent lidocaine toxicity, patient should be patch free for 12 hrs daily. (Patient not taking: Reported on 5/4/2023), Disp: 30 patch, Rfl: 0     MULTIPLE VITAMIN PO, Take by mouth daily, Disp: , Rfl:      NEW MED, Vitapulse CoQ10, PPQ, Disp: , Rfl:      Omega-3 Fatty Acids (FISH OIL) 1200 MG capsule, Take 1,200 mg by mouth daily, Disp: , Rfl:      vitamin D3 (CHOLECALCIFEROL) 50 mcg (2000 units) tablet, Take 2 tablets by mouth daily, Disp: , Rfl:     Allergies:   No Known Allergies     Family History: family history includes Breast Cancer in his mother; Diabetes in his maternal aunt; Thyroid Disease in his brother.     Social History:  reports that he has been smoking cigarettes. He has been smoking an average of .5 packs per day. He has never used smokeless tobacco. He reports current alcohol use. He reports that he does not use drugs.    Review of Systems:     Denies numbness, tingling, parasthesias.   Denies headaches.   Denies fevers, chills, night sweats   Denies chest pain.   Denies shortness of breath.   Denies any skin problems, abrasions, rashes, irritation.      Physical Exam  GENERAL APPEARANCE: healthy, alert, no distress. Accompanied by QRC  SKIN: no suspicious lesions or rashes  RESPIRATORY: No increased work of breathing.  NEURO: Normal strength and tone, mentation intact and speech normal  PSYCH:  mentation appears normal and affect normal. Not anxious.    BP (!) 164/84   Ht 1.899 m (6' 2.75\")   Wt 88.5 kg (195 lb)   BMI 24.54 kg/m         LEFT ELBOW:  Skin intact. No open wounds. No skin maceration.  Inspection: no swelling, no ecchymosis, no olecranon bursa swelling, no distal bicep tendon defect  Tender: moderate  tender to palpation at lateral epicondyle, common extensor tendon, extensors of the forearm    Mild tender to palpation medial epicondyle, flexor pronator mass.  Non-tender: supracondylar notch, olecranon bursa and distal bicep tendon  Range of Motion: all normal "   Strength: elbow strength full  No discomfort with resisted wrist extension, long finger extension.  No discomfort today with resisted wrist flexion, index finger extension.  Positive Tinels over the ulnar nerve medial elbow.      EMG [facility], 7/15/2022: Essentially normal study of the left upper extremity. Given the clinical symptoms, there is no compression of the ulnar nerve at the elbow. There is no electrophysiologic evidence of radiculopathy, plexopathy, neuropathy or myopathy.      X-rays: no new images today.  3 views left wrist, 2 views left forearm, [facility], from 1/6/2022 -- No obvious fractures or dislocations..    MRI left elbow 1/25/2023:  1. Moderate grade intrasubstance tearing of the common extensor tendon  at its proximal attachment, relatively similar to slightly improved  since comparison from 1/2022.  2. Mild strain of the supinator muscle.  3. Additional apparent mild edema of multiple muscle, may be due to  delayed onset of muscle soreness.    MRI left elbow 1/19/2022:  On the background of tendinosis, moderate-grade intrasubstance tear of the common extensor tendon at its proximal attachment.        Assessment: 57 year old male , right -hand dominant, with:  1)  chronic left elbow pain, strain following work injury, underlying lateral epicondylitis with partial tearing.  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          Plan:   * sounds like increased soreness since returning to work, but seems like he's overall doing ok.    * I discussed referral to another elbow specialist through the [facility], Dr Marshall, for another surgical opinion.  * at this time, he'd like to see Dr Marshall, referral placed.      * recommend lifting with palm up, supination, as this will take some stress off the extensors.  * counterforce elbow brace/strap, taping as needed.  * wrist brace as needed.  * elbow massage and icing  * shoulder range of motion,  gentle  * gentle neck range of motion.  * NDAIDS as needed.  *  acupuncture as needed.    * return to clinic 8 weeks for clinical recheck.    * workability note. Return to work without restrictions in current job duty assignment ([employer]).    * All questions were addressed and answered prior to discharge from clinic today. The patient acknowledges an understanding of and agreement with the plan set forth during today's visit. Patient was advised to call our office or MyChart us if any further questions arise upon leaving our office today.        Jose Daniel Valenzuela M.D., M.S.  Dept. of Orthopaedic Surgery  [facility]        Again, thank you for allowing me to participate in the care of your patient.        Sincerely,        Jose Daniel Valenzuela MD

## 2023-08-01 ENCOUNTER — OFFICE VISIT (OUTPATIENT)
Dept: ORTHOPEDICS | Facility: CLINIC | Age: 58
End: 2023-08-01
Attending: PHYSICIAN ASSISTANT
Payer: OTHER MISCELLANEOUS

## 2023-08-01 VITALS
WEIGHT: 190 LBS | DIASTOLIC BLOOD PRESSURE: 70 MMHG | SYSTOLIC BLOOD PRESSURE: 138 MMHG | HEIGHT: 74 IN | BODY MASS INDEX: 24.38 KG/M2

## 2023-08-01 DIAGNOSIS — G89.29 CHRONIC ELBOW PAIN, LEFT: ICD-10-CM

## 2023-08-01 DIAGNOSIS — M77.12 LATERAL EPICONDYLITIS OF LEFT ELBOW: ICD-10-CM

## 2023-08-01 DIAGNOSIS — M25.522 CHRONIC ELBOW PAIN, LEFT: ICD-10-CM

## 2023-08-01 DIAGNOSIS — S59.902S ELBOW INJURY, LEFT, SEQUELA: ICD-10-CM

## 2023-08-01 PROCEDURE — 99215 OFFICE O/P EST HI 40 MIN: CPT | Performed by: STUDENT IN AN ORGANIZED HEALTH CARE EDUCATION/TRAINING PROGRAM

## 2023-08-01 NOTE — LETTER
August 1, 2023      Refugio Davies  [address]  [place] [address]        To Whom It May Concern:    Refugio Davies was seen in our clinic. He may return to work without restrictions in his current job duty assignment ([employer]) until surgery. Surgery date is to be determined at this time.       Sincerely,        Driss Marshall MD
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
## 2023-08-03 ENCOUNTER — TELEPHONE (OUTPATIENT)
Dept: ORTHOPEDICS | Facility: CLINIC | Age: 58
End: 2023-08-03

## 2023-08-03 PROBLEM — M77.12 LATERAL EPICONDYLITIS OF LEFT ELBOW: Status: ACTIVE | Noted: 2023-08-01

## 2023-08-03 NOTE — TELEPHONE ENCOUNTER
Patient has been scheduled for surgery. Details are below.    Date of Surgery: 09/14/23    Approximate Arrival Time: surgery center will call 2/3 days prior to confirm    Surgeon:  Dr. Marshall     Procedure: [facility], [address], [facility] [address]  Location: [facility] [place] [address]  Surgery Consult: na  PreOp Physical: 09/11/23  PostOp: 09/26/23  Packet Mailed/MyChart Sent: yes  Added to [place]: yes

## 2023-08-07 ENCOUNTER — TELEPHONE (OUTPATIENT)
Dept: ORTHOPEDICS | Facility: CLINIC | Age: 58
End: 2023-08-07

## 2023-08-07 NOTE — TELEPHONE ENCOUNTER
[facility] Call Center    Phone Message    May a detailed message be left on voicemail: yes     Reason for Call: Other: Pt is having surgery for a WC injury the .  Prior Authorization request.      can be reached at [phone] Hortensia Kaur     Action Taken: Other: Bu Ortho     Travel Screening: Not Applicable

## 2023-08-07 NOTE — TELEPHONE ENCOUNTER
Called , Hortensia Kaur, to understand what information is needed for the WC injury.    Left brief VM.    Reva Snow ATC

## 2023-08-11 NOTE — TELEPHONE ENCOUNTER
Left 2nd message for Hortensia, work comp  asking what she is needing specifically regarding a PA for surgery.     KELSEA Patel RN

## 2023-08-14 NOTE — TELEPHONE ENCOUNTER
Received faxed message from Hortensia Kaur today requesting Dr. Marshall's visit note from 8/1/23 and the order for surgery for her to review and approve the prior authorization for worker's comp purposes. Requested documents were printed off, faxed to Hortensia at [phone] and the combined packet of documents were sent to maya Flores, Visit Facilitator

## 2023-09-11 ENCOUNTER — OFFICE VISIT (OUTPATIENT)
Dept: FAMILY MEDICINE | Facility: CLINIC | Age: 58
End: 2023-09-11
Payer: OTHER MISCELLANEOUS

## 2023-09-11 VITALS
WEIGHT: 189 LBS | TEMPERATURE: 98.2 F | HEART RATE: 119 BPM | SYSTOLIC BLOOD PRESSURE: 156 MMHG | HEIGHT: 74 IN | RESPIRATION RATE: 16 BRPM | BODY MASS INDEX: 24.26 KG/M2 | OXYGEN SATURATION: 98 % | DIASTOLIC BLOOD PRESSURE: 60 MMHG

## 2023-09-11 DIAGNOSIS — Z12.5 SCREENING FOR PROSTATE CANCER: ICD-10-CM

## 2023-09-11 DIAGNOSIS — Z28.21 VACCINATION NOT CARRIED OUT BECAUSE OF PATIENT REFUSAL: ICD-10-CM

## 2023-09-11 DIAGNOSIS — F17.200 TOBACCO USE DISORDER: ICD-10-CM

## 2023-09-11 DIAGNOSIS — Z87.891 PERSONAL HISTORY OF TOBACCO USE: ICD-10-CM

## 2023-09-11 DIAGNOSIS — M77.12 LATERAL EPICONDYLITIS OF LEFT ELBOW: ICD-10-CM

## 2023-09-11 DIAGNOSIS — R03.0 ELEVATED BLOOD PRESSURE READING WITHOUT DIAGNOSIS OF HYPERTENSION: ICD-10-CM

## 2023-09-11 DIAGNOSIS — Z13.220 SCREENING FOR LIPID DISORDERS: ICD-10-CM

## 2023-09-11 DIAGNOSIS — Z01.818 PREOP GENERAL PHYSICAL EXAM: Primary | ICD-10-CM

## 2023-09-11 DIAGNOSIS — Z12.11 SCREEN FOR COLON CANCER: ICD-10-CM

## 2023-09-11 LAB
ANION GAP SERPL CALCULATED.3IONS-SCNC: 11 MMOL/L (ref 7–15)
BUN SERPL-MCNC: 13.2 MG/DL (ref 6–20)
CALCIUM SERPL-MCNC: 10 MG/DL (ref 8.6–10)
CHLORIDE SERPL-SCNC: 103 MMOL/L (ref 98–107)
CHOLEST SERPL-MCNC: 215 MG/DL
CREAT SERPL-MCNC: 0.97 MG/DL (ref 0.67–1.17)
DEPRECATED HCO3 PLAS-SCNC: 26 MMOL/L (ref 22–29)
EGFRCR SERPLBLD CKD-EPI 2021: >90 ML/MIN/1.73M2
GLUCOSE SERPL-MCNC: 92 MG/DL (ref 70–99)
HDLC SERPL-MCNC: 41 MG/DL
LDLC SERPL CALC-MCNC: 136 MG/DL
NONHDLC SERPL-MCNC: 174 MG/DL
POTASSIUM SERPL-SCNC: 4.6 MMOL/L (ref 3.4–5.3)
PSA SERPL DL<=0.01 NG/ML-MCNC: 0.62 NG/ML (ref 0–3.5)
SODIUM SERPL-SCNC: 140 MMOL/L (ref 136–145)
TRIGL SERPL-MCNC: 188 MG/DL

## 2023-09-11 PROCEDURE — 80061 LIPID PANEL: CPT | Performed by: FAMILY MEDICINE

## 2023-09-11 PROCEDURE — 36415 COLL VENOUS BLD VENIPUNCTURE: CPT | Performed by: FAMILY MEDICINE

## 2023-09-11 PROCEDURE — 99406 BEHAV CHNG SMOKING 3-10 MIN: CPT | Performed by: FAMILY MEDICINE

## 2023-09-11 PROCEDURE — G0103 PSA SCREENING: HCPCS | Performed by: FAMILY MEDICINE

## 2023-09-11 PROCEDURE — 80048 BASIC METABOLIC PNL TOTAL CA: CPT | Performed by: FAMILY MEDICINE

## 2023-09-11 PROCEDURE — G0296 VISIT TO DETERM LDCT ELIG: HCPCS | Performed by: FAMILY MEDICINE

## 2023-09-11 PROCEDURE — 99214 OFFICE O/P EST MOD 30 MIN: CPT | Performed by: FAMILY MEDICINE

## 2023-09-11 RX ORDER — CALCIUM CARBONATE/VITAMIN D3 500-10/5ML
1 LIQUID (ML) ORAL 2 TIMES DAILY
COMMUNITY

## 2023-09-11 ASSESSMENT — PAIN SCALES - GENERAL: PAINLEVEL: NO PAIN (0)

## 2023-09-11 NOTE — PROGRESS NOTES
Lung Cancer Screening Shared Decision Making Visit     Refugio Davies, a 58 year old male, is eligible for lung cancer screening    History   Smoking Status    Every Day    Packs/day: 0.50    Types: Cigarettes   Smokeless Tobacco    Never       I have discussed with patient the risks and benefits of screening for lung cancer with low-dose CT.     The risks include:    radiation exposure: one low dose chest CT has as much ionizing radiation as about 15 chest x-rays, or 6 months of background radiation living in [place]      false positives: most findings/nodules are NOT cancer, but some might still require additional diagnostic evaluation, including biopsy    over-diagnosis: some slow growing cancers that might never have been clinically significant will be detected and treated unnecessarily     The benefit of early detection of lung cancer is contingent upon adherence to annual screening or more frequent follow up if indicated.     Furthermore, to benefit from screening, Refugio must be willing and able to undergo diagnostic procedures, if indicated. Although no specific guide is available for determining severity of comorbidities, it is reasonable to withhold screening in patients who have greater mortality risk from other diseases.     We did discuss that the best way to prevent lung cancer is to not smoke.    Some patients may value a numeric estimation of lung cancer risk when evaluating if lung cancer screening is right for them, here is one calculator:    ShouldIScreen

## 2023-09-11 NOTE — Clinical Note
Please mail a copy of the [employer] [place] handout (ask MA for a copy of the advanced directive info).
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
## 2023-09-11 NOTE — PATIENT INSTRUCTIONS
For informational purposes only. Not to replace the advice of your health care provider. Copyright   2003,  [facility]. All rights reserved. Clinically reviewed by Marlene Etienne MD. [employer] [id number] - REV .  Preparing for Your Surgery  Getting started  A nurse will call you to review your health history and instructions. They will give you an arrival time based on your scheduled surgery time. Please be ready to share:  Your doctor's clinic name and phone number  Your medical, surgical, and anesthesia history  A list of allergies and sensitivities  A list of medicines, including herbal treatments and over-the-counter drugs  Whether the patient has a legal guardian (ask how to send us the papers in advance)  Please tell us if you're pregnant--or if there's any chance you might be pregnant. Some surgeries may injure a fetus (unborn baby), so they require a pregnancy test. Surgeries that are safe for a fetus don't always need a test, and you can choose whether to have one.   If you have a child who's having surgery, please ask for a copy of Preparing for Your Child's Surgery.    Preparing for surgery  Within 10 to 30 days of surgery: Have a pre-op exam (sometimes called an H&P, or History and Physical). This can be done at a clinic or pre-operative center.  If you're having a , you may not need this exam. Talk to your care team.  At your pre-op exam, talk to your care team about all medicines you take. If you need to stop any medicines before surgery, ask when to start taking them again.  We do this for your safety. Many medicines can make you bleed too much during surgery. Some change how well surgery (anesthesia) drugs work.  Call your insurance company to let them know you're having surgery. (If you don't have insurance, call [phone].)  Call your clinic if there's any change in your health. This includes signs of a cold or flu (sore throat, runny nose, cough, rash, fever). It also  includes a scrape or scratch near the surgery site.  If you have questions on the day of surgery, call your hospital or surgery center.  Eating and drinking guidelines  For your safety: Unless your surgeon tells you otherwise, follow the guidelines below.  Eat and drink as usual until 8 hours before you arrive for surgery. After that, no food or milk.  Drink clear liquids until 2 hours before you arrive. These are liquids you can see through, like water, Gatorade, and Propel Water. They also include plain black coffee and tea (no cream or milk), candy, and breath mints. You can spit out gum when you arrive.  If you drink alcohol: Stop drinking it the night before surgery.  If your care team tells you to take medicine on the morning of surgery, it's okay to take it with a sip of water.  Preventing infection  Shower or bathe the night before and morning of your surgery. Follow the instructions your clinic gave you. (If no instructions, use regular soap.)  Don't shave or clip hair near your surgery site. We'll remove the hair if needed.  Don't smoke or vape the morning of surgery. You may chew nicotine gum up to 2 hours before surgery. A nicotine patch is okay.  Note: Some surgeries require you to completely quit smoking and nicotine. Check with your surgeon.  Your care team will make every effort to keep you safe from infection. We will:  Clean our hands often with soap and water (or an alcohol-based hand rub).  Clean the skin at your surgery site with a special soap that kills germs.  Give you a special gown to keep you warm. (Cold raises the risk of infection.)  Wear special hair covers, masks, gowns and gloves during surgery.  Give antibiotic medicine, if prescribed. Not all surgeries need antibiotics.  What to bring on the day of surgery  Photo ID and insurance card  Copy of your health care directive, if you have one  Glasses and hearing aids (bring cases)  You can't wear contacts during surgery  Inhaler and eye  drops, if you use them (tell us about these when you arrive)  CPAP machine or breathing device, if you use them  A few personal items, if spending the night  If you have . . .  A pacemaker, ICD (cardiac defibrillator) or other implant: Bring the ID card.  An implanted stimulator: Bring the remote control.  A legal guardian: Bring a copy of the certified (court-stamped) guardianship papers.  Please remove any jewelry, including body piercings. Leave jewelry and other valuables at home.  If you're going home the day of surgery  You must have a responsible adult drive you home. They should stay with you overnight as well.  If you don't have someone to stay with you, and you aren't safe to go home alone, we may keep you overnight. Insurance often won't pay for this.  After surgery  If it's hard to control your pain or you need more pain medicine, please call your surgeon's office.  Questions?   If you have any questions for your care team, list them here: _________________________________________________________________________________________________________________________________________________________________________ ____________________________________ ____________________________________ ____________________________________    How to Take Your Medication Before Surgery  - STOP taking all vitamins and herbal supplements 14 days before surgery.    Please call [facility]: [phone] ([place] or [place]) or [facility]: [phone] ([facility]) to schedule your lung CT scan.   Lung Cancer Screening   Frequently Asked Questions  If you are at high-risk for lung cancer, getting screened with low-dose computed tomography (LDCT) every year can help save your life. This handout offers answers to some of the most common questions about lung cancer screening. If you have other questions, please call [phone] ([phone]).     What is it?  Lung cancer  screening uses special X-ray technology to create an image of your lung tissue. The exam is quick and easy and takes less than 10 seconds. We don t give you any medicine or use any needles. You can eat before and after the exam. You don t need to change your clothes as long as the clothing on your chest doesn t contain metal. But, you do need to be able to hold your breath for at least 6 seconds during the exam.    What is the goal of lung cancer screening?  The goal of lung cancer screening is to save lives. Many times, lung cancer is not found until a person starts having physical symptoms. Lung cancer screening can help detect lung cancer in the earliest stages when it may be easier to treat.    Who should be screened for lung cancer?  We suggest lung cancer screening for anyone who is at high-risk for lung cancer. You are in the high-risk group if you:      are between the ages of 55 and 79, and    have smoked at least 1 pack of cigarettes a day for 20 or more years, and    still smoke or have quit within the past 15 years.    However, if you have a new cough or shortness of breath, you should talk to your doctor before being screened.    Why does it matter if I have symptoms?  Certain symptoms can be a sign that you have a condition in your lungs that should be checked and treated by your doctor. These symptoms include fever, chest pain, a new or changing cough, shortness of breath that you have never felt before, coughing up blood or unexplained weight loss. Having any of these symptoms can greatly affect the results of lung cancer screening.       Should all smokers get an LDCT lung cancer screening exam?  It depends. Lung cancer screening is for a very specific group of men and women who have a history of heavy smoking over a long period of time (see  Who should be screened for lung cancer  above).  I am in the high-risk group, but have been diagnosed with cancer in the past. Is LDCT lung cancer screening  right for me?  In some cases, you should not have LDCT lung screening, such as when your doctor is already following your cancer with CT scan studies. Your doctor will help you decide if LDCT lung screening is right for you.  Do I need to have a screening exam every year?  Yes. If you are in the high-risk group described earlier, you should get an LDCT lung cancer screening exam every year until you are 79, or are no longer willing or able to undergo screening and possible procedures to diagnose and treat lung cancer.  How effective is LDCT at preventing death from lung cancer?  Studies have shown that LDCT lung cancer screening can lower the risk of death from lung cancer by 20 percent in people who are at high-risk.  What are the risks?  There are some risks and limitations of LDCT lung cancer screening. We want to make sure you understand the risks and benefits, so please let us know if you have any questions. Your doctor may want to talk with you more about these risks.    Radiation exposure: As with any exam that uses radiation, there is a very small increased risk of cancer. The amount of radiation in LDCT is small--about the same amount a person would get from a mammogram. Your doctor orders the exam when he or she feels the potential benefits outweigh the risks.    False negatives: No test is perfect, including LDCT. It is possible that you may have a medical condition, including lung cancer, that is not found during your exam. This is called a false negative result.    False positives and more testing: LDCT very often finds something in the lung that could be cancer, but in fact is not. This is called a false positive result. False positive tests often cause anxiety. To make sure these findings are not cancer, you may need to have more tests. These tests will be done only if you give us permission. Sometimes patients need a treatment that can have side effects, such as a biopsy. For more information on  false positives, see  What can I expect from the results?     Findings not related to lung cancer: Your LDCT exam also takes pictures of areas of your body next to your lungs. In a very small number of cases, the CT scan will show an abnormal finding in one of these areas, such as your kidneys, adrenal glands, liver or thyroid. This finding may not be serious, but you may need more tests. Your doctor can help you decide what other tests you may need, if any.  What can I expect from the results?  About 1 out of 4 LDCT exams will find something that may need more tests. Most of the time, these findings are lung nodules. Lung nodules are very small collections of tissue in the lung. These nodules are very common, and the vast majority--more than 97 percent--are not cancer (benign). Most are normal lymph nodes or small areas of scarring from past infections.  But, if a small lung nodule is found to be cancer, the cancer can be cured more than 90 percent of the time. To know if the nodule is cancer, we may need to get more images before your next yearly screening exam. If the nodule has suspicious features (for example, it is large, has an odd shape or grows over time), we will refer you to a specialist for further testing.  Will my doctor also get the results?  Yes. Your doctor will get a copy of your results.  Is it okay to keep smoking now that there s a cancer screening exam?  No. Tobacco is one of the strongest cancer-causing agents. It causes not only lung cancer, but other cancers and cardiovascular (heart) diseases as well. The damage caused by smoking builds over time. This means that the longer you smoke, the higher your risk of disease. While it is never too late to quit, the sooner you quit, the better.  Where can I find help to quit smoking?  The best way to prevent lung cancer is to stop smoking. If you have already quit smoking, congratulations and keep it up! For help on quitting smoking, please call  [employer] at 3-909-QUIT-NOW (1-286.215.2348) or the American [employer] Society at 1-310.809.4363 to find local resources near you.  One-on-one health coaching:  If you d prefer to work individually with a health care provider on tobacco cessation, we offer:      Medication Therapy Management:  Our specially trained pharmacists work closely with you and your doctor to help you quit smoking.  Call [phone] or [phone] (toll free).

## 2023-09-13 ENCOUNTER — ANESTHESIA EVENT (OUTPATIENT)
Dept: SURGERY | Facility: AMBULATORY SURGERY CENTER | Age: 58
End: 2023-09-13
Payer: OTHER MISCELLANEOUS

## 2023-09-13 LAB — HEMOCCULT STL QL IA: NEGATIVE

## 2023-09-13 PROCEDURE — 82274 ASSAY TEST FOR BLOOD FECAL: CPT | Performed by: FAMILY MEDICINE

## 2023-09-14 ENCOUNTER — ANESTHESIA (OUTPATIENT)
Dept: SURGERY | Facility: AMBULATORY SURGERY CENTER | Age: 58
End: 2023-09-14
Payer: OTHER MISCELLANEOUS

## 2023-09-14 ENCOUNTER — HOSPITAL ENCOUNTER (OUTPATIENT)
Facility: AMBULATORY SURGERY CENTER | Age: 58
Discharge: HOME | End: 2023-09-14
Attending: STUDENT IN AN ORGANIZED HEALTH CARE EDUCATION/TRAINING PROGRAM | Admitting: STUDENT IN AN ORGANIZED HEALTH CARE EDUCATION/TRAINING PROGRAM
Payer: OTHER MISCELLANEOUS

## 2023-09-14 VITALS
BODY MASS INDEX: 23.74 KG/M2 | OXYGEN SATURATION: 98 % | RESPIRATION RATE: 16 BRPM | HEART RATE: 74 BPM | DIASTOLIC BLOOD PRESSURE: 61 MMHG | TEMPERATURE: 97 F | SYSTOLIC BLOOD PRESSURE: 108 MMHG | HEIGHT: 74 IN | WEIGHT: 185 LBS

## 2023-09-14 DIAGNOSIS — M77.12 LATERAL EPICONDYLITIS OF LEFT ELBOW: Primary | ICD-10-CM

## 2023-09-14 PROCEDURE — 24358 REPAIR ELBOW W/DEB OPEN: CPT | Mod: LT

## 2023-09-14 RX ORDER — ONDANSETRON 4 MG/1
4 TABLET, ORALLY DISINTEGRATING ORAL EVERY 30 MIN PRN
Status: DISCONTINUED | OUTPATIENT
Start: 2023-09-14 | End: 2023-09-15 | Stop reason: HOSPADM

## 2023-09-14 RX ORDER — OXYCODONE HYDROCHLORIDE 5 MG/1
5 TABLET ORAL
Status: DISCONTINUED | OUTPATIENT
Start: 2023-09-14 | End: 2023-09-15 | Stop reason: HOSPADM

## 2023-09-14 RX ORDER — ONDANSETRON 4 MG/1
4 TABLET, ORALLY DISINTEGRATING ORAL EVERY 30 MIN PRN
Status: DISCONTINUED | OUTPATIENT
Start: 2023-09-14 | End: 2023-09-14 | Stop reason: HOSPADM

## 2023-09-14 RX ORDER — ONDANSETRON 2 MG/ML
4 INJECTION INTRAMUSCULAR; INTRAVENOUS EVERY 30 MIN PRN
Status: DISCONTINUED | OUTPATIENT
Start: 2023-09-14 | End: 2023-09-14 | Stop reason: HOSPADM

## 2023-09-14 RX ORDER — PROPOFOL 10 MG/ML
INJECTION, EMULSION INTRAVENOUS CONTINUOUS PRN
Status: DISCONTINUED | OUTPATIENT
Start: 2023-09-14 | End: 2023-09-14

## 2023-09-14 RX ORDER — FENTANYL CITRATE 50 UG/ML
25-50 INJECTION, SOLUTION INTRAMUSCULAR; INTRAVENOUS
Status: DISCONTINUED | OUTPATIENT
Start: 2023-09-14 | End: 2023-09-14 | Stop reason: HOSPADM

## 2023-09-14 RX ORDER — CEFAZOLIN SODIUM 2 G/50ML
2 SOLUTION INTRAVENOUS SEE ADMIN INSTRUCTIONS
Status: DISCONTINUED | OUTPATIENT
Start: 2023-09-14 | End: 2023-09-14 | Stop reason: HOSPADM

## 2023-09-14 RX ORDER — BUPIVACAINE HYDROCHLORIDE 5 MG/ML
INJECTION, SOLUTION EPIDURAL; INTRACAUDAL
Status: COMPLETED | OUTPATIENT
Start: 2023-09-14 | End: 2023-09-14

## 2023-09-14 RX ORDER — OXYCODONE HYDROCHLORIDE 5 MG/1
5-10 TABLET ORAL EVERY 4 HOURS PRN
Qty: 10 TABLET | Refills: 0 | Status: SHIPPED | OUTPATIENT
Start: 2023-09-14 | End: 2023-12-14

## 2023-09-14 RX ORDER — HYDROMORPHONE HYDROCHLORIDE 1 MG/ML
0.2 INJECTION, SOLUTION INTRAMUSCULAR; INTRAVENOUS; SUBCUTANEOUS EVERY 5 MIN PRN
Status: DISCONTINUED | OUTPATIENT
Start: 2023-09-14 | End: 2023-09-14 | Stop reason: HOSPADM

## 2023-09-14 RX ORDER — LIDOCAINE HYDROCHLORIDE 20 MG/ML
INJECTION, SOLUTION INFILTRATION; PERINEURAL PRN
Status: DISCONTINUED | OUTPATIENT
Start: 2023-09-14 | End: 2023-09-14

## 2023-09-14 RX ORDER — OXYCODONE HYDROCHLORIDE 5 MG/1
10 TABLET ORAL
Status: DISCONTINUED | OUTPATIENT
Start: 2023-09-14 | End: 2023-09-15 | Stop reason: HOSPADM

## 2023-09-14 RX ORDER — FENTANYL CITRATE 50 UG/ML
50 INJECTION, SOLUTION INTRAMUSCULAR; INTRAVENOUS EVERY 5 MIN PRN
Status: DISCONTINUED | OUTPATIENT
Start: 2023-09-14 | End: 2023-09-14 | Stop reason: HOSPADM

## 2023-09-14 RX ORDER — FLUMAZENIL 0.1 MG/ML
0.2 INJECTION, SOLUTION INTRAVENOUS
Status: DISCONTINUED | OUTPATIENT
Start: 2023-09-14 | End: 2023-09-14 | Stop reason: HOSPADM

## 2023-09-14 RX ORDER — NALOXONE HYDROCHLORIDE 0.4 MG/ML
0.2 INJECTION, SOLUTION INTRAMUSCULAR; INTRAVENOUS; SUBCUTANEOUS
Status: DISCONTINUED | OUTPATIENT
Start: 2023-09-14 | End: 2023-09-14 | Stop reason: HOSPADM

## 2023-09-14 RX ORDER — PROPOFOL 10 MG/ML
INJECTION, EMULSION INTRAVENOUS PRN
Status: DISCONTINUED | OUTPATIENT
Start: 2023-09-14 | End: 2023-09-14

## 2023-09-14 RX ORDER — HYDROMORPHONE HYDROCHLORIDE 1 MG/ML
0.4 INJECTION, SOLUTION INTRAMUSCULAR; INTRAVENOUS; SUBCUTANEOUS EVERY 5 MIN PRN
Status: DISCONTINUED | OUTPATIENT
Start: 2023-09-14 | End: 2023-09-14 | Stop reason: HOSPADM

## 2023-09-14 RX ORDER — LIDOCAINE 40 MG/G
CREAM TOPICAL
Status: DISCONTINUED | OUTPATIENT
Start: 2023-09-14 | End: 2023-09-14 | Stop reason: HOSPADM

## 2023-09-14 RX ORDER — ACETAMINOPHEN 325 MG/1
650 TABLET ORAL
Status: DISCONTINUED | OUTPATIENT
Start: 2023-09-14 | End: 2023-09-15 | Stop reason: HOSPADM

## 2023-09-14 RX ORDER — CEFAZOLIN SODIUM 2 G/50ML
2 SOLUTION INTRAVENOUS
Status: COMPLETED | OUTPATIENT
Start: 2023-09-14 | End: 2023-09-14

## 2023-09-14 RX ORDER — SODIUM CHLORIDE, SODIUM LACTATE, POTASSIUM CHLORIDE, CALCIUM CHLORIDE 600; 310; 30; 20 MG/100ML; MG/100ML; MG/100ML; MG/100ML
INJECTION, SOLUTION INTRAVENOUS CONTINUOUS
Status: DISCONTINUED | OUTPATIENT
Start: 2023-09-14 | End: 2023-09-14 | Stop reason: HOSPADM

## 2023-09-14 RX ORDER — NALOXONE HYDROCHLORIDE 0.4 MG/ML
0.4 INJECTION, SOLUTION INTRAMUSCULAR; INTRAVENOUS; SUBCUTANEOUS
Status: DISCONTINUED | OUTPATIENT
Start: 2023-09-14 | End: 2023-09-14 | Stop reason: HOSPADM

## 2023-09-14 RX ORDER — FENTANYL CITRATE 50 UG/ML
25 INJECTION, SOLUTION INTRAMUSCULAR; INTRAVENOUS EVERY 5 MIN PRN
Status: DISCONTINUED | OUTPATIENT
Start: 2023-09-14 | End: 2023-09-14 | Stop reason: HOSPADM

## 2023-09-14 RX ORDER — ACETAMINOPHEN 325 MG/1
975 TABLET ORAL ONCE
Status: COMPLETED | OUTPATIENT
Start: 2023-09-14 | End: 2023-09-14

## 2023-09-14 RX ORDER — ONDANSETRON 2 MG/ML
4 INJECTION INTRAMUSCULAR; INTRAVENOUS EVERY 30 MIN PRN
Status: DISCONTINUED | OUTPATIENT
Start: 2023-09-14 | End: 2023-09-15 | Stop reason: HOSPADM

## 2023-09-14 RX ADMIN — FENTANYL CITRATE 50 MCG: 50 INJECTION, SOLUTION INTRAMUSCULAR; INTRAVENOUS at 08:35

## 2023-09-14 RX ADMIN — PROPOFOL 40 MG: 10 INJECTION, EMULSION INTRAVENOUS at 09:36

## 2023-09-14 RX ADMIN — PROPOFOL 150 MCG/KG/MIN: 10 INJECTION, EMULSION INTRAVENOUS at 09:36

## 2023-09-14 RX ADMIN — ACETAMINOPHEN 975 MG: 325 TABLET ORAL at 07:58

## 2023-09-14 RX ADMIN — LIDOCAINE HYDROCHLORIDE 60 MG: 20 INJECTION, SOLUTION INFILTRATION; PERINEURAL at 09:35

## 2023-09-14 RX ADMIN — BUPIVACAINE HYDROCHLORIDE 30 ML: 5 INJECTION, SOLUTION EPIDURAL; INTRACAUDAL at 08:45

## 2023-09-14 RX ADMIN — SODIUM CHLORIDE, SODIUM LACTATE, POTASSIUM CHLORIDE, CALCIUM CHLORIDE: 600; 310; 30; 20 INJECTION, SOLUTION INTRAVENOUS at 08:00

## 2023-09-14 RX ADMIN — CEFAZOLIN SODIUM 2 G: 2 SOLUTION INTRAVENOUS at 09:31

## 2023-09-14 NOTE — DISCHARGE INSTRUCTIONS
Procedure Performed: Left elbow lateral epicondyle debridement  Attending Surgeon: Driss Marshall MD  Date: 9/14/2023    DIAGNOSIS  1. Lateral epicondylitis of left elbow        MEDICATIONS   Resume all home medications as directed unless otherwise instructed during this hospitalization. If there is any question, double check with your primary care provider.  Start new discharge medications as directed.    Take 1 tablet of 650 mg Tylenol (acetaminophen) Arthritis Strength (extended release) and 1 tablet of Aleve (naproxen) 220 mg in the morning with breakfast and in the evening with dinner.     For breakthrough pain use narcotic pain medication as prescribed.    Do not drive or operate machinery while taking narcotic pain medications.   If you are taking other Tylenol containing medicines at home, be sure NOT to exceed 4 gram's (4000 milligrams) of Tylenol per day.   If you are taking pain medications, be sure to take Colace (docusate sodium) as well to prevent constipation. If constipated, try adding another cathartic or enema.  If nausea and vomiting, call the hospital or seek medical attention.    ACTIVITY   Weight bearing: Non-weight bearing to arm.    DIET  Resume same diet prior to your hospital admission.    WOUND   Leave dressing on until you are seen in clinic for your follow up visit.   Watch for signs and symptoms of infection of your wounds including; pain, redness, swelling, drainage or fever.  If you notice any of these symptoms please call or seek medical attention.    Keep wound clean, dry, and intact.  Do not submerge wounds in water until they are healed. No baths, soaking, swimming, or prolonged water exposure for 4 weeks after surgery.    RETURN   Follow-up with Orthopedic Clinic as directed.     Future Appointments   Date Time Provider Department Center   9/26/2023  8:20 AM Driss Marshall MD [facility] [place]   9/26/2023  3:00 PM Simin Walter, OT [facility] [place]   9/28/2023 12:00 PM [facility]  "[place]       Call the [facility] at [phone] during business hours for any symptoms such as:    * Fevers with Temperature greater than 101.5 degrees.   * Pus drainage from wound site.   * Severe pain, not controlled by medication.   * Persistent nausea, vomiting and inablility to tolerate fluids.    If you are receiving care in [place], you may call the Orthopedic clinic at [phone].    FOR URGENT PROBLEMS ONLY, after hours or on weekends call the hospital  at [phone] and ask to speak with the orthopedic resident on call.      [facility]  Home Care Following Anesthesia  For 24 hours after surgery:  Get plenty of rest.  A responsible adult must stay with you for at least 24 hours after you leave the surgery center.  Do not drive or use heavy equipment.  If you have weakness or tingling, don't drive or use heavy equipment until this feeling goes away.   Do not drink alcohol.   Avoid strenuous or risky activities.  Ask for help when climbing stairs.  You may feel lightheaded.  IF so, sit for a few minutes before standing.  Have someone help you get up.   If you have nausea (feel sick to your stomach): Drink only clear liquids such as apple juice, ginger ale, broth or 7-Up.  Rest may also help.  Be sure to drink enough fluids.  Move to a regular diet as you feel able.   You may have a slight fever.  Call the doctor if your fever is over 100 F (37.7 C) (taken under the tongue) or lasts longer than 24 hours.  You may have a dry mouth, a sore throat, muscle aches or trouble sleeping. These should go away after 24 hours.  Do not make important or legal decisions.   It is recommended to avoid smoking.        Today you received a Marcaine or bupivacaine block to numb the nerves near your surgery site.  This is a block using local anesthetic or \"numbing\" medication injected around the nerves to anesthetize or \"numb\" the area supplied by " those nerves.  This block is injected into the muscle layer near your surgical site.  The medication may numb the location where you had surgery for 6-18 hours, but may last up to 24 hours.  If your surgical site is an arm or leg you should be careful with your affected limb, since it is possible to injure your limb without being aware of it due to the numbing.  Until full feeling returns, you should guard against bumping or hitting your limb, and avoid extreme hot or cold temperatures on the skin.  As the block wears off, the feeling will return as a tingling or prickly sensation near your surgical site.  You will experience more discomfort from your incision as the feeling returns.  You may want to take a pain pill (a narcotic or Tylenol if this was prescribed by your surgeon) when you start to experience mild pain before the pain beccomes more severe.  If your pain medications do not control your pain you should notifiy your surgeon.    Tips for taking pain medications  To get the best pain relief possible, remember these points:  Take pain medications as directed, before pain becomes severe.  Pain medication can upset your stomach: taking it with food may help.  Constipation is a common side effect of pain medication. Drink plenty of  fluids.  Eat foods high in fiber. Take a stool softener if recommended by your doctor or pharmacist.  Do not drink alcohol, drive or operate machinery while taking pain medications.  Ask about other ways to control pain, such as with heat, ice or relaxation.    Tylenol/Acetaminophen Consumption    If you feel your pain relief is insufficient, you may take Tylenol/Acetaminophen in addition to your narcotic pain medication.   Be careful not to exceed 4,000 mg of Tylenol/Acetaminophen in a 24 hour period from all sources.  If you are taking extra strength Tylenol/acetaminophen (500 mg), the maximum dose is 8 tablets in 24 hours.  If you are taking regular strength acetaminophen (325  mg), the maximum dose is 12 tablets in 24 hours.    975 mg Tylenol taken at 8:00 AM, OK to take additional Tylenol after 2:00 PM. Follow package instructions.    Call a doctor for any of the following:  Signs of infection (fever, growing tenderness at the surgery site, a large amount of drainage or bleeding, severe pain, foul-smelling drainage, redness, swelling).  It has been over 8 to 10 hours since surgery and you are still not able to urinate (pass water).  Headache for over 24 hours.  Signs of Covid-19 infection (temperature over 100 degrees, shortness of breath, cough, loss of taste/smell, generalized body aches, persistent headache, chills, sore throat, nausea/vomiting/diarrhea)  Your doctor is:   Dr. Driss Marshall, Orthopaedics: [phone]               Or dial [phone] and ask for the resident on call for:  Orthopaedics  For emergency care, call the:  [facility] Emergency Department: [phone] (TTY for hearing impaired: [phone])
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
## 2023-09-14 NOTE — BRIEF OP NOTE
[facility]    Brief Operative Note    Pre-operative diagnosis: Lateral epicondylitis of left elbow [M77.12]  Post-operative diagnosis Same as pre-operative diagnosis    Procedure: Procedure(s):  LEFT LATERAL EPICONDYLE DEBRIDEMENT  Surgeon: Surgeon(s) and Role:     * Driss Marshall MD - Primary     * Philipp Barbosa MD - Resident - Assisting  Anesthesia: MAC with Block   Estimated Blood Loss: Minimal    Drains: None  Specimens: * No specimens in log *  Findings:   See full op note .  Complications: None.  Implants: * No implants in log *

## 2023-09-14 NOTE — OP NOTE
Patient: Refugio Davies  : 1965  MRN: [id number]    DATE OF OPERATION: 2023      OPERATIVE REPORT       PREOPERATIVE DIAGNOSIS:  Left lateral epicondylitis     POSTOPERATIVE DIAGNOSIS:  Left lateral epicondylitis     PROCEDURE:  Excisional debridement of left extensor carpi radialis brevis muscle at lateral epicondyle    SURGEON:  Driss Marshall MD     ASSISTANT(S):  Philipp Barbosa MD ([id number])    ANESTHESIA:  MAC + regional     IMPLANTS:   None    ESTIMATED BLOOD LOSS:  2cc    DVT PROPHYLAXIS:  SCDs    TOURNIQUET TIME:   26 minutes    SPECIMENS REMOVED:  None    INTRAOPERATIVE FINDINGS:  Degenerated extensor carpi radialis brevis tendon at its proximal origin at the lateral epicondyle    COMPLICATIONS:  None    DISPOSITION:  Stable to PACU.     INDICATIONS:  The patient is a 58-year-old male who presented to clinic with chronic left lateral elbow pain after sustaining a work injury 2022.  Pain is localized over the lateral epicondyle and aggravated by activity.  Symptoms have persisted despite all conservative management, including rest, activity modification, hand therapy, PRP injections, Tenex, and steroids.    The indications for surgery were discussed with the patient. The benefits, risks, and alternatives of operative management were discussed in detail with the patient. The patient understands that the risks of surgery include, but are not limited to: infection, bleeding, injury to nearby structures (such as nerves, blood vessels, and tendons), persistent symptoms, need for additional surgery, pain, stiffness, scarring, need for rehabilitation, and anesthetic complications. Postoperative rehab and recovery discussed. Patient expressed understanding and elected to proceed with surgery. All questions were answered to the patient's satisfaction.      Consent was obtained for the procedure.     DESCRIPTION OF PROCEDURE IN DETAIL:  The patient was seen in the preoperative care unit.  Patient identity, consent, procedure to be performed, and operative site were verified with the patient. The left upper extremity was marked.  The regional anesthesia team performed a preoperative block.     The patient was brought to the operating room and placed supine on the operating room table. The left upper extremity was placed on an armboard. SCDs were placed on the bilateral lower extremities. A well-padded tourniquet was placed on the upper arm.  Sedation was administered by anesthesia.     The left upper extremity was prepped and draped in the usual sterile fashion. A timeout was performed confirming the correct patient, procedure, operative site, and antibiotics. All were in agreement.     The limb was exsanguinated and the tourniquet inflated to 250 mmHg.  An oblique incision was made in the skin over the lateral elbow extending from the supracondylar ridge distally over the common extensor tendon. Care was taken to place the incision anterior to the lateral epicondyle. Blunt dissection was carried down to the muscle fascia.  The fascial interval between the extensor carpi radialis longus tendon and the extensor digitorum communis was incised sharply.  The interval between these muscles was developed.  The ECRL and EDC were then bluntly elevated off of the ECRB tendon below.  Examination of the ECRB tendon demonstrated degenerative change with thickened, white, disorganized tissue compared to the surrounding healthy common extensor tendon. The ECRB origin was then sharply excised layer by layer until the underlying capsule was reached. A portion of the capsule was also excised to ensure full debridement. All questionable appearing tissue was excised. The LUCL was examined and noted to be intact.  The elbow was stable with a negative pivot shift.     The wound was copiously irrigated. The ECRL and EDC tendons all appeared healthy and remained attached to the lateral epicondyle. No suture anchor was  necessary. The ECRL and EDC tendons were then repaired side to side with a running locking 0- Vicryl. The tourniquet was deflated.  Hemostasis was assured.  The deep dermal tissues were closed with 3-0 Vicryl interrupted buried sutures.  The skin was then closed with a running 4-0 Monocryl subcuticular suture.  Steri-Strip and a sterile dressing were applied.  A well-padded posterior long-arm splint was then applied.     All needle and sponge counts were correct at the end of the procedure. There were no complications.     The patient was awoken from anesthesia and taken to the postoperative recovery unit in stable condition.     I was present and scrubbed for the entire procedure.     POSTOPERATIVE PLAN:  The patient will be discharged home.  He will be strict nonweightbearing of the left upper extremity.  He will return in 7-14 days for a wound check.  He will be provided with a removable wrist brace which he will wear for 6 weeks postoperatively to control wrist range of motion. He can use his left hand for daily tasks but no lifting. He will see hand therapy to begin elbow range of motion.  He may begin weight bearing and strengthening at 6 weeks.        Driss Marshall MD     Hand, Upper Extremity & Microvascular Surgery  Department of Orthopedic Surgery  [facility]

## 2023-09-14 NOTE — OR NURSING
Pt c/o left side neck/shoulder/chest pain. Notified Dr. Reddy. MDA to bedside.    No additional interventions at this time. 5-lead EKG tracing NSR. See Anesthesia Postprocedure note for details. OK to discharge home per Dr. Reddy. Pt agrees with plan and will go to ED if he has continuous chest pain or shortness of breath.

## 2023-09-14 NOTE — ANESTHESIA POSTPROCEDURE EVALUATION
Patient: Refugio Davies    Procedure: Procedure(s):  LEFT LATERAL EPICONDYLE DEBRIDEMENT       Anesthesia Type:  MAC    Note:  Disposition: Outpatient   Postop Pain Control: Uneventful            Sign Out: Well controlled pain   PONV: No   Neuro/Psych: Uneventful            Sign Out: Acceptable/Baseline neuro status   Airway/Respiratory: Uneventful            Sign Out: Acceptable/Baseline resp. status   CV/Hemodynamics: Uneventful            Sign Out: Acceptable CV status; No obvious hypovolemia; No obvious fluid overload   Other NRE: NONE   DID A NON-ROUTINE EVENT OCCUR?     Event details/Postop Comments:  C/O minimal pain left chest wall, left neck, left posterior superior neck discomfort with inspiration. No palpable tenderness. No crepitus. Lungs CTA all lung fields. 02 sat 97% on room air. Denies SOB. Monitor ECG :  NSR without ST segment change.    Possible musculoskeletal discomfort from positioning and arm manipulation during surgery.     Explained to patient that pneumothorax highly improbable with infraclavicular block. If he should develop SOB or pain becomes persistent or increasingly disturbing or worrisome to him to go to urgency/emergency room for evaluation.    He is comfortable and agrees with plan. Questions answered.    SYLVIA Reddy 11:30 am            Last vitals:  Vitals Value Taken Time   /61 09/14/23 1053   Temp 36.3  C (97.4  F) 09/14/23 1038   Pulse     Resp 16 09/14/23 1053   SpO2 96 % 09/14/23 1053       Electronically Signed By: Apolinar Reddy MD  September 14, 2023  11:23 AM

## 2023-09-14 NOTE — OR NURSING
Patient received left side Brachial Plexus nerve block  without Exparel.    Fentanyl 50mcg and Versed 1mg given. Tolerated procedure well.

## 2023-09-14 NOTE — ANESTHESIA PROCEDURE NOTES
Brachial plexus Procedure Note    Pre-Procedure   Staff -        Anesthesiologist:  Apolinar Reddy MD       Resident/Fellow: Benito Townsend MD       Performed By: resident, with residents and with CRNAs       Procedure performed by resident/fellow/CRNA in presence of a teaching physician.         Location: pre-op       Pre-Anesthestic Checklist: patient identified, IV checked, site marked, risks and benefits discussed, informed consent, monitors and equipment checked, pre-op evaluation, at physician/surgeon's request and post-op pain management  Timeout:       Correct Patient: Yes        Correct Procedure: Yes        Correct Site: Yes        Correct Position: Yes        Correct Laterality: Yes        Site Marked: Yes  Procedure Documentation  Procedure: Brachial plexus       Diagnosis: POSTOP PAIN       Laterality: left       Patient Position: sitting       Patient Prep/Sterile Barriers: sterile gloves, mask       Skin prep: Chloraprep (supraclavicular approach).       Needle Gauge: 21.        Needle Length (millimeters): 110        Ultrasound guided       1. Ultrasound was used to identify targeted nerve, plexus, vascular marker, or fascial plane and place a needle adjacent to it in real-time.       2. Ultrasound was used to visualize the spread of anesthetic in close proximity to the above referenced structure.       3. A permanent image is entered into the patient's record.    Assessment/Narrative         The placement was negative for: blood aspirated, painful injection and site bleeding       Paresthesias: No.       Bolus given via needle. no blood aspirated via catheter.        Secured via.        Insertion/Infusion Method: Single Shot       Complications: none    Medication(s) Administered   Bupivacaine 0.5% PF (Infiltration) - Infiltration   30 mL - 9/14/2023 8:45:00 AM   Comments:  Routine left infraclavicular brachial plexus nerve block without complications. Patient tolerated well.      FOR [facility]  "([facility]) ONLY:   Pain Team Contact information: please page the Pain Team Via [employer]. Search \"Pain\". During daytime hours, please page the attending first. At night please page the resident first.      "

## 2023-09-14 NOTE — ANESTHESIA POSTPROCEDURE EVALUATION
Patient: Refugio Davies    Procedure: Procedure(s):  LEFT LATERAL EPICONDYLE DEBRIDEMENT       Anesthesia Type:  MAC    Note:  Disposition: Outpatient   Postop Pain Control: Uneventful            Sign Out: Well controlled pain   PONV: No   Neuro/Psych: Uneventful            Sign Out: Acceptable/Baseline neuro status   Airway/Respiratory: Uneventful            Sign Out: Acceptable/Baseline resp. status   CV/Hemodynamics: Uneventful            Sign Out: Acceptable CV status; No obvious hypovolemia; No obvious fluid overload   Other NRE: NONE   DID A NON-ROUTINE EVENT OCCUR?            Last vitals:  Vitals Value Taken Time   BP 97/63 09/14/23 1038   Temp 36.3  C (97.4  F) 09/14/23 1038   Pulse     Resp 16 09/14/23 1038   SpO2 95 % 09/14/23 1038       Electronically Signed By: Apolinar Reddy MD  September 14, 2023  10:48 AM

## 2023-09-14 NOTE — ANESTHESIA CARE TRANSFER NOTE
Patient: Refugio Davies    Procedure: Procedure(s):  LEFT LATERAL EPICONDYLE DEBRIDEMENT       Diagnosis: Lateral epicondylitis of left elbow [M77.12]  Diagnosis Additional Information: No value filed.    Anesthesia Type:   MAC     Note:      Level of Consciousness: awake  Oxygen Supplementation: room air    Independent Airway: airway patency satisfactory and stable  Dentition: dentition unchanged  Vital Signs Stable: post-procedure vital signs reviewed and stable  Report to RN Given: handoff report given  Patient transferred to: Phase II    Handoff Report: Identifed the Patient, Identified the Reponsible Provider, Reviewed the pertinent medical history, Discussed the surgical course, Reviewed Intra-OP anesthesia mangement and issues during anesthesia, Set expectations for post-procedure period and Allowed opportunity for questions and acknowledgement of understanding      Vitals:  Vitals Value Taken Time   /79    Temp 98    Pulse 68    Resp 14    SpO2 99        Electronically Signed By: SATNAM Galicia CRNA  September 14, 2023  10:37 AM

## 2023-09-19 NOTE — PROGRESS NOTES
I spoke to the patient yesterday late afternoon  by calling his cellphone. He reported that he was still in the hospital and that he continued to have an airleak when they clamped his chest tube to suction. We discussed the very low incidence of pneumothorax with the infraclavicular approach to blocking the brachial plexus. I reassured him that I was present the entire time that the resident was performing the block. I also noted that at no time during the placement of the block did we aspirate air nor did we observe the needle tip passing a rib nor entering the intrathoracic space. Either or both of these would be expected to occur and be observed on ultrasound when a pneumothorax occurs.    I answered his questions. I also gave the patient my contact information should he have further questions or issues arise.    SYLVIA Reddy MD

## 2023-09-25 NOTE — PROGRESS NOTES
Orthopaedic Surgery Hand Clinic Progress Note    Patient Name: Refugio Dvaies  MRN: [id number]  : 1965  Date: Sep 26, 2023    Date of Surgery: 23    Surgery Performed: Excisional debridement of left extensor carpi radialis brevis muscle at lateral epicondyle     Subjective:  Mr. Refugio Davies is a 58 year old male who returns 12 days s/p above procedure. Doing well with regards to the elbow. Patient did have to go to the ED following his surgery as he was having difficulty breathing later that evening, and was found to have postprocedural pneumothorax. Had a chest tube placed. Hospitalized for 5 days. No issues breathing now.    Objective:  There were no vitals taken for this visit.    General: alert, appropriate, NAD  HEENT: NC/AT  CV: RRR by pulse  Pulm: Unlabored on RA  MSK:  Incision c/d/I, no erythema, drainage, evidence of infection  Full elbow range of motion already  Wrist range of motion deferred  5/5 EPL, FPL, hand intrinsics  Sensation intact to light touch median radial, ulnar nerve distributions  Warm well-perfused, capillary fill less than 2 seconds    Imaging:  Chest x-ray today demonstrates no pneumothorax.    Assessment/Plan:  58-year-old male status post left lateral epicondyle debridement 2023.  Immediate postoperative course complicated by pneumothorax secondary to nerve block.    Patient now has no issues breathing.  Chest x-ray today demonstrates resolution of pneumothorax.  I discussed his case with Dr. Reddy who did also follow-up with the patient.    Patient was provided with a left wrist brace today.  To be worn at all times except for exercises and hygiene.  Begin hand therapy for elbow and wrist range of motion and stretching.  He can begin gentle strengthening and weightbearing 6 weeks postop.    Follow-up at 6 weeks postop for reevaluation.  Patient is to be off work until that time.    All questions answered.  The patient voiced understanding and  agreement.    Driss Marshall MD    Hand & Upper Extremity Surgery  Department of Orthopedic Surgery  [facility]

## 2023-09-26 ENCOUNTER — OFFICE VISIT (OUTPATIENT)
Dept: ORTHOPEDICS | Facility: CLINIC | Age: 58
End: 2023-09-26
Payer: OTHER MISCELLANEOUS

## 2023-09-26 ENCOUNTER — ANCILLARY PROCEDURE (OUTPATIENT)
Dept: GENERAL RADIOLOGY | Facility: CLINIC | Age: 58
End: 2023-09-26
Attending: STUDENT IN AN ORGANIZED HEALTH CARE EDUCATION/TRAINING PROGRAM
Payer: OTHER MISCELLANEOUS

## 2023-09-26 DIAGNOSIS — M77.12 LATERAL EPICONDYLITIS OF LEFT ELBOW: Primary | ICD-10-CM

## 2023-09-26 DIAGNOSIS — J95.811 POSTPROCEDURAL PNEUMOTHORAX: ICD-10-CM

## 2023-09-26 PROCEDURE — 99024 POSTOP FOLLOW-UP VISIT: CPT | Performed by: STUDENT IN AN ORGANIZED HEALTH CARE EDUCATION/TRAINING PROGRAM

## 2023-09-26 PROCEDURE — 71045 X-RAY EXAM CHEST 1 VIEW: CPT | Mod: TC | Performed by: RADIOLOGY

## 2023-09-26 NOTE — PATIENT INSTRUCTIONS
Thank you for choosing [facility]    Dr. Marshall Locations:    [facility] [address], [address]  [place], [place] [address] [place] [address]   Appointments: [phone] Appointments: [phone]   Fax: [phone] Fax: [phone]     Follow up: on 10/31/23    Hand Therapy orders have been placed with [facility] (formally [facility])  You can call [phone] to schedule at your convenience.       Please call [phone] to schedule your follow up appointment.

## 2023-09-26 NOTE — LETTER
2023         RE: Refugio Davies  [address]  [place] [address]        Dear Colleague,    Thank you for referring your patient, Refugio Davies, to the [facility] [place]. Please see a copy of my visit note below.    Orthopaedic Surgery Hand Clinic Progress Note    Patient Name: Refugio Davies  MRN: [id number]  : 1965  Date: Sep 26, 2023    Date of Surgery: 23    Surgery Performed: Excisional debridement of left extensor carpi radialis brevis muscle at lateral epicondyle     Subjective:  Mr. Refugio Davies is a 58 year old male who returns 12 days s/p above procedure. Doing well with regards to the elbow. Patient did have to go to the ED following his surgery as he was having difficulty breathing later that evening, and was found to have postprocedural pneumothorax. Had a chest tube placed. Hospitalized for 5 days. No issues breathing now.    Objective:  There were no vitals taken for this visit.    General: alert, appropriate, NAD  HEENT: NC/AT  CV: RRR by pulse  Pulm: Unlabored on RA  MSK:  Incision c/d/I, no erythema, drainage, evidence of infection  Full elbow range of motion already  Wrist range of motion deferred  5/5 EPL, FPL, hand intrinsics  Sensation intact to light touch median radial, ulnar nerve distributions  Warm well-perfused, capillary fill less than 2 seconds    Imaging:  Chest x-ray today demonstrates no pneumothorax.    Assessment/Plan:  58-year-old male status post left lateral epicondyle debridement 2023.  Immediate postoperative course complicated by pneumothorax secondary to nerve block.    Patient now has no issues breathing.  Chest x-ray today demonstrates resolution of pneumothorax.  I discussed his case with Dr. Reddy who did also follow-up with the patient.    Patient was provided with a left wrist brace today.  To be worn at all times except for exercises and hygiene.  Begin hand therapy for elbow and wrist range of  motion and stretching.  He can begin gentle strengthening and weightbearing 6 weeks postop.    Follow-up at 6 weeks postop for reevaluation.  Patient is to be off work until that time.    All questions answered.  The patient voiced understanding and agreement.    Driss Marshall MD    Hand & Upper Extremity Surgery  Department of Orthopedic Surgery  [facility]        Again, thank you for allowing me to participate in the care of your patient.        Sincerely,        Driss Marshall MD

## 2023-09-26 NOTE — LETTER
September 26, 2023      Refugio Davies  [address]  [place] [address]        To Whom It May Concern:    Refugio Davies  was seen on 9/26/23.  Please excuse him  until cleared due to surgery. His work ability will be reevaluated at his next appointment on 10/31/23.        Sincerely,        Driss Marshall MD

## 2023-10-12 ENCOUNTER — THERAPY VISIT (OUTPATIENT)
Dept: OCCUPATIONAL THERAPY | Facility: CLINIC | Age: 58
End: 2023-10-12
Attending: STUDENT IN AN ORGANIZED HEALTH CARE EDUCATION/TRAINING PROGRAM
Payer: OTHER MISCELLANEOUS

## 2023-10-12 DIAGNOSIS — M25.522 LEFT ELBOW PAIN: Primary | ICD-10-CM

## 2023-10-12 PROCEDURE — 97140 MANUAL THERAPY 1/> REGIONS: CPT | Mod: GO | Performed by: OCCUPATIONAL THERAPIST

## 2023-10-12 PROCEDURE — 97110 THERAPEUTIC EXERCISES: CPT | Mod: GO | Performed by: OCCUPATIONAL THERAPIST

## 2023-10-12 PROCEDURE — 97165 OT EVAL LOW COMPLEX 30 MIN: CPT | Mod: GO | Performed by: OCCUPATIONAL THERAPIST

## 2023-10-12 NOTE — PROGRESS NOTES
OCCUPATIONAL THERAPY EVALUATION  Type of Visit: Evaluation    See electronic medical record for Abuse and Falls Screening details.    Subjective      Presenting condition or subjective complaint: Had surgery on the 14th, appointment order from dr Marshall  Date of onset: 09/14/23    Relevant medical history:   none per pt report  Dates & types of surgery:  L elbow LEP debridement 9/14/2023    Prior diagnostic imaging/testing results: MRI; X-ray; EMG     Prior therapy history for the same diagnosis, illness or injury: Yes  Feb 2022    Prior Level of Function  ADL: Independent    Living Environment  Type of home: [facility]   Stairs to enter the home: Yes   Is there a railing: Yes   Ramp: No   Stairs inside the home: Yes   Is there a railing: Yes     Employment: Yes [employer]     Objective   ADDITIONAL HISTORY:  Right hand dominant  Patient reports symptoms of pain, stiffness/loss of motion, weakness/loss of strength, and tingling of the left elbow and forearm  Transportation: drives  Currently not working due to present treatment problem    Functional Outcome Measure:  Upper Extremity Functional Index  SCORE:   Column Totals: 61/80  (A lower score indicates greater disability.)    Pain Level (Scale 0-10)   10/12/2023   At Rest 2-3   With Use 2-5     Pain Description  Date 10/12/2023   Location LEP and radiates to hand/ulnar digits   Pain Quality Aching and Throbbing   Frequency constant     Pain is worst  daytime   Exacerbated by  Increased use   Relieved by cold and rest   Progression Gradually getting better     Edema  Mild to moderate at LEP and scar    Scar  Mildly sensitive, moderately adherent    Sensation  Decreased intermittently in Ulnar Nerve distribution per pt report    Special Tests   - none    + mild    ++ moderate    +++ severe       10/12/2023   Elbow Flexion Test ++   Ulnar Nerve Subluxation at Ebow -   Tinels Cubital Tunnel +   Tinels [place] -   Median Nerve Compression at Pronator + slight ulnar n  Sx   Tinels at Carpal Tunnel + ulnar n Sx     ROM  Pain Report: - none  + mild    ++ moderate    +++ severe   Elbow 10/12/2023 10/12/2023   AROM (PROM) Right Left   Extension -10 -15   Flexion 143 130   Supination 80 80   Pronation 80 75     Wrist 10/12/2023 10/12/2023   AROM (PROM) Right Left   Extension 70 65   Flexion 65 60   RD 25 15   UD 35 20     Strength  Contraindicated until 6 weeks post    Assessment & Plan   CLINICAL IMPRESSIONS  Medical Diagnosis: L lateral epicondylitis    Treatment Diagnosis: L elbow pain s/p LEP debridement    Impression/Assessment: Pt is a 58 year old male presenting to Occupational Therapy due to left elbow pain post LEP debridement.  Patient's limitations or Problem List includes: Pain, Decreased ROM/motion, Increased edema, Weakness, Sensory disturbance, Decreased , and Tightness in musculature of the left elbow which interferes with the patient's ability to perform Self Care Tasks (dressing, eating), Work Tasks, Recreational Activities, and Household Chores as compared to previous level of function.    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, meal preparation and cleanup, work, and leisure activities  Clinical Decision Making (Complexity): Low complexity    PLAN OF CARE  Treatment Interventions:  Modalities:  US  Therapeutic Exercise:  AROM, PROM, Blocking, Isotonics, and Isometrics  Neuromuscular re-education:  Nerve Gliding, Posture, and Kinesiotaping  Manual Techniques:  Scar mobilization and Friction massage  Orthotic Fabrication:  Static, Forearm and Long arm  Self Care:  Self Care Tasks and Ergonomic Considerations    Long Term Goals   OT Goal 1  Goal Identifier: lifting  Goal Description: Pt will report decreased pain and increased strength to be able to RTW and lift with mild to no difficulty  Rationale:  (for safety and  independence with ADL's and work tasks)  Goal Progress: current off work  Target Date: 12/10/23      Frequency of Treatment: 1 x per week  Duration of Treatment: 8 weeks     Recommended Referrals to Other Professionals:  NA  Education Assessment: Learner/Method: Patient;Demonstration;Pictures/Video  Education Comments: printed PTRx     Risks and benefits of evaluation/treatment have been explained.   Patient/Family/caregiver agrees with Plan of Care.     Evaluation Time:    OT Maribeth, Low Complexity Minutes (28510): 15   Present: Not applicable     Signing Clinician: Estella Jaimes OT

## 2023-10-23 ENCOUNTER — THERAPY VISIT (OUTPATIENT)
Dept: OCCUPATIONAL THERAPY | Facility: CLINIC | Age: 58
End: 2023-10-23
Payer: OTHER MISCELLANEOUS

## 2023-10-23 DIAGNOSIS — M25.522 LEFT ELBOW PAIN: Primary | ICD-10-CM

## 2023-10-23 PROCEDURE — 97110 THERAPEUTIC EXERCISES: CPT | Mod: GO | Performed by: OCCUPATIONAL THERAPIST

## 2023-10-23 PROCEDURE — 97140 MANUAL THERAPY 1/> REGIONS: CPT | Mod: GO | Performed by: OCCUPATIONAL THERAPIST

## 2023-10-30 NOTE — PROGRESS NOTES
Orthopaedic Surgery Hand Clinic Progress Note    Patient Name: Refugio Davies  MRN: [id number]  : 1965  Date: Oct 31, 2023    Date of Surgery: 23    Surgery Performed: Excisional debridement of left extensor carpi radialis brevis muscle at lateral epicondyle     Subjective:    Update:10/30 - Patient was last seen 23. He is now 6 weeks out from surgery. He continues to have pain at the lateral elbow. This seems to radiate from the lateral elbow, over ECU into dorsal ulnar hand and 4th/5th fingers. He notes intermittent pain and tingling that occurs primarily with his therapy exercises and work around the house. He notes no significant change since the surgery. He has been attending hand therapy and working on his home exercise 2-3X/ day. He has to back off at times due to pain. Still using the brace. He is utilizing ice and heat as needed.    Objective:  There were no vitals taken for this visit.    General: alert, appropriate, NAD  HEENT: NC/AT  CV: RRR by pulse  Pulm: Unlabored on RA  MSK:  Incision c/d/I, no erythema, drainage, evidence of infection  Full elbow range of motion  No lateral elbow pain with resisted MF extension  Mild pain over dorsal ulnar forearm with resisted wrist extension  Pain with resisted small finger extension  5/5 EPL, FPL, hand intrinsics  Sensation intact to light touch median radial, ulnar nerve distributions. Intact DUSN  +TTP cubital tunnel, positive Tinel's at the cubital tunnel with radiation to DUSN and small finger  No ulnar nerve subluxation  Warm well-perfused, capillary refill less than 2 seconds    Assessment/Plan:  58-year-old male status post left lateral epicondyle debridement 2023.      Unfortunately the patient has not noticed much improvement after his surgery. He continues to be consistent regarding his symptoms, localized over lateral elbow, radiating down the dorsal ulnar forearm into DUSN distribution in small finger.    While this is a  somewhat atypical distribution for cubital tunnel syndrome, he has been consistent with the description of his symptoms. His prior EMG did not show any evidence of ulnar neuropathy. MRI of elbow showed normal ulnar nerve.    I would like to send him for an ultrasound of his cubital tunnel as well as a diagnostic/therapeutic injection of his ulnar nerve.    He should continue bracing and OT in the meantime. I instructed him to pay attention to his symptoms after injection and duration of relief, if any.    He should remain off work for the time being    All questions answered.  The patient voiced understanding and agreement.  Follow-up after ultrasound and injections.    Driss Marshall MD    Hand & Upper Extremity Surgery  Department of Orthopedic Surgery  [facility]

## 2023-10-31 ENCOUNTER — OFFICE VISIT (OUTPATIENT)
Dept: ORTHOPEDICS | Facility: CLINIC | Age: 58
End: 2023-10-31
Payer: OTHER MISCELLANEOUS

## 2023-10-31 VITALS — DIASTOLIC BLOOD PRESSURE: 74 MMHG | SYSTOLIC BLOOD PRESSURE: 146 MMHG

## 2023-10-31 DIAGNOSIS — G56.22 CUBITAL TUNNEL SYNDROME ON LEFT: ICD-10-CM

## 2023-10-31 DIAGNOSIS — M25.522 CHRONIC ELBOW PAIN, LEFT: Primary | ICD-10-CM

## 2023-10-31 DIAGNOSIS — G89.29 CHRONIC ELBOW PAIN, LEFT: Primary | ICD-10-CM

## 2023-10-31 PROCEDURE — 99024 POSTOP FOLLOW-UP VISIT: CPT | Performed by: STUDENT IN AN ORGANIZED HEALTH CARE EDUCATION/TRAINING PROGRAM

## 2023-10-31 NOTE — LETTER
10/31/2023         RE: Refugio Davies  [address] [place]  [place] [address]        Dear Colleague,    Thank you for referring your patient, Refugio Davies, to the [facility] [place]. Please see a copy of my visit note below.    Orthopaedic Surgery Hand Clinic Progress Note    Patient Name: Refugio Davies  MRN: [id number]  : 1965  Date: Oct 31, 2023    Date of Surgery: 23    Surgery Performed: Excisional debridement of left extensor carpi radialis brevis muscle at lateral epicondyle     Subjective:    Update:10/30 - Patient was last seen 23. He is now 6 weeks out from surgery. He continues to have pain at the lateral elbow. This seems to radiate from the lateral elbow, over ECU into dorsal ulnar hand and 4th/5th fingers. He notes intermittent pain and tingling that occurs primarily with his therapy exercises and work around the house. He notes no significant change since the surgery. He has been attending hand therapy and working on his home exercise 2-3X/ day. He has to back off at times due to pain. Still using the brace. He is utilizing ice and heat as needed.    Objective:  There were no vitals taken for this visit.    General: alert, appropriate, NAD  HEENT: NC/AT  CV: RRR by pulse  Pulm: Unlabored on RA  MSK:  Incision c/d/I, no erythema, drainage, evidence of infection  Full elbow range of motion  No lateral elbow pain with resisted MF extension  Mild pain over dorsal ulnar forearm with resisted wrist extension  Pain with resisted small finger extension  5/5 EPL, FPL, hand intrinsics  Sensation intact to light touch median radial, ulnar nerve distributions. Intact DUSN  +TTP cubital tunnel, positive Tinel's at the cubital tunnel with radiation to DUSN and small finger  No ulnar nerve subluxation  Warm well-perfused, capillary refill less than 2 seconds    Assessment/Plan:  58-year-old male status post left lateral epicondyle debridement 2023.       Unfortunately the patient has not noticed much improvement after his surgery. He continues to be consistent regarding his symptoms, localized over lateral elbow, radiating down the dorsal ulnar forearm into DUSN distribution in small finger.    While this is a somewhat atypical distribution for cubital tunnel syndrome, he has been consistent with the description of his symptoms. His prior EMG did not show any evidence of ulnar neuropathy. MRI of elbow showed normal ulnar nerve.    I would like to send him for an ultrasound of his cubital tunnel as well as a diagnostic/therapeutic injection of his ulnar nerve.    He should continue bracing and OT in the meantime. I instructed him to pay attention to his symptoms after injection and duration of relief, if any.    He should remain off work for the time being    All questions answered.  The patient voiced understanding and agreement.  Follow-up after ultrasound and injections.    Driss Marshall MD    Hand & Upper Extremity Surgery  Department of Orthopedic Surgery  [facility]        Again, thank you for allowing me to participate in the care of your patient.        Sincerely,        Driss Marshall MD

## 2023-10-31 NOTE — LETTER
October 31, 2023      Refugio Davies  [address]  [place] [address]        To Whom It May Concern:    Refugio Davies  was seen on 10/31/23.  Please excuse him  until 12/12/23 due to surgery.        Sincerely,        Driss Marshall MD

## 2023-11-07 ENCOUNTER — THERAPY VISIT (OUTPATIENT)
Dept: OCCUPATIONAL THERAPY | Facility: CLINIC | Age: 58
End: 2023-11-07
Payer: OTHER MISCELLANEOUS

## 2023-11-07 DIAGNOSIS — M25.522 LEFT ELBOW PAIN: Primary | ICD-10-CM

## 2023-11-07 PROCEDURE — 97140 MANUAL THERAPY 1/> REGIONS: CPT | Mod: GO | Performed by: OCCUPATIONAL THERAPIST

## 2023-11-07 PROCEDURE — 97110 THERAPEUTIC EXERCISES: CPT | Mod: GO | Performed by: OCCUPATIONAL THERAPIST

## 2023-11-10 ENCOUNTER — ANCILLARY PROCEDURE (OUTPATIENT)
Dept: ULTRASOUND IMAGING | Facility: CLINIC | Age: 58
End: 2023-11-10
Attending: STUDENT IN AN ORGANIZED HEALTH CARE EDUCATION/TRAINING PROGRAM
Payer: OTHER MISCELLANEOUS

## 2023-11-10 DIAGNOSIS — G56.22 CUBITAL TUNNEL SYNDROME ON LEFT: ICD-10-CM

## 2023-11-10 DIAGNOSIS — G89.29 CHRONIC ELBOW PAIN, LEFT: ICD-10-CM

## 2023-11-10 DIAGNOSIS — M25.522 CHRONIC ELBOW PAIN, LEFT: ICD-10-CM

## 2023-11-10 PROCEDURE — 76942 ECHO GUIDE FOR BIOPSY: CPT | Mod: GC | Performed by: RADIOLOGY

## 2023-11-10 PROCEDURE — 64450 NJX AA&/STRD OTHER PN/BRANCH: CPT | Mod: GC | Performed by: RADIOLOGY

## 2023-11-10 RX ORDER — TRIAMCINOLONE ACETONIDE 40 MG/ML
40 INJECTION, SUSPENSION INTRA-ARTICULAR; INTRAMUSCULAR ONCE
Status: COMPLETED | OUTPATIENT
Start: 2023-11-10 | End: 2023-11-10

## 2023-11-10 RX ORDER — LIDOCAINE HYDROCHLORIDE 10 MG/ML
5 INJECTION, SOLUTION INFILTRATION; PERINEURAL ONCE
Status: COMPLETED | OUTPATIENT
Start: 2023-11-10 | End: 2023-11-10

## 2023-11-10 RX ADMIN — TRIAMCINOLONE ACETONIDE 40 MG: 40 INJECTION, SUSPENSION INTRA-ARTICULAR; INTRAMUSCULAR at 14:19

## 2023-11-10 RX ADMIN — LIDOCAINE HYDROCHLORIDE 3 ML: 10 INJECTION, SOLUTION INFILTRATION; PERINEURAL at 14:17

## 2023-11-12 ENCOUNTER — HEALTH MAINTENANCE LETTER (OUTPATIENT)
Age: 58
End: 2023-11-12

## 2023-11-29 ENCOUNTER — THERAPY VISIT (OUTPATIENT)
Dept: OCCUPATIONAL THERAPY | Facility: CLINIC | Age: 58
End: 2023-11-29
Payer: OTHER MISCELLANEOUS

## 2023-11-29 DIAGNOSIS — M25.522 LEFT ELBOW PAIN: Primary | ICD-10-CM

## 2023-11-29 PROCEDURE — 97140 MANUAL THERAPY 1/> REGIONS: CPT | Mod: GO | Performed by: OCCUPATIONAL THERAPIST

## 2023-11-29 PROCEDURE — 97110 THERAPEUTIC EXERCISES: CPT | Mod: GO | Performed by: OCCUPATIONAL THERAPIST

## 2023-11-29 NOTE — PROGRESS NOTES
11/29/23 0500   Appointment Info   Treating Provider Estella Jaimes, OTR/L, CHT   Total/Authorized Visits 8   Visits Used 4   Medical Diagnosis L lateral epicondylitis   OT Tx Diagnosis L elbow pain s/p LEP debridement   Progress Note/Certification   Onset of Illness/Injury or Date of Surgery 09/14/23   Therapy Frequency 1 x per week   Predicted Duration 8 weeks   Progress Note Due Date 12/10/23   Progress Note Completed Date 10/12/23   OT Goal 1   Goal Identifier lifting   Goal Description Pt will report decreased pain and increased strength to be able to RTW and lift with mild to no difficulty   Rationale   (for safety and independence with ADL's and work tasks)   Goal Progress pain is less frequent, anticipate RTW after next MD f/u 12/12/2023   Target Date 12/10/23   Date Met 11/29/23   Subjective Report   Subjective Report Had US guided injection to the ulnar nerve at the elbow 11/10/23, seemed to have helped some. Overall the pain is less often and the elbow is feeling pretty good. I feel ready to gradually RTW.   Objective Measures   Objective Measures Objective Measure 1;Hand Obj Measures   Hand Objective Measures ROM;Special Tests;Strength   ROM Elbow ROM;Forearm ROM;Wrist ROM   Special Tests Ulnar Nerve Special Tests   Strength    Objective Measure 1   Objective Measure pain with use 0-10   Details 3-4/10, reports pain is less frequent   Elbow ROM    Extension 0   Flexion 140   Forearm ROM   Supination 85   Pronation  75   Wrist ROM    Extension 75   Flexion 65   Radial Deviation (RD) 20   Ulnar Deviation (UD) 35   Ulnar Nerve Special Tests   Tinel's Cubital Tunnel NT   Tinel's Guyon's Canal NT   Elbow Flexion Test NT   Ulnar Nerve Subluxation at Elbow NT    (measured in pounds)    Average Strength R:90, 64 (1 trial)   Treatment Interventions (OT)   Interventions Therapeutic Procedure/Exercise;Manual Therapy;Neuromuscular Re-education   Self Care/Home Management   Self Care Self Care 2   Self  "Care 1 HEP   Self Care 2 review anatomy of cubital tunnel/ulnar nerve   Self Care 2 - Details avoid leaning on elbow and prolonged flexion   PTRx Self Care 1 Warmth   PTRx Self Care 1 - Details 10 mins for stiffness   PTRx Self Care 2 Icing   PTRx Self Care 2 - Details after activity for pain   PTRx Self Care 3 Scar Massage - 3 vector   PTRx Self Care 3 - Details 3 vector scar massage   PTRx Self Care 4 Ball Massage to Extensors   PTRx Self Care 4 - Details massage forearm muscles between wrist and elbow   Patient Response/Progress verbalized understanding   Neuromuscular Re-education   Neuromuscular Re-ed Minutes (66089) 5  (brief/no charge)   PTRx Neuro Re-ed 1 Median Nerve Mobility   PTRx Neuro Re-ed 1 - Details 15 reps gentle nerve \"flossing\" start with palm toward body progress to palm forward and add wrist motion as able   Skilled Intervention to increase mobility   Patient Response/Progress reports no Sx and increase mobility   Therapeutic Procedure/Exercise   Therapeutic Procedure: strength, endurance, ROM, flexibillity minutes (85662) 15   PTRx Ther Proc 1 Elbow Active Range of Motion Combined Extension and Flexion   PTRx Ther Proc 1 - Details 10 reps   PTRx Ther Proc 2 Forearm Active Range of Motion Combined Pronation and Supination   PTRx Ther Proc 2 - Details 10 reps   PTRx Ther Proc 3 Wrist Active Range of Motion Extension and Flexion Combined   PTRx Ther Proc 3 - Details 10 reps   PTRx Ther Proc 4 Active Range of Motion Combined Radial and Ulnar Deviation   PTRx Ther Proc 4 - Details 10 reps   PTRx Ther Proc 5 Hand Strengthening Gripping   PTRx Ther Proc 5 - Details 30 reps, increase resistance as able   PTRx Ther Proc 6 Wrist Strengthening Isometric Extension   PTRx Ther Proc 6 - Details 10 reps avoid pain   PTRx Ther Proc 7 Wrist Strengthening Isometric Flexion   PTRx Ther Proc 7 - Details 10 reps avoid pain   PTRx Ther Proc 8 Wrist Strengthening Isometric Radial Deviation   PTRx Ther Proc 8 - " Details 10 reps avoid pain   PTRx Ther Proc 9 Wrist Strengthening Isometric Ulnar Deviation   PTRx Ther Proc 9 - Details 10 reps avoid pain   PTRx Ther Proc 10 Wrist Strengthening Eccentric Extension with Dumb Bell   PTRx Ther Proc 10 - Details 10 reps, 5# wt, progress to isotonics   Skilled Intervention to increase ROM and strength   Patient Response/Progress demonstrated understanding   Manual Therapy   Manual Therapy Minutes (76373) 8   Manual Therapy Manual Therapy 2   Manual Therapy 1 scar mobs   Manual Therapy 1 - Details circular ane 3 vector   Manual Therapy 2 MFR   Manual Therapy 2 - Details forearm extensors, moderate pressure   Skilled Intervention to increase mobility   Patient Response/Progress tori well   Education   Learner/Method Patient;Demonstration;Pictures/Video   Education Comments printed PTRx   Total Session Time   Timed Code Treatment Minutes 28   Total Treatment Time (sum of timed and untimed services) 28     DISCHARGE  Reason for Discharge: Patient has met all goals.    Equipment Issued: NA    Discharge Plan: Patient to continue home program.    Referring Provider:  Driss Marshall

## 2023-12-07 NOTE — PROGRESS NOTES
"Orthopaedic Surgery Hand Clinic Progress Note    Patient Name: Refugio Davies  MRN: [id number]  : 1965  Date: Dec 12, 2023    Date of Surgery: 23    Surgery Performed: Excisional debridement of left extensor carpi radialis brevis muscle at lateral epicondyle     Subjective:    Update  -  Patient last seen 10/31/23. Patient has been going to OT and working on home exercise program. He had an injection on 11/10 of the ulnar nerve under ultrasound guidance. The procedure note documented complete resolution of his symptoms at that time. Since then, symptoms have improved. He has intermittent tingling and \"flare ups\". He has pain with more vigorous activity at the posteromedial elbow radiating to his dorsau ulnar hand and along his ring and small fingers. He has not returned to work but wants to do so at some capacity. He inquires of work hardening or gradual return to work would be reasonable at this time.      Update 10/31 - Patient was last seen 23. He is now 6 weeks out from surgery. He continues to have pain at the lateral elbow. This seems to radiate from the lateral elbow, over ECU into dorsal ulnar hand and 4th/5th fingers. He notes intermittent pain and tingling that occurs primarily with his therapy exercises and work around the house. He notes no significant change since the surgery. He has been attending hand therapy and working on his home exercise 2-3X/ day. He has to back off at times due to pain. Still using the brace. He is utilizing ice and heat as needed.    Objective:  There were no vitals taken for this visit.    General: alert, appropriate, NAD  HEENT: NC/AT  CV: RRR by pulse  Pulm: Unlabored on RA  MSK:  Incision c/d/I, no erythema, drainage, evidence of infection  Full elbow range of motion  No lateral elbow pain with resisted MF extension  Mild pain over dorsal ulnar forearm with resisted wrist extension  Pain with resisted small finger extension  5/5 EPL, FPL, hand " intrinsics  Sensation intact to light touch median radial, ulnar nerve distributions. Intact DUSN  +TTP cubital tunnel, positive Tinel's at the cubital tunnel with radiation to DUSN and small finger  No ulnar nerve subluxation  Warm well-perfused, capillary refill less than 2 seconds    Ultrasound / injection 11/10/23    Findings:     Ulnar and median nerve measurements given in mm square:     Ulnar nerve:     Distal upper arm: 5.3     Prox to cubital tunnel: 6.3     Within cubital tunnel: 5.4     Distal to cubital tunnel: 6.5     Distal forearm: 5.4     Wrist crease: 6.4     Median nerve:     Distal forearm: 10     Wrist crease:  8.2     No impinging lesion involving the ulnar nerve at the elbow wrist or  median nerve at the wrist.     Ultrasound guided Ulnar nerve steroid injection technique:     The benefits and risks of the procedure were discussed with the  patient including the risks of bleeding, infection, and potential  nerve damage and numbness and/or paresthesias of the left ulnar nerve  distribution. A written informed consent was obtained. Standard  timeout was performed.     Under ultrasound guidance patient's area over the left ulnar nerve was  marked with patient. This area was prepped and draped using standard  sterile fashion. Approximately 0.5 cc of 1% lidocaine was used for the  purpose of local anesthesia.     Under the ultrasound guidance, a 25-gauge 1.5 inch  needle was  advanced. Approximately 0.2 cc of 1% lidocaine was injected to along  the undersurface of the nerve. Subsequently, 2 cc of  mixture  containing 0.5 cc steroid (40mg/cc Kenalog) and 1.5 cc 1% lidocaine  was injected. The needle was then removed.     The pressure was held approximately 1-2 minutes. Sterile dressing was  applied. There was no immediate post procedural complication.     The patient rated preprocedure pain as 2/10.  After procedure this was  rated as 0/10.                                                                       Impression:   1. Uncomplicated left ulnar nerve steroid injection.  2. The patient rated preprocedure pain as 2/10.  After procedure this  was rated as 0/10.  3. Edematous appearance to the ulnar nerve at the level proximal to  and within the cubital tunnel without significant increase in size.  4. Normal sonographic appearance of the ulnar nerve at the wrist.  5. Normal sonographic appearance of the median nerve at the wrist.    Assessment/Plan:  58-year-old male status post left lateral epicondyle debridement 9/14/2023.      Unfortunately the patient has received some improvement after his surgery on the lateral elbow but not all of his symptoms. He continues to be consistent regarding his symptoms. Today he reports pain at the cubital tunnel, radiating down the dorsal ulnar forearm into DUSN distribution in small finger.    His prior EMG did not show any evidence of ulnar neuropathy. MRI of elbow showed normal ulnar nerve. However ultrasound shows edematous ulnar nerve proximal and within the cubital tunnel. His injection was also diagnostic and therapeutic with improvement in his symptoms.    This confirms the diagnosis of cubital tunnel syndrome. I discussed the diagnosis, prognosis, and treatment options. Given that his symptoms have always been aggravated by vigorous activity and work, I do not think a work hardening program would be very beneficial as it would not improve the underlying compression of his ulnar nerve. He does not want to return to work and then have to take more time off because he did not better. We did discuss conservative management with night time splinting and nerve gliding as well.    I do think he is a candidate for a cubital tunnel release so that this can be resolved and so he does not have issues returning to work in the future. The indications for surgery were discussed with the patient. The benefits, risks, and alternatives of operative management were discussed in detail  with the patient. The patient understands that the risks of surgery include, but are not limited to: infection, bleeding, injury to nearby structures (such as nerves, blood vessels, and tendons), wound healing problems, incomplete resolution of symptoms, MABC numbness or irritation, need for additional surgery, pain, stiffness, scarring, need for rehabilitation, and anesthetic complications.  Patient expressed understanding and elected to proceed with surgery. All questions were answered to the patient's satisfaction.    All questions answered.  The patient voiced understanding and agreement.  Follow-up after ultrasound and injections.    Driss Marshall MD    Hand & Upper Extremity Surgery  Department of Orthopedic Surgery  [facility]

## 2023-12-12 ENCOUNTER — OFFICE VISIT (OUTPATIENT)
Dept: ORTHOPEDICS | Facility: CLINIC | Age: 58
End: 2023-12-12
Payer: OTHER MISCELLANEOUS

## 2023-12-12 ENCOUNTER — TELEPHONE (OUTPATIENT)
Dept: ORTHOPEDICS | Facility: CLINIC | Age: 58
End: 2023-12-12
Payer: COMMERCIAL

## 2023-12-12 ENCOUNTER — VIRTUAL VISIT (OUTPATIENT)
Dept: SURGERY | Facility: CLINIC | Age: 58
End: 2023-12-12
Payer: OTHER MISCELLANEOUS

## 2023-12-12 ENCOUNTER — ANESTHESIA EVENT (OUTPATIENT)
Dept: SURGERY | Facility: CLINIC | Age: 58
End: 2023-12-12
Payer: OTHER MISCELLANEOUS

## 2023-12-12 ENCOUNTER — HOSPITAL ENCOUNTER (OUTPATIENT)
Facility: CLINIC | Age: 58
End: 2023-12-12
Attending: STUDENT IN AN ORGANIZED HEALTH CARE EDUCATION/TRAINING PROGRAM | Admitting: STUDENT IN AN ORGANIZED HEALTH CARE EDUCATION/TRAINING PROGRAM
Payer: OTHER MISCELLANEOUS

## 2023-12-12 VITALS
WEIGHT: 189 LBS | SYSTOLIC BLOOD PRESSURE: 147 MMHG | DIASTOLIC BLOOD PRESSURE: 79 MMHG | BODY MASS INDEX: 24.26 KG/M2 | HEIGHT: 74 IN

## 2023-12-12 DIAGNOSIS — Z01.818 PREOP EXAMINATION: Primary | ICD-10-CM

## 2023-12-12 DIAGNOSIS — G56.22 CUBITAL TUNNEL SYNDROME ON LEFT: ICD-10-CM

## 2023-12-12 DIAGNOSIS — G56.22 CUBITAL TUNNEL SYNDROME ON LEFT: Primary | ICD-10-CM

## 2023-12-12 PROCEDURE — 99214 OFFICE O/P EST MOD 30 MIN: CPT | Mod: 24 | Performed by: STUDENT IN AN ORGANIZED HEALTH CARE EDUCATION/TRAINING PROGRAM

## 2023-12-12 PROCEDURE — 99024 POSTOP FOLLOW-UP VISIT: CPT | Performed by: STUDENT IN AN ORGANIZED HEALTH CARE EDUCATION/TRAINING PROGRAM

## 2023-12-12 PROCEDURE — 99203 OFFICE O/P NEW LOW 30 MIN: CPT | Mod: 24 | Performed by: NURSE PRACTITIONER

## 2023-12-12 ASSESSMENT — LIFESTYLE VARIABLES: TOBACCO_USE: 1

## 2023-12-12 ASSESSMENT — ENCOUNTER SYMPTOMS: ORTHOPNEA: 0

## 2023-12-12 ASSESSMENT — PAIN SCALES - GENERAL: PAINLEVEL: MILD PAIN (2)

## 2023-12-12 NOTE — LETTER
"    2023         RE: Refugio Davies  [address]  [facility] [address]        Dear Colleague,    Thank you for referring your patient, Refugio Davies, to the [facility] [place]. Please see a copy of my visit note below.    Orthopaedic Surgery Hand Clinic Progress Note    Patient Name: Refugio Davies  MRN: [id number]  : 1965  Date: Dec 12, 2023    Date of Surgery: 23    Surgery Performed: Excisional debridement of left extensor carpi radialis brevis muscle at lateral epicondyle     Subjective:    Update  -  Patient last seen 10/31/23. Patient has been going to OT and working on home exercise program. He had an injection on 11/10 of the ulnar nerve under ultrasound guidance. The procedure note documented complete resolution of his symptoms at that time. Since then, symptoms have improved. He has intermittent tingling and \"flare ups\". He has pain with more vigorous activity at the posteromedial elbow radiating to his dorsau ulnar hand and along his ring and small fingers. He has not returned to work but wants to do so at some capacity. He inquires of work hardening or gradual return to work would be reasonable at this time.      Update 10/31 - Patient was last seen 23. He is now 6 weeks out from surgery. He continues to have pain at the lateral elbow. This seems to radiate from the lateral elbow, over ECU into dorsal ulnar hand and 4th/5th fingers. He notes intermittent pain and tingling that occurs primarily with his therapy exercises and work around the house. He notes no significant change since the surgery. He has been attending hand therapy and working on his home exercise 2-3X/ day. He has to back off at times due to pain. Still using the brace. He is utilizing ice and heat as needed.    Objective:  There were no vitals taken for this visit.    General: alert, appropriate, NAD  HEENT: NC/AT  CV: RRR by pulse  Pulm: Unlabored on RA  MSK:  Incision " c/d/I, no erythema, drainage, evidence of infection  Full elbow range of motion  No lateral elbow pain with resisted MF extension  Mild pain over dorsal ulnar forearm with resisted wrist extension  Pain with resisted small finger extension  5/5 EPL, FPL, hand intrinsics  Sensation intact to light touch median radial, ulnar nerve distributions. Intact DUSN  +TTP cubital tunnel, positive Tinel's at the cubital tunnel with radiation to DUSN and small finger  No ulnar nerve subluxation  Warm well-perfused, capillary refill less than 2 seconds    Ultrasound / injection 11/10/23    Findings:     Ulnar and median nerve measurements given in mm square:     Ulnar nerve:     Distal upper arm: 5.3     Prox to cubital tunnel: 6.3     Within cubital tunnel: 5.4     Distal to cubital tunnel: 6.5     Distal forearm: 5.4     Wrist crease: 6.4     Median nerve:     Distal forearm: 10     Wrist crease:  8.2     No impinging lesion involving the ulnar nerve at the elbow wrist or  median nerve at the wrist.     Ultrasound guided Ulnar nerve steroid injection technique:     The benefits and risks of the procedure were discussed with the  patient including the risks of bleeding, infection, and potential  nerve damage and numbness and/or paresthesias of the left ulnar nerve  distribution. A written informed consent was obtained. Standard  timeout was performed.     Under ultrasound guidance patient's area over the left ulnar nerve was  marked with patient. This area was prepped and draped using standard  sterile fashion. Approximately 0.5 cc of 1% lidocaine was used for the  purpose of local anesthesia.     Under the ultrasound guidance, a 25-gauge 1.5 inch  needle was  advanced. Approximately 0.2 cc of 1% lidocaine was injected to along  the undersurface of the nerve. Subsequently, 2 cc of  mixture  containing 0.5 cc steroid (40mg/cc Kenalog) and 1.5 cc 1% lidocaine  was injected. The needle was then removed.     The pressure was held  approximately 1-2 minutes. Sterile dressing was  applied. There was no immediate post procedural complication.     The patient rated preprocedure pain as 2/10.  After procedure this was  rated as 0/10.                                                                      Impression:   1. Uncomplicated left ulnar nerve steroid injection.  2. The patient rated preprocedure pain as 2/10.  After procedure this  was rated as 0/10.  3. Edematous appearance to the ulnar nerve at the level proximal to  and within the cubital tunnel without significant increase in size.  4. Normal sonographic appearance of the ulnar nerve at the wrist.  5. Normal sonographic appearance of the median nerve at the wrist.    Assessment/Plan:  58-year-old male status post left lateral epicondyle debridement 9/14/2023.      Unfortunately the patient has received some improvement after his surgery on the lateral elbow but not all of his symptoms. He continues to be consistent regarding his symptoms. Today he reports pain at the cubital tunnel, radiating down the dorsal ulnar forearm into DUSN distribution in small finger.    His prior EMG did not show any evidence of ulnar neuropathy. MRI of elbow showed normal ulnar nerve. However ultrasound shows edematous ulnar nerve proximal and within the cubital tunnel. His injection was also diagnostic and therapeutic with improvement in his symptoms.    This confirms the diagnosis of cubital tunnel syndrome. I discussed the diagnosis, prognosis, and treatment options. Given that his symptoms have always been aggravated by vigorous activity and work, I do not think a work hardening program would be very beneficial as it would not improve the underlying compression of his ulnar nerve. He does not want to return to work and then have to take more time off because he did not better. We did discuss conservative management with night time splinting and nerve gliding as well.    I do think he is a candidate for a  cubital tunnel release so that this can be resolved and so he does not have issues returning to work in the future. The indications for surgery were discussed with the patient. The benefits, risks, and alternatives of operative management were discussed in detail with the patient. The patient understands that the risks of surgery include, but are not limited to: infection, bleeding, injury to nearby structures (such as nerves, blood vessels, and tendons), wound healing problems, incomplete resolution of symptoms, MABC numbness or irritation, need for additional surgery, pain, stiffness, scarring, need for rehabilitation, and anesthetic complications.  Patient expressed understanding and elected to proceed with surgery. All questions were answered to the patient's satisfaction.    All questions answered.  The patient voiced understanding and agreement.  Follow-up after ultrasound and injections.    Driss Marshall MD    Hand & Upper Extremity Surgery  Department of Orthopedic Surgery  [facility]        Again, thank you for allowing me to participate in the care of your patient.        Sincerely,        Driss Marshall MD

## 2023-12-12 NOTE — TELEPHONE ENCOUNTER
Patient has been scheduled for surgery. Details are below.    Date of Surgery: 12/15/23    Approximate Arrival Time: SURGERY CENTER WILL CALL 3/4 DAYS PRIOR TO CONFIRM A TIME   Surgeon:  DR. KIMBERLYN WAY     Procedure: RELEASE CUBITAL TUNNEL,LEFT  Location: [facility] [address]  Surgery Consult: NA  PreOp Physical: 12/12/23  PostOp: 12/29/23  Packet Mailed/MyChart Sent: YES  Added to [place]: YES

## 2023-12-12 NOTE — PATIENT INSTRUCTIONS
Preparing for Your Surgery      Name:  Refugio Davies   MRN:  [id number]   :  1965   Today's Date:  2023         Arriving for surgery:  Surgery date:  12-15-23  Arrival time:  7 am    Restrictions due to COVID 19:    Please maintain social distance.  Masking is optional.      parking is available for anyone with mobility limitations or disabilities. (Monday- Friday 7 am- 5 pm)    Please come to:    [facility]  [address] [place] [id number]    Please check in on the 5th floor at the Ambulatory Surgery Center.      What can I eat or drink?    -  You may eat and drink normally until 8 hours prior to arrival  time. (Until 11 pm 23)  -  You may have clear liquids until 2 hours prior to arrival  time. (Until 5 am)    Examples of clear liquids:  Water  Clear broth  Juices (apple, white grape, white cranberry  and cider) without pulp  Noncarbonated, powder based beverages  (lemonade and Marck-Aid)  Sodas (Sprite, 7-Up, ginger ale and seltzer)  Coffee or tea (without milk or cream)  Gatorade      Which medicines can I take?    Hold Aspirin for 7 days before surgery.   Hold Multivitamins for 7 days before surgery. Hold Multivitamin starting now if you have not already, and hold until surgery.  Hold Supplements for 7 days before surgery. Hold Omega 3 Fatty Acids (Fish Oil) starting now if you have not already, and hold until surgery.  Hold Ibuprofen (Advil, Motrin) for 1 day before surgery--unless otherwise directed by surgeon.  Hold Naproxen (Aleve) for 4 days before surgery.  Acetaminophen (Tylenol) is okay to take if needed.    No alcohol or cannabis products for 24 hours prior to procedure.      -  DO NOT take the following medications the day of surgery:  Magnesium  Vitamin C (Ascorbic Acid)  Vitamin D3 (Cholecalciferol)      -  PLEASE TAKE the following medications the day of surgery:   Acetaminophen (Tylenol) if needed  Oxycodone (Roxicodone) if  needed    How do I prepare myself?  - Please take 2 showers (one the night prior to surgery and one the morning of surgery) using Scrubcare or Hibiclens soap.    Use this soap only from the neck to your toes.     Leave the soap on your skin for one minute--then rinse thoroughly.      You may use your own shampoo and conditioner. No other hair products.   - Please remove all jewelry and body piercings.  - No lotions, deodorants or fragrance.  - No makeup or fingernail polish.   - Bring your ID and insurance card.    -If you have a Deep Brain Stimulator, a Spinal Cord Stimulator, or any implanted Neuro Device, you must bring the remote to the Surgery Center.         ALL PATIENTS ARE REQUIRED TO HAVE A RESPONSIBLE ADULT TO DRIVE AND BE IN ATTENDANCE WITH THEM FOR 24 HOURS FOLLOWING SURGERY.     Covid testing policy as of 12/06/2022  Your surgeon will notify and schedule you for a COVID test if one is needed before surgery--please direct any questions or COVID symptoms to your surgeon      Questions or Concerns:    -For questions regarding the day of surgery, please contact the Ambulatory Surgery Center at [phone].    -If you have health changes between today and your surgery, please contact your surgeon.     - For questions after surgery, please contact your surgeon's office.

## 2023-12-12 NOTE — LETTER
December 12, 2023      Refugio Davies  [address]  [facility] [address]        To Whom It May Concern:    Refugio Davies  was seen on 12/12/23. Patient will be having surgery on there left elbow. Please excuse patient from work due to surgery, and 4 weeks following surgery.          Sincerely,        Driss Marshall MD

## 2023-12-12 NOTE — PROGRESS NOTES
Refugio is a 58 year old who is being evaluated via a billable video visit.      How would you like to obtain your AVS? MyChart  If the video visit is dropped, the invitation should be resent by: Send to e-mail at: [email or web address]          HPI               Review of Systems           Physical Exam
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
## 2023-12-22 ENCOUNTER — ANESTHESIA (OUTPATIENT)
Dept: SURGERY | Facility: CLINIC | Age: 58
End: 2023-12-22
Payer: OTHER MISCELLANEOUS

## 2023-12-22 ENCOUNTER — HOSPITAL ENCOUNTER (OUTPATIENT)
Facility: CLINIC | Age: 58
Discharge: HOME | End: 2023-12-22
Attending: STUDENT IN AN ORGANIZED HEALTH CARE EDUCATION/TRAINING PROGRAM | Admitting: STUDENT IN AN ORGANIZED HEALTH CARE EDUCATION/TRAINING PROGRAM
Payer: OTHER MISCELLANEOUS

## 2023-12-22 VITALS
RESPIRATION RATE: 12 BRPM | HEIGHT: 73 IN | WEIGHT: 190.3 LBS | SYSTOLIC BLOOD PRESSURE: 121 MMHG | HEART RATE: 58 BPM | BODY MASS INDEX: 25.22 KG/M2 | OXYGEN SATURATION: 99 % | TEMPERATURE: 96.9 F | DIASTOLIC BLOOD PRESSURE: 76 MMHG

## 2023-12-22 DIAGNOSIS — G56.22 CUBITAL TUNNEL SYNDROME ON LEFT: Primary | ICD-10-CM

## 2023-12-22 PROCEDURE — 250N000009 HC RX 250: Performed by: NURSE ANESTHETIST, CERTIFIED REGISTERED

## 2023-12-22 PROCEDURE — 250N000013 HC RX MED GY IP 250 OP 250 PS 637: Performed by: ANESTHESIOLOGY

## 2023-12-22 PROCEDURE — 250N000011 HC RX IP 250 OP 636: Mod: JZ | Performed by: NURSE ANESTHETIST, CERTIFIED REGISTERED

## 2023-12-22 PROCEDURE — 710N000012 HC RECOVERY PHASE 2, PER MINUTE: Performed by: STUDENT IN AN ORGANIZED HEALTH CARE EDUCATION/TRAINING PROGRAM

## 2023-12-22 PROCEDURE — 250N000009 HC RX 250: Performed by: STUDENT IN AN ORGANIZED HEALTH CARE EDUCATION/TRAINING PROGRAM

## 2023-12-22 PROCEDURE — 272N000001 HC OR GENERAL SUPPLY STERILE: Performed by: STUDENT IN AN ORGANIZED HEALTH CARE EDUCATION/TRAINING PROGRAM

## 2023-12-22 PROCEDURE — 258N000003 HC RX IP 258 OP 636: Performed by: ANESTHESIOLOGY

## 2023-12-22 PROCEDURE — 999N000141 HC STATISTIC PRE-PROCEDURE NURSING ASSESSMENT: Performed by: STUDENT IN AN ORGANIZED HEALTH CARE EDUCATION/TRAINING PROGRAM

## 2023-12-22 PROCEDURE — 250N000011 HC RX IP 250 OP 636: Performed by: STUDENT IN AN ORGANIZED HEALTH CARE EDUCATION/TRAINING PROGRAM

## 2023-12-22 PROCEDURE — 250N000025 HC SEVOFLURANE, PER MIN: Performed by: STUDENT IN AN ORGANIZED HEALTH CARE EDUCATION/TRAINING PROGRAM

## 2023-12-22 PROCEDURE — 710N000009 HC RECOVERY PHASE 1, LEVEL 1, PER MIN: Performed by: STUDENT IN AN ORGANIZED HEALTH CARE EDUCATION/TRAINING PROGRAM

## 2023-12-22 PROCEDURE — 360N000075 HC SURGERY LEVEL 2, PER MIN: Performed by: STUDENT IN AN ORGANIZED HEALTH CARE EDUCATION/TRAINING PROGRAM

## 2023-12-22 PROCEDURE — 370N000017 HC ANESTHESIA TECHNICAL FEE, PER MIN: Performed by: STUDENT IN AN ORGANIZED HEALTH CARE EDUCATION/TRAINING PROGRAM

## 2023-12-22 RX ORDER — HYDROMORPHONE HCL IN WATER/PF 6 MG/30 ML
0.4 PATIENT CONTROLLED ANALGESIA SYRINGE INTRAVENOUS EVERY 5 MIN PRN
Status: DISCONTINUED | OUTPATIENT
Start: 2023-12-22 | End: 2023-12-22 | Stop reason: HOSPADM

## 2023-12-22 RX ORDER — OXYCODONE HYDROCHLORIDE 5 MG/1
10 TABLET ORAL
Status: DISCONTINUED | OUTPATIENT
Start: 2023-12-22 | End: 2023-12-22 | Stop reason: HOSPADM

## 2023-12-22 RX ORDER — ONDANSETRON 2 MG/ML
4 INJECTION INTRAMUSCULAR; INTRAVENOUS EVERY 30 MIN PRN
Status: DISCONTINUED | OUTPATIENT
Start: 2023-12-22 | End: 2023-12-22 | Stop reason: HOSPADM

## 2023-12-22 RX ORDER — SODIUM CHLORIDE, SODIUM LACTATE, POTASSIUM CHLORIDE, CALCIUM CHLORIDE 600; 310; 30; 20 MG/100ML; MG/100ML; MG/100ML; MG/100ML
INJECTION, SOLUTION INTRAVENOUS CONTINUOUS
Status: DISCONTINUED | OUTPATIENT
Start: 2023-12-22 | End: 2023-12-22 | Stop reason: HOSPADM

## 2023-12-22 RX ORDER — ONDANSETRON 2 MG/ML
INJECTION INTRAMUSCULAR; INTRAVENOUS PRN
Status: DISCONTINUED | OUTPATIENT
Start: 2023-12-22 | End: 2023-12-22

## 2023-12-22 RX ORDER — FENTANYL CITRATE 50 UG/ML
INJECTION, SOLUTION INTRAMUSCULAR; INTRAVENOUS PRN
Status: DISCONTINUED | OUTPATIENT
Start: 2023-12-22 | End: 2023-12-22

## 2023-12-22 RX ORDER — MAGNESIUM HYDROXIDE 1200 MG/15ML
LIQUID ORAL PRN
Status: DISCONTINUED | OUTPATIENT
Start: 2023-12-22 | End: 2023-12-22 | Stop reason: HOSPADM

## 2023-12-22 RX ORDER — DEXAMETHASONE SODIUM PHOSPHATE 4 MG/ML
INJECTION, SOLUTION INTRA-ARTICULAR; INTRALESIONAL; INTRAMUSCULAR; INTRAVENOUS; SOFT TISSUE PRN
Status: DISCONTINUED | OUTPATIENT
Start: 2023-12-22 | End: 2023-12-22

## 2023-12-22 RX ORDER — OXYCODONE HYDROCHLORIDE 5 MG/1
5 TABLET ORAL EVERY 6 HOURS PRN
Qty: 6 TABLET | Refills: 0 | Status: SHIPPED | OUTPATIENT
Start: 2023-12-22 | End: 2023-12-25

## 2023-12-22 RX ORDER — GLYCOPYRROLATE 0.2 MG/ML
INJECTION, SOLUTION INTRAMUSCULAR; INTRAVENOUS PRN
Status: DISCONTINUED | OUTPATIENT
Start: 2023-12-22 | End: 2023-12-22

## 2023-12-22 RX ORDER — HYDROMORPHONE HCL IN WATER/PF 6 MG/30 ML
0.2 PATIENT CONTROLLED ANALGESIA SYRINGE INTRAVENOUS EVERY 5 MIN PRN
Status: DISCONTINUED | OUTPATIENT
Start: 2023-12-22 | End: 2023-12-22 | Stop reason: HOSPADM

## 2023-12-22 RX ORDER — PROPOFOL 10 MG/ML
INJECTION, EMULSION INTRAVENOUS CONTINUOUS PRN
Status: DISCONTINUED | OUTPATIENT
Start: 2023-12-22 | End: 2023-12-22

## 2023-12-22 RX ORDER — OXYCODONE HYDROCHLORIDE 5 MG/1
5 TABLET ORAL
Status: COMPLETED | OUTPATIENT
Start: 2023-12-22 | End: 2023-12-22

## 2023-12-22 RX ORDER — ONDANSETRON 4 MG/1
4 TABLET, ORALLY DISINTEGRATING ORAL EVERY 30 MIN PRN
Status: DISCONTINUED | OUTPATIENT
Start: 2023-12-22 | End: 2023-12-22 | Stop reason: HOSPADM

## 2023-12-22 RX ORDER — LIDOCAINE HYDROCHLORIDE 20 MG/ML
INJECTION, SOLUTION INFILTRATION; PERINEURAL PRN
Status: DISCONTINUED | OUTPATIENT
Start: 2023-12-22 | End: 2023-12-22

## 2023-12-22 RX ORDER — PROPOFOL 10 MG/ML
INJECTION, EMULSION INTRAVENOUS PRN
Status: DISCONTINUED | OUTPATIENT
Start: 2023-12-22 | End: 2023-12-22

## 2023-12-22 RX ORDER — FENTANYL CITRATE 50 UG/ML
25 INJECTION, SOLUTION INTRAMUSCULAR; INTRAVENOUS EVERY 5 MIN PRN
Status: DISCONTINUED | OUTPATIENT
Start: 2023-12-22 | End: 2023-12-22 | Stop reason: HOSPADM

## 2023-12-22 RX ORDER — LIDOCAINE 40 MG/G
CREAM TOPICAL
Status: DISCONTINUED | OUTPATIENT
Start: 2023-12-22 | End: 2023-12-22 | Stop reason: HOSPADM

## 2023-12-22 RX ORDER — FENTANYL CITRATE 50 UG/ML
50 INJECTION, SOLUTION INTRAMUSCULAR; INTRAVENOUS EVERY 5 MIN PRN
Status: DISCONTINUED | OUTPATIENT
Start: 2023-12-22 | End: 2023-12-22 | Stop reason: HOSPADM

## 2023-12-22 RX ADMIN — FENTANYL CITRATE 100 MCG: 50 INJECTION INTRAMUSCULAR; INTRAVENOUS at 10:04

## 2023-12-22 RX ADMIN — PROPOFOL 75 MCG/KG/MIN: 10 INJECTION, EMULSION INTRAVENOUS at 10:09

## 2023-12-22 RX ADMIN — ONDANSETRON 4 MG: 2 INJECTION INTRAMUSCULAR; INTRAVENOUS at 10:42

## 2023-12-22 RX ADMIN — LIDOCAINE HYDROCHLORIDE 50 MG: 20 INJECTION, SOLUTION INFILTRATION; PERINEURAL at 10:04

## 2023-12-22 RX ADMIN — OXYCODONE HYDROCHLORIDE 5 MG: 5 TABLET ORAL at 11:53

## 2023-12-22 RX ADMIN — GLYCOPYRROLATE 0.1 MG: 0.2 INJECTION, SOLUTION INTRAMUSCULAR; INTRAVENOUS at 10:13

## 2023-12-22 RX ADMIN — SODIUM CHLORIDE, POTASSIUM CHLORIDE, SODIUM LACTATE AND CALCIUM CHLORIDE: 600; 310; 30; 20 INJECTION, SOLUTION INTRAVENOUS at 08:31

## 2023-12-22 RX ADMIN — MIDAZOLAM 2 MG: 1 INJECTION INTRAMUSCULAR; INTRAVENOUS at 09:58

## 2023-12-22 RX ADMIN — DEXAMETHASONE SODIUM PHOSPHATE 8 MG: 4 INJECTION, SOLUTION INTRA-ARTICULAR; INTRALESIONAL; INTRAMUSCULAR; INTRAVENOUS; SOFT TISSUE at 10:04

## 2023-12-22 RX ADMIN — PROPOFOL 200 MG: 10 INJECTION, EMULSION INTRAVENOUS at 10:04

## 2023-12-22 ASSESSMENT — ACTIVITIES OF DAILY LIVING (ADL)
ADLS_ACUITY_SCORE: 36

## 2023-12-22 ASSESSMENT — LIFESTYLE VARIABLES: TOBACCO_USE: 1

## 2023-12-22 NOTE — ANESTHESIA POSTPROCEDURE EVALUATION
Patient: Refugio Davies    Procedure: Procedure(s):  RELEASE, CUBITAL TUNNEL LEFT       Anesthesia Type:  General    Note:  Disposition: Outpatient   Postop Pain Control: Uneventful            Sign Out: Well controlled pain   PONV: No   Neuro/Psych: Uneventful            Sign Out: Acceptable/Baseline neuro status   Airway/Respiratory: Uneventful            Sign Out: Acceptable/Baseline resp. status   CV/Hemodynamics: Uneventful            Sign Out: Acceptable CV status; No obvious hypovolemia; No obvious fluid overload   Other NRE: NONE   DID A NON-ROUTINE EVENT OCCUR? No           Last vitals:  Vitals Value Taken Time   /81 12/22/23 1205   Temp 97.1  F (36.2  C) 12/22/23 1130   Pulse 67 12/22/23 1218   Resp 17 12/22/23 1218   SpO2 98 % 12/22/23 1225   Vitals shown include unfiled device data.    Electronically Signed By: Arturo Diaz MD  December 22, 2023  3:40 PM
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
## 2023-12-22 NOTE — OP NOTE
Patient: Refugio Davies  : 1965  MRN: [id number]    DATE OF OPERATION: 2023      OPERATIVE REPORT       PREOPERATIVE DIAGNOSIS:  Left cubital tunnel syndrome     POSTOPERATIVE DIAGNOSIS:  Left cubital tunnel syndrome     PROCEDURE:  Left in-situ cubital tunnel release     SURGEON:  Driss Marshall MD     ASSISTANT(S):  Griffin Carroll MD    ANESTHESIA:  General     IMPLANTS:   None    ESTIMATED BLOOD LOSS:  2cc    DVT PROPHYLAXIS:  SCDs    TOURNIQUET TIME:  25 minutes     SPECIMENS REMOVED:  None    INTRAOPERATIVE FINDINGS:  Compressed ulnar nerve at the cubital tunnel, stable with elbow flexion    COMPLICATIONS:  None    DISPOSITION:  Stable to PACU.     INDICATIONS:  Refugio Davies is a 58 year old male with longstanding symptoms of pain in the left elbow radiating to the dorsal ulnar hand and fourth and fifth fingers.  The patient previously underwent excisional debridement of his left extensor carpi radialis brevis muscle for lateral epicondylitis and had partial improvement.  The distribution of his ulnar nerve symptoms were initially not classic without any MRI or EMG findings suggestive of cubital tunnel syndrome.  However due to his persistent symptoms after the prior surgery, I obtained an ultrasound as well as an ultrasound-guided injection of his left ulnar nerve at the swelling the ulnar nerve.  He also experienced improvement in his symptoms after the injection, however this has begun to wear off.      The indications for surgery were discussed with the patient. The benefits, risks, and alternatives of operative management were discussed in detail with the patient. The patient understands that the risks of surgery include, but are not limited to: infection, bleeding, injury to nearby structures (such as nerves, blood vessels, and tendons), wound healing problems, incomplete resolution of symptoms, MABC numbness or irritation, need for additional surgery, pain, stiffness, scarring,  need for rehabilitation, and anesthetic complications.  Patient expressed understanding and elected to proceed with surgery. All questions were answered to the patient's satisfaction.    Consent was obtained for the procedure.     DESCRIPTION OF PROCEDURE IN DETAIL:  The patient was seen in the preoperative care unit. Patient identity, consent, procedure to be performed, and operative site were verified with the patient. The left upper extremity was marked.     The patient was brought to the operating room and placed supine on the operating room table. The left upper extremity was placed on an armboard. SCDs were placed on the bilateral lower extremities. A well-padded tourniquet was placed on the upper arm.     General anesthesia was induced.     The left upper extremity was prepped and draped in the usual sterile fashion. A timeout was performed confirming the correct patient, procedure, and operative site. All were in agreement.    10 cc of 1: 1 mixture of 1% lidocaine 1: 100,000 epinephrine and 0.5% bupivacaine was infiltrated in the skin and subcutaneous tissues over the cubital tunnel. The limb was exsanguinated and tourniquet inflated to 250 mmHg.  A curvilinear incision was made directly over the cubital tunnel extending proximally along the course of the ulnar nerve and distally over the FCU.  Blunt dissection was carried down through the skin and subcutaneous tissues to the medial epicondyle.  Branches of the medial antebrachial cutaneous nerve were identified and protected throughout the remainder of the case.  The ulnar nerve was identified proximal to the cubital tunnel posterior to the intermuscular septum and followed distally into the cubital tunnel.  We encountered an anconeus epitrochlear's muscle directly over the cubital tunnel which was released off its attachment on the medial condyle and reflected posteriorly.  Gonzalez's ligament was incised sharply.  The ulnar nerve was then followed distally  into the FCU.  The FCU fascia was released and the 2 heads of the FCU were developed bluntly.  The motor branches of the ulnar nerve to the FCU were identified and protected.  Distally, the deep flexor pronator fascia was also released under direct visualization.  Attention was then turned to the ulnar nerve proximally.  It was followed along the intermuscular septum until the arcade of [place] was identified.  These fibers were released under direct visualization.  A strip of the intermuscular septum was excised.  A complete ulnar nerve release was performed and the nerve was found to have excellent excursion.  Mild hourglass deformity was noted of the ulnar nerve.    The elbow was taken through a range of motion.  The ulnar nerve was found to be stable.  As such, the nerve was left in situ and no transposition was performed.    The wound was then copiously irrigated.  The tourniquet was deflated.  Hemostasis was achieved with bipolar cautery.  The deep dermal tissues were then closed with 3-0 Vicryl interrupted buried sutures.  The skin was then closed with running 4-0 Monocryl in subcuticular fashion.    Steri-Strips were applied.  A sterile dressing was then applied.    All needle and sponge counts were correct at the end of the procedure. There were no complications.     The patient was awoken from anesthesia and taken to the postoperative recovery unit in stable condition.     I was present and scrubbed for the entire procedure.     POSTOPERATIVE PLAN:  The patient will be discharged home. The patient has been instructed to be 1 pound coffee cup weightbearing.  He may begin immediate gentle elbow range of motion.  I have instructed the patient on finger range of motion.  The patient will return to clinic in 10-14 days for a wound check.  He may begin to advance his activities once his wound is fully healed.  I will see him back in clinic at the 4 to 6-week sherley to review his symptoms and  clear him to return  to work for full duty.    Driss Marshall MD     Hand, Upper Extremity & Microvascular Surgery  Department of Orthopedic Surgery  [facility]

## 2023-12-22 NOTE — H&P
"Interval H&P Update:    Patient is planned for left cubital tunnel release under general anesthesia today,.    Preoperative history and physical dated 12/12/2023 reviewed.  Patient is optimized and cleared for her surgery today.  I agree with the findings and there are no changes.    Preoperative Exam:  Blood pressure (!) 145/87, pulse 89, temperature 98.4  F (36.9  C), temperature source Temporal, resp. rate 18, height 1.854 m (6' 1\"), weight 86.3 kg (190 lb 4.8 oz), SpO2 97%.    HEENT: Normocephalic atraumatic  CV: RRR, no murmurs gallops or rubs  Pulm: Breathing unlabored on room, clear to auscultation bilaterally  MSK:  Consistent with cubital tunnel syndrome on the left    Plan:  Will proceed with left cubital tunnel release as scheduled.    Driss Marshall MD    Hand & Upper Extremity Surgery  Department of Orthopedic Surgery  [facility]    "

## 2023-12-22 NOTE — DISCHARGE INSTRUCTIONS
Procedure Performed: Left cubital tunnel release  Attending Surgeon: Driss Marshall MD  Date: 12/22/2023    DIAGNOSIS  1. Cubital tunnel syndrome on left        MEDICATIONS   Resume all home medications as directed unless otherwise instructed during this hospitalization. If there is any question, double check with your primary care provider.  Start new discharge medications as directed.    Take 1-2 tablets of extra strength Tylenol 500 mg every 6 hours. You may also take 400-600 mg of ibuprofen every 6 hours.    For breakthrough pain use narcotic pain medication as prescribed.    Do not drive or operate machinery while taking narcotic pain medications.   If you are taking other Tylenol containing medicines at home, be sure NOT to exceed 4 gram's (4000 milligrams) of Tylenol per day.   Do NOT exceed 2400 mg of ibuprofen per day.  If you are taking pain medications, be sure to take Colace (docusate sodium) as well to prevent constipation. If constipated, try adding another cathartic or enema.  If nausea and vomiting, call the hospital or seek medical attention.    ACTIVITY   Weight bearing: Non-weight bearing to operative extremity.    DIET  Resume same diet prior to your hospital admission.    WOUND   Leave dressing on until you are seen in clinic for your follow up visit.   Watch for signs and symptoms of infection of your wounds including; pain, redness, swelling, drainage or fever.  If you notice any of these symptoms please call or seek medical attention.    Keep wound clean, dry, and intact.  Do not submerge wounds in water until they are healed. No baths, soaking, swimming, or prolonged water exposure for 4 weeks after surgery.    RETURN   Follow-up with Orthopedic Clinic as directed.     Future Appointments   Date Time Provider Department Center   1/5/2024  3:20 PM Lacey Langley PA-C [facility]       Call the [facility] at [phone] during business hours for any symptoms such  as:    * Fevers with Temperature greater than 101.5 degrees.   * Pus drainage from wound site.   * Severe pain, not controlled by medication.   * Persistent nausea, vomiting and inablility to tolerate fluids.    If you are receiving care in [place], you may call the Orthopedic clinic at [phone].    FOR URGENT PROBLEMS ONLY, after hours or on weekends call the hospital  at [phone] and ask to speak with the orthopedic resident on call.    GENERAL ANESTHESIA OR SEDATION ADULT DISCHARGE INSTRUCTIONS   SPECIAL PRECAUTIONS FOR 24 HOURS AFTER SURGERY    IT IS NOT UNUSUAL TO FEEL LIGHT-HEADED OR FAINT, UP TO 24 HOURS AFTER SURGERY OR WHILE TAKING PAIN MEDICATION.  IF YOU HAVE THESE SYMPTOMS; SIT FOR A FEW MINUTES BEFORE STANDING AND HAVE SOMEONE ASSIST YOU WHEN YOU GET UP TO WALK OR USE THE BATHROOM.    YOU SHOULD REST AND RELAX FOR THE NEXT 24 HOURS AND YOU MUST MAKE ARRANGEMENTS TO HAVE SOMEONE STAY WITH YOU FOR AT LEAST 24 HOURS AFTER YOUR DISCHARGE.  AVOID HAZARDOUS AND STRENUOUS ACTIVITIES.  DO NOT MAKE IMPORTANT DECISIONS FOR 24 HOURS.    DO NOT DRIVE ANY VEHICLE OR OPERATE MECHANICAL EQUIPMENT FOR 24 HOURS FOLLOWING THE END OF YOUR SURGERY.  EVEN THOUGH YOU MAY FEEL NORMAL, YOUR REACTIONS MAY BE AFFECTED BY THE MEDICATION YOU HAVE RECEIVED.    DO NOT DRINK ALCOHOLIC BEVERAGES FOR 24 HOURS FOLLOWING YOUR SURGERY.    DRINK CLEAR LIQUIDS (APPLE JUICE, GINGER ALE, 7-UP, BROTH, ETC.).  PROGRESS TO YOUR REGULAR DIET AS YOU FEEL ABLE.    YOU MAY HAVE A DRY MOUTH, A SORE THROAT, MUSCLES ACHES OR TROUBLE SLEEPING.  THESE SHOULD GO AWAY AFTER 24 HOURS.    CALL YOUR DOCTOR FOR ANY OF THE FOLLOWING:  SIGNS OF INFECTION (FEVER, GROWING TENDERNESS AT THE SURGERY SITE, A LARGE AMOUNT OF DRAINAGE OR BLEEDING, SEVERE PAIN, FOUL-SMELLING DRAINAGE, REDNESS OR SWELLING.    IT HAS BEEN OVER 8 TO 10 HOURS SINCE SURGERY AND YOU ARE STILL NOT ABLE TO URINATE (PASS WATER).

## 2023-12-22 NOTE — ANESTHESIA CARE TRANSFER NOTE
Patient: Refugio Davies    Procedure: Procedure(s):  RELEASE, CUBITAL TUNNEL LEFT       Diagnosis: Cubital tunnel syndrome on left [G56.22]  Diagnosis Additional Information: No value filed.    Anesthesia Type:   General     Note:    Oropharynx: oropharynx clear of all foreign objects and spontaneously breathing  Level of Consciousness: drowsy  Oxygen Supplementation: face mask    Independent Airway: airway patency satisfactory and stable  Dentition: dentition unchanged  Vital Signs Stable: post-procedure vital signs reviewed and stable  Report to RN Given: handoff report given  Patient transferred to: PACU    Handoff Report: Identifed the Patient, Identified the Reponsible Provider, Reviewed the pertinent medical history, Discussed the surgical course, Reviewed Intra-OP anesthesia mangement and issues during anesthesia, Set expectations for post-procedure period and Allowed opportunity for questions and acknowledgement of understanding      Vitals:  Vitals Value Taken Time   BP     Temp     Pulse     Resp     SpO2         Electronically Signed By: SATNAM Deleon CRNA  December 22, 2023  11:17 AM

## 2023-12-22 NOTE — ANESTHESIA PROCEDURE NOTES
Airway       Patient location during procedure: OR       Procedure Start/Stop Times: 12/22/2023 10:06 AM  Staff -        CRNA: Krys Crespo APRN CRNA       Performed By: CRNA  Consent for Airway        Urgency: elective  Indications and Patient Condition       Indications for airway management: marcia-procedural       Induction type:intravenous       Mask difficulty assessment: 1 - vent by mask    Final Airway Details       Final airway type: supraglottic airway    Supraglottic Airway Details        Type: LMA       Brand: I-Gel       LMA size: 5    Post intubation assessment        Placement verified by: capnometry        Number of attempts at approach: 1       Secured with: commercial tube zuleta       Ease of procedure: easy       Dentition: Intact and Unchanged    Medication(s) Administered   Medication Administration Time: 12/22/2023 10:06 AM

## 2024-01-05 ENCOUNTER — OFFICE VISIT (OUTPATIENT)
Dept: ORTHOPEDICS | Facility: CLINIC | Age: 59
End: 2024-01-05
Payer: OTHER MISCELLANEOUS

## 2024-01-05 DIAGNOSIS — Z98.890 POST-OPERATIVE STATE: Primary | ICD-10-CM

## 2024-01-05 DIAGNOSIS — G56.22 ULNAR NEUROPATHY AT ELBOW OF LEFT UPPER EXTREMITY: ICD-10-CM

## 2024-01-05 PROCEDURE — 99024 POSTOP FOLLOW-UP VISIT: CPT | Performed by: PHYSICIAN ASSISTANT

## 2024-01-05 NOTE — NURSING NOTE
Reason For Visit:   Chief Complaint   Patient presents with    Surgical Followup     Post op Left in-situ cubital tunnel release  DOS:  12/22/23       Primary MD: Vito Maharaj  Ref. MD: Francis    Age: 58 year old    ?  No      There were no vitals taken for this visit.      Pain Assessment  Patient Currently in Pain: Yes  0-10 Pain Scale: 2  Primary Pain Location: Elbow  Pain Descriptors: Aching, Dull, Intermittent    Hand Dominance Evaluation  Hand Dominance: Right          QuickDASH Assessment      1/3/2024     8:47 AM   QuickDASH Main   1. Open a tight or new jar Mild difficulty   2. Do heavy household chores (e.g., wash walls, floors) Mild difficulty   3. Carry a shopping bag or briefcase Mild difficulty   4. Wash your back No difficulty   5. Use a knife to cut food No difficulty   6. Recreational activities in which you take some force or impact through your arm, shoulder or hand (e.g., golf, hammering, tennis, etc.) Unable to answer   7. During the past week, to what extent has your arm, shoulder or hand problem interfered with your normal social activities with family, friends, neighbours or groups Slightly   8. During the past week, were you limited in your work or other regular daily activities as a result of your arm, shoulder or hand problem Slightly limited   9. Arm, shoulder or hand pain Mild   10.Tingling (pins and needles) in your arm,shoulder or hand None   11. During the past week, how much difficulty have you had sleeping because of the pain in your arm, shoulder or hand No difficulty   Quickdash Ability Score 11.36          Current Outpatient Medications   Medication Sig Dispense Refill    acetaminophen (TYLENOL) 500 MG tablet Take 500-1,000 mg by mouth every 6 hours as needed for mild pain      Ascorbic Acid (VITAMIN C) 500 MG CHEW Take 2 tablets by mouth every morning      ibuprofen (ADVIL/MOTRIN) 200 MG tablet Take 600 mg by mouth every 4 hours as needed for mild pain       magnesium oxide 400 MG CAPS Take 1 capsule by mouth 2 times daily      MULTIPLE VITAMIN PO Take 1 tablet by mouth every morning      Omega-3 Fatty Acids (FISH OIL) 1200 MG capsule Take 1,200 mg by mouth every morning      vitamin D3 (CHOLECALCIFEROL) 50 mcg (2000 units) tablet Take 2 tablets by mouth every morning         No Known Allergies    DEVIKA CASTILLO, ATC

## 2024-01-05 NOTE — PROGRESS NOTES
Date of Service: Jan 5, 2024    Chief Complaint: Post operative follow up.     Date of Surgery: 12/22/23    Procedure Performed: Left in-situ cubital tunnel release      Interval events: Refugio Davies is a 58 year old male who presents today for a postoperative follow up. He is doing well. Pain well controlled. N/T improved post operatively.     The past medical history was reviewed updated in the EMR. This includes medications, surgeries, social history, and review of systems.    Physical examination:  Well-developed, well-nourished and in no acute distress.  Alert and oriented to surroundings.  On examination of the  left elbow, incision is well-healed. There is no erythema, drainage, or dehiscence. Swelling is Mild. Sensation is intact in median, radial and ulnar nerve distributions. Fires EPL, FPL, IO with 5/5. Patient can actively flex and extend all digits and thumb.  Fingers are warm and well-perfused.    Assessment: 58 year old male s/p left in-situ cubital tunnel release, progressing appropriately.     Plan:  Can start to get the incision wet. No submerging until the wound is fully healed. Start gentle active ROM. Once incision is healed can progress activities. Follow up at 4-6 weeks post op.     PRINCE ALICIA PA-C  Orthopaedic Surgery

## 2024-01-23 ENCOUNTER — OFFICE VISIT (OUTPATIENT)
Dept: ORTHOPEDICS | Facility: CLINIC | Age: 59
End: 2024-01-23
Payer: OTHER MISCELLANEOUS

## 2024-01-23 DIAGNOSIS — G56.22 CUBITAL TUNNEL SYNDROME ON LEFT: Primary | ICD-10-CM

## 2024-01-23 PROCEDURE — 99024 POSTOP FOLLOW-UP VISIT: CPT | Performed by: STUDENT IN AN ORGANIZED HEALTH CARE EDUCATION/TRAINING PROGRAM

## 2024-01-23 NOTE — LETTER
January 23, 2024      Refugio Davies  [address] [place] [address]        To Whom It May Concern:    Refugio Davies was seen in our clinic. 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       Sincerely,      Driss Marshall

## 2024-01-23 NOTE — PROGRESS NOTES
Orthopaedic Surgery Hand Clinic Progress Note    Patient Name: Refugio Davies  MRN: [id number]  : 1965  Date: 2024    Date of Surgery: 23    Surgery Performed: left In-situ Cubital tunnel release     Subjective:  Mr. Refugio Davies is a 58 year old male who returns s/p 6 weeks cubital tunnel release, patient last seen on 24 at [facility] by Lacey WALKER. Today patient reports that he is improving. He intermittently has swelling about the medial elbow with more vigorous activity, but this resolves.  still has some pain at times of medial elbow. He has not noticed the radiating symptoms to the dorsal ulnar hand or ring/small fingers since the surgery.    Objective:  There were no vitals taken for this visit.    General: alert, appropriate, NAD  HEENT: NC/AT  CV: RRR by pulse  Pulm: Unlabored on RA  MSK:  Incision is clean and intact  No erythema, drainage, infection  Full elbow range of motion  Minimal tenderness along the incision over the cubital tunnel  Sensation is intact to light touch in the median radial ulnar nerve distributions  5/5 EPL, FPL, hand intrinsics  Warm well-perfused, capillary refill less than 2 seconds    Assessment/Plan:   58-year-old male status post left in situ cubital tunnel release 2023    Patient appears to be progressing appropriately.  The radiating symptoms to the dorsal ulnar hand have resolved.    Patient feels ready to begin work hardening.  His work offers a full time position that is essentially modified light duty and can allow him to build up strength and work at his own pace ([employer]).    Work note provided for this.  Follow-up with me in 6 weeks for recheck.  Patient may be clear to return to full duty at that time.    Driss Marshall MD    Hand & Upper Extremity Surgery  Department of Orthopedic Surgery  [facility]

## 2024-01-23 NOTE — LETTER
2024         RE: Refugio Davies  [address]  [place] [address]        Dear Colleague,    Thank you for referring your patient, Refugio Davies, to the [facility] [place]. Please see a copy of my visit note below.    Orthopaedic Surgery Hand Clinic Progress Note    Patient Name: Reufgio Davies  MRN: [id number]  : 1965  Date: 2024    Date of Surgery: 23    Surgery Performed: left In-situ Cubital tunnel release     Subjective:  Mr. Refugio Davies is a 58 year old male who returns s/p 6 weeks cubital tunnel release, patient [place] [facility] [place] Lacey WALKER. Today patient reports that he is improving. He intermittently has swelling about the medial elbow with more vigorous activity, but this resolves.  still has some pain at times of medial elbow. He has not noticed the radiating symptoms to the dorsal ulnar hand or ring/small fingers since the surgery.    Objective:  There were no vitals taken for this visit.    General: alert, appropriate, NAD  HEENT: NC/AT  CV: RRR by pulse  Pulm: Unlabored on RA  MSK:  Incision is clean and intact  No erythema, drainage, infection  Full elbow range of motion  Minimal tenderness along the incision over the cubital tunnel  Sensation is intact to light touch in the median radial ulnar nerve distributions  5/5 EPL, FPL, hand intrinsics  Warm well-perfused, capillary refill less than 2 seconds    Assessment/Plan:   58-year-old male status post left in situ cubital tunnel release 2023    Patient appears to be progressing appropriately.  The radiating symptoms to the dorsal ulnar hand have resolved.    Patient feels ready to begin work hardening.  His work offers a full time position that is essentially modified light duty and can allow him to build up strength and work at his own pace ([employer]).    Work note provided for this.  Follow-up with me in 6 weeks for recheck.  Patient may be clear to  return to full duty at that time.    Driss Marshall MD    Hand & Upper Extremity Surgery  Department of Orthopedic Surgery  [facility]        Again, thank you for allowing me to participate in the care of your patient.        Sincerely,        Driss Marshall MD

## 2024-02-27 ENCOUNTER — TELEPHONE (OUTPATIENT)
Dept: ORTHOPEDICS | Facility: CLINIC | Age: 59
End: 2024-02-27
Payer: COMMERCIAL

## 2024-02-27 NOTE — TELEPHONE ENCOUNTER
[facility] Call Center    Phone Message    May a detailed message be left on voicemail: yes     Reason for Call: Anita is calling Regarding Patient Work Restrictions . Please call Her     Action Taken: Message routed to:  Other: [facility]    Travel Screening: Not Applicable                                                                 d

## 2024-02-27 NOTE — TELEPHONE ENCOUNTER
Discussed with ATC. Patient to continue with work hardening and f/unit(s) on 3/12/24 as previously planned.     Left message for Anita, , and she was informed of the above. Asked that she call back for further questions.     KELSEA Patel RN

## 2024-02-27 NOTE — TELEPHONE ENCOUNTER
Phone call to Anita,  for work comp, to get clarification of what she needs.     She states patient's employer, [employer], provides work hardening on the job at a building called [employer]. Patient has been working there since his last visit.   Now the city is asking for more specific work restrictions, such as weight restrictions or what his functional capabilities are. 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 Patient prefers to stay in his current work hardening until his follow up visit on 3/12/24 as previously planned due to having some continued pain.     Will discuss with provider and get back with her.     KELSEA Patel RN

## 2024-03-08 NOTE — PROGRESS NOTES
Orthopaedic Surgery Hand Clinic Progress Note    Patient Name: Refugio Davies  MRN: [id number]  : 1965  Date: Mar 12, 2024    Date of Surgery: 23    Surgery Performed: left In-situ Cubital tunnel release     Update 3/12/24: Patient is now almost 12 weeks status post above.  He is doing quite well.  He has completed work hardening and feels strong and ready to return to work full-time.  He reports no significant issues with the left upper extremity.  He very occasionally experiences some medial elbow soreness and radiating symptoms in the dorsal ulnar hand, however he is much improved compared to before the surgery.        Objective:  There were no vitals taken for this visit.    General: alert, appropriate, NAD  HEENT: NC/AT  CV: RRR by pulse  Pulm: Unlabored on RA  MSK:  Incision is clean and intact  No erythema, drainage, infection  Full elbow range of motion  No tenderness along the incision over the cubital tunnel, negative Tinel's  Sensation is intact to light touch in the median radial ulnar nerve distributions  5/5 EPL, FPL, hand intrinsics, 5/5 FDP/5  Warm well-perfused, capillary refill less than 2 seconds    Assessment/Plan:   58-year-old male status post left in situ cubital tunnel release 2023    Patient is cleared to return to full duty without restrictions.  Work note provided.    He asked if it is possible this condition could recur.  I advised that it is infrequent, but possible.  If he has any return of symptoms, he may return to see me. If needed, next step would be an ulnar nerve transposition.  Otherwise he may follow-up as needed.    All questions appear the patient voiced understanding and agreement.    Driss Marshall MD    Hand & Upper Extremity Surgery  Department of Orthopedic Surgery  [facility]

## 2024-03-12 ENCOUNTER — OFFICE VISIT (OUTPATIENT)
Dept: ORTHOPEDICS | Facility: CLINIC | Age: 59
End: 2024-03-12
Payer: OTHER MISCELLANEOUS

## 2024-03-12 VITALS — WEIGHT: 190 LBS | HEIGHT: 73 IN | BODY MASS INDEX: 25.18 KG/M2

## 2024-03-12 DIAGNOSIS — G56.22 CUBITAL TUNNEL SYNDROME ON LEFT: Primary | ICD-10-CM

## 2024-03-12 PROCEDURE — 99024 POSTOP FOLLOW-UP VISIT: CPT | Performed by: STUDENT IN AN ORGANIZED HEALTH CARE EDUCATION/TRAINING PROGRAM

## 2024-03-12 NOTE — LETTER
March 12, 2024      Refugio Davies  [address]  [place] [address]        To Whom It May Concern:    Refugio Davies was seen in our clinic. He may return to work without restrictions. Patient can follow up as needed       Sincerely,        Driss Marshall MD

## 2024-03-12 NOTE — LETTER
3/12/2024         RE: Refugio Davies  [address] [address]        Dear Colleague,    Thank you for referring your patient, Refugio Davies, to the [facility] [place]. Please see a copy of my visit note below.    Orthopaedic Surgery Hand Clinic Progress Note    Patient Name: Refugio Davies  MRN: [id number]  : 1965  Date: Mar 12, 2024    Date of Surgery: 23    Surgery Performed: left In-situ Cubital tunnel release     Update 3/12/24: Patient is now almost 12 weeks status post above.  He is doing quite well.  He has completed work hardening and feels strong and ready to return to work full-time.  He reports no significant issues with the left upper extremity.  He very occasionally experiences some medial elbow soreness and radiating symptoms in the dorsal ulnar hand, however he is much improved compared to before the surgery.        Objective:  There were no vitals taken for this visit.    General: alert, appropriate, NAD  HEENT: NC/AT  CV: RRR by pulse  Pulm: Unlabored on RA  MSK:  Incision is clean and intact  No erythema, drainage, infection  Full elbow range of motion  No tenderness along the incision over the cubital tunnel, negative Tinel's  Sensation is intact to light touch in the median radial ulnar nerve distributions  5/5 EPL, FPL, hand intrinsics, 5/5 FDP/5  Warm well-perfused, capillary refill less than 2 seconds    Assessment/Plan:   58-year-old male status post left in situ cubital tunnel release 2023    Patient is cleared to return to full duty without restrictions.  Work note provided.    He asked if it is possible this condition could recur.  I advised that it is infrequent, but possible.  If he has any return of symptoms, he may return to see me. If needed, next step would be an ulnar nerve transposition.  Otherwise he may follow-up as needed.    All questions appear the patient voiced understanding and agreement.    Driss GALVEZ  MD [place]    Hand & Upper Extremity Surgery  Department of Orthopedic Surgery  [facility]      Again, thank you for allowing me to participate in the care of your patient.        Sincerely,        Driss Marshall MD

## 2024-03-14 ENCOUNTER — TELEPHONE (OUTPATIENT)
Dept: ORTHOPEDICS | Facility: CLINIC | Age: 59
End: 2024-03-14
Payer: COMMERCIAL

## 2024-03-14 NOTE — TELEPHONE ENCOUNTER
Please advise if patient has reached maximum medical improvement and if they've sustained any permanent partial disability (PPD) from the injury.     Dinora Rogel MSA, ATC  Certified Athletic Trainer

## 2024-03-14 NOTE — TELEPHONE ENCOUNTER
Reason for Call:  Form, our goal is to have forms completed with 7 days, however, some forms may require a visit or additional information.    Type of letter, form or note:  work comp     Who is the form from?:  work comp- [facility]    Where did the form come from: form was faxed in    Phone number of person requesting form: Hortensia Arreaga, Workers' Compensation  [employer], phone #: [phone]  Can we leave a detailed message on this number:  NO    Desired completion date of form: ASAP      How will form be returned?:  fax to Hortensia Arreaga at fax #: [phone]    Has the patient signed a consent form for release of information (may be included with form)? Not Applicable    Additional comments: patient is s/p left In-situ Cubital tunnel release, 12/22/23    Form was started and place in accordion folder labeled forms for provider review/ completion at [facility].

## 2024-03-18 NOTE — TELEPHONE ENCOUNTER
Please verify if both procedures performed were due to initial work comp injury on 1/6/23:   -9/14/23: Excisional debridement of left extensor carpi radialis brevis muscle at lateral epicondyle   -12/22/23: Left in-situ cubital tunnel release     Dinora Rogel MSA, ATC  Certified Athletic Trainer

## 2024-03-28 NOTE — TELEPHONE ENCOUNTER
Document faxed. Copy placed in completed forms folder for reference and original sent to scan.     Dinora Rogel MSA, ATC  Certified Athletic Trainer

## 2024-05-10 SDOH — HEALTH STABILITY: PHYSICAL HEALTH: ON AVERAGE, HOW MANY MINUTES DO YOU ENGAGE IN EXERCISE AT THIS LEVEL?: 30 MIN

## 2024-05-10 SDOH — HEALTH STABILITY: PHYSICAL HEALTH: ON AVERAGE, HOW MANY DAYS PER WEEK DO YOU ENGAGE IN MODERATE TO STRENUOUS EXERCISE (LIKE A BRISK WALK)?: 5 DAYS

## 2024-05-10 ASSESSMENT — SOCIAL DETERMINANTS OF HEALTH (SDOH): HOW OFTEN DO YOU GET TOGETHER WITH FRIENDS OR RELATIVES?: TWICE A WEEK

## 2024-05-13 ENCOUNTER — OFFICE VISIT (OUTPATIENT)
Dept: FAMILY MEDICINE | Facility: CLINIC | Age: 59
End: 2024-05-13
Payer: COMMERCIAL

## 2024-05-13 VITALS
DIASTOLIC BLOOD PRESSURE: 64 MMHG | TEMPERATURE: 98.1 F | RESPIRATION RATE: 20 BRPM | WEIGHT: 183 LBS | BODY MASS INDEX: 24.25 KG/M2 | SYSTOLIC BLOOD PRESSURE: 122 MMHG | OXYGEN SATURATION: 98 % | HEIGHT: 73 IN | HEART RATE: 97 BPM

## 2024-05-13 DIAGNOSIS — F17.200 TOBACCO USE DISORDER: ICD-10-CM

## 2024-05-13 DIAGNOSIS — Z13.220 SCREENING FOR LIPID DISORDERS: ICD-10-CM

## 2024-05-13 DIAGNOSIS — F17.200 NICOTINE DEPENDENCE, UNCOMPLICATED, UNSPECIFIED NICOTINE PRODUCT TYPE: ICD-10-CM

## 2024-05-13 DIAGNOSIS — Z28.21 VACCINATION NOT CARRIED OUT BECAUSE OF PATIENT REFUSAL: ICD-10-CM

## 2024-05-13 DIAGNOSIS — Z00.00 ROUTINE GENERAL MEDICAL EXAMINATION AT A HEALTH CARE FACILITY: Primary | ICD-10-CM

## 2024-05-13 DIAGNOSIS — Z12.5 PROSTATE CANCER SCREENING: ICD-10-CM

## 2024-05-13 DIAGNOSIS — Z12.11 COLON CANCER SCREENING: ICD-10-CM

## 2024-05-13 LAB
CHOLEST SERPL-MCNC: 214 MG/DL
FASTING STATUS PATIENT QL REPORTED: NO
HDLC SERPL-MCNC: 42 MG/DL
LDLC SERPL CALC-MCNC: 137 MG/DL
NONHDLC SERPL-MCNC: 172 MG/DL
PSA SERPL DL<=0.01 NG/ML-MCNC: 0.76 NG/ML (ref 0–3.5)
TRIGL SERPL-MCNC: 174 MG/DL

## 2024-05-13 PROCEDURE — 99406 BEHAV CHNG SMOKING 3-10 MIN: CPT | Performed by: FAMILY MEDICINE

## 2024-05-13 PROCEDURE — 99396 PREV VISIT EST AGE 40-64: CPT | Performed by: FAMILY MEDICINE

## 2024-05-13 PROCEDURE — 99213 OFFICE O/P EST LOW 20 MIN: CPT | Performed by: FAMILY MEDICINE

## 2024-05-13 PROCEDURE — 36415 COLL VENOUS BLD VENIPUNCTURE: CPT | Performed by: FAMILY MEDICINE

## 2024-05-13 PROCEDURE — G0103 PSA SCREENING: HCPCS | Performed by: FAMILY MEDICINE

## 2024-05-13 PROCEDURE — 80061 LIPID PANEL: CPT | Performed by: FAMILY MEDICINE

## 2024-05-13 RX ORDER — NICOTINE 21 MG/24HR
1 PATCH, TRANSDERMAL 24 HOURS TRANSDERMAL EVERY 24 HOURS
Qty: 28 PATCH | Refills: 5 | Status: SHIPPED | OUTPATIENT
Start: 2024-05-13

## 2024-05-13 ASSESSMENT — PAIN SCALES - GENERAL: PAINLEVEL: MILD PAIN (2)

## 2024-05-13 NOTE — PROGRESS NOTES
Preventive Care Visit  [facility]  Vito Maharaj MD, Family Medicine  May 13, 2024      Assessment & Plan     (Z00.00) Routine general medical examination at a health care facility  (primary encounter diagnosis)  Comment: Negative screening exam; up-to-date on preventive services except immunizations.   Plan: Lipid panel reflex to direct LDL Non-fasting,         Prostate Specific Antigen Screen, Fecal         colorectal cancer screen FIT        Return in about 1 year (around 5/13/2025) for full physical.      (F17.200) Tobacco use disorder  Comment: I offered bupropion, Chantix, and nicotine replacement products (which appear to be completely covered by his insurance).   Plan: nicotine (NICODERM CQ) 14 MG/24HR 24 hr patch        Lung cancer screening CT ordered last fall, but he has not scheduled this yet. Contact info provided.     (Z13.220) Screening for lipid disorders  Comment: Non-fasting.   Plan: Lipid panel reflex to direct LDL Non-fasting            (Z12.5) Prostate cancer screening  Comment:   Plan: Prostate Specific Antigen Screen            (Z12.11) Colon cancer screening  Comment:   Plan: Fecal colorectal cancer screen FIT            (Z28.21) Vaccination not carried out because of patient refusal  Comment: COVID-19 and Hep-B  Plan:           Nicotine/Tobacco Cessation  He reports that he has been smoking cigarettes. He has a 15 pack-year smoking history. He has never used smokeless tobacco.  Nicotine/Tobacco Cessation Plan  Pharmacotherapies : Nicotine patch       Counseling  Appropriate preventive services were discussed with this patient, including applicable screening as appropriate for fall prevention, nutrition, physical activity, Tobacco-use cessation, weight loss and cognition.  Checklist reviewing preventive services available has been given to the patient.  Reviewed patient's diet, addressing concerns and/or questions.           Gregg Huff is a 58 year  old, presenting for the following:  Physical        5/13/2024     8:45 AM   Additional Questions   Roomed by shree   Accompanied by self         5/13/2024     8:45 AM   Patient Reported Additional Medications   Patient reports taking the following new medications none        Health Care Directive  Patient does not have a Health Care Directive or Living Will: Discussed advance care planning with patient; information given to patient to review.    HPI        5/10/2024   General Health   How would you rate your overall physical health? Good   Feel stress (tense, anxious, or unable to sleep) Not at all         5/10/2024   Nutrition   Three or more servings of calcium each day? Yes   Diet: Regular (no restrictions)   How many servings of fruit and vegetables per day? (!) 0-1   How many sweetened beverages each day? 0-1         5/10/2024   Exercise   Days per week of moderate/strenous exercise 5 days   Average minutes spent exercising at this level 30 min         5/10/2024   Social Factors   Frequency of gathering with friends or relatives Twice a week   Worry food won't last until get money to buy more No   Food not last or not have enough money for food? No   Do you have housing?  Yes   Are you worried about losing your housing? No   Lack of transportation? No   Unable to get utilities (heat,electricity)? No         5/10/2024   Fall Risk   Fallen 2 or more times in the past year? No   Trouble with walking or balance? No          5/10/2024   Dental   Dentist two times every year? Yes         5/10/2024   TB Screening   Were you born outside of the [place]? No           Today's PHQ-2 Score:       1/5/2024     3:26 PM   PHQ-2 ( 1999 [employer])   Q1: Little interest or pleasure in doing things 0   Q2: Feeling down, depressed or hopeless 0   PHQ-2 Score 0         5/10/2024   Substance Use   If I could quit smoking, I would Neutral   I want to quit somking, worry about health affects Neutral   Willing to make a plan to quit smoking  "Neutral   Willing to cut down before quitting Neutral   Alcohol more than 3/day or more than 7/wk No   Do you use any other substances recreationally? No     Social History     Tobacco Use    Smoking status: Every Day     Current packs/day: 0.50     Average packs/day: 0.5 packs/day for 30.0 years (15.0 ttl pk-yrs)     Types: Cigarettes    Smokeless tobacco: Never    Tobacco comments:     started age 17, average 1-2 ppd since then     10 cigarettes daily 12/12/23   Vaping Use    Vaping status: Never Used   Substance Use Topics    Alcohol use: Not Currently    Drug use: No           5/10/2024   STI Screening   New sexual partner(s) since last STI/HIV test? No   Last PSA:   PSA   Date Value Ref Range Status   08/19/2020 0.66 0 - 4 ug/L Final     Comment:     Assay Method:  Chemiluminescence using Siemens Vista analyzer     Prostate Specific Antigen Screen   Date Value Ref Range Status   09/11/2023 0.62 0.00 - 3.50 ng/mL Final   08/18/2022 0.84 0.00 - 4.00 ug/L Final     ASCVD Risk   The 10-year ASCVD risk score (Jona LEES, et al., 2019) is: 13.5%    Values used to calculate the score:      Age: 58 years      Sex: Male      Is Non- : No      Diabetic: No      Tobacco smoker: Yes      Systolic Blood Pressure: 122 mmHg      Is BP treated: No      HDL Cholesterol: 41 mg/dL      Total Cholesterol: 215 mg/dL    Past medical, family, and social histories, medications, and allergies are reviewed and updated in [facility].            Review of Systems      Objective    Exam  /64 (BP Location: Right arm, Patient Position: Chair, Cuff Size: Adult Large)   Pulse 97   Temp 98.1  F (36.7  C) (Tympanic)   Resp 20   Ht 1.854 m (6' 1\")   Wt 83 kg (183 lb)   SpO2 98%   BMI 24.14 kg/m     Estimated body mass index is 24.14 kg/m  as calculated from the following:    Height as of this encounter: 1.854 m (6' 1\").    Weight as of this encounter: 83 kg (183 lb).    Physical Exam  GENERAL: alert and no " distress  EYES: Eyes grossly normal to inspection, PERRL and conjunctivae and sclerae normal  HENT: ear canals and TM's normal, nose and mouth without ulcers or lesions  NECK: no adenopathy, no asymmetry, masses, or scars  RESP: lungs clear to auscultation - no rales, rhonchi or wheezes  CV: regular rate and rhythm, normal S1 S2, no S3 or S4, no murmur, click or rub, no peripheral edema  ABDOMEN: soft, nontender, no hepatosplenomegaly, no masses and bowel sounds normal   (male): normal male genitalia without lesions or urethral discharge, no hernia  MS: no gross musculoskeletal defects noted, no edema  SKIN: no suspicious lesions or rashes  NEURO: Normal strength and tone, mentation intact and speech normal  PSYCH: mentation appears normal, affect normal/bright        Signed Electronically by: Vito Maharaj MD      The information in this document, created by the medical scribe for me, accurately reflects the services I personally performed and the decisions made by me. I have reviewed and approved this document for accuracy prior to signature.  Shahzad Davila

## 2024-05-13 NOTE — PATIENT INSTRUCTIONS
"Please call [facility]: [phone] ([place] or [place]) or [facility]: [phone] ([facility]) to schedule your chest CT scan.       Preventive Care Advice   This is general advice we often give to help people stay healthy. Your care team may have specific advice just for you. Please talk to your care team about your own preventive care needs.  Lifestyle  Exercise at least 150 minutes each week (30 minutes a day, 5 days a week).  Do muscle strengthening activities 2 days a week. These help control your weight and prevent disease.  No smoking.  Wear sunscreen to prevent skin cancer.  Have your home tested for radon every 2 to 5 years. Radon is a colorless, odorless gas that can harm your lungs. To learn more, go to www.health.state.[place] and search for \"Radon in Homes.\"  Keep guns unloaded and locked up in a safe place like a safe or gun vault, or, use a gun lock and hide the keys. Always lock away bullets separately. To learn more, visit [place] and search for \"safe gun storage.\"  Nutrition  Eat 5 or more servings of fruits and vegetables each day.  Try wheat bread, brown rice and whole grain pasta (instead of white bread, rice, and pasta).  Get enough calcium and vitamin D. Check the label on foods and aim for 100% of the RDA (recommended daily allowance).  Regular exams  Have a dental exam and cleaning every 6 months.  See your health care team every year to talk about:  Any changes in your health.  Any medicines your care team has prescribed.  Preventive care, family planning, and ways to prevent chronic diseases.  Shots (vaccines)   HPV shots (up to age 26), if you've never had them before.  Hepatitis B shots (up to age 59), if you've never had them before.  COVID-19 shot: Get this shot when it's due.  Flu shot: Get a flu shot every year.  Tetanus shot: Get a tetanus shot every 10 years.  Pneumococcal, hepatitis A, and RSV shots: Ask your care team " if you need these based on your risk.  Shingles shot (for age 50 and up).  General health tests  Diabetes screening:  Starting at age 35, Get screened for diabetes at least every 3 years.  If you are younger than age 35, ask your care team if you should be screened for diabetes.  Cholesterol test: At age 39, start having a cholesterol test every 5 years, or more often if advised.  Bone density scan (DEXA): At age 50, ask your care team if you should have this scan for osteoporosis (brittle bones).  Hepatitis C: Get tested at least once in your life.  Abdominal aortic aneurysm screening: Talk to your doctor about having this screening if you:  Have ever smoked; and  Are biologically male; and  Are between the ages of 65 and 75.  STIs (sexually transmitted infections)  Before age 24: Ask your care team if you should be screened for STIs.  After age 24: Get screened for STIs if you're at risk. You are at risk for STIs (including HIV) if:  You are sexually active with more than one person.  You don't use condoms every time.  You or a partner was diagnosed with a sexually transmitted infection.  If you are at risk for HIV, ask about PrEP medicine to prevent HIV.  Get tested for HIV at least once in your life, whether you are at risk for HIV or not.  Cancer screening tests  Cervical cancer screening: If you have a cervix, begin getting regular cervical cancer screening tests at age 21. Most people who have regular screenings with normal results can stop after age 65. Talk about this with your provider.  Breast cancer scan (mammogram): If you've ever had breasts, begin having regular mammograms starting at age 40. This is a scan to check for breast cancer.  Colon cancer screening: It is important to start screening for colon cancer at age 45.  Have a colonoscopy test every 10 years (or more often if you're at risk) Or, ask your provider about stool tests like a FIT test every year or Cologuard test every 3 years.  To learn  "more about your testing options, visit: www.[employer]/394285.pdf.  For help making a decision, visit: david/po97837.  Prostate cancer screening test: If you have a prostate and are age 55 to 69, ask your provider if you would benefit from a yearly prostate cancer screening test.  Lung cancer screening: If you are a current or former smoker age 50 to 80, ask your care team if ongoing lung cancer screenings are right for you.  For informational purposes only. Not to replace the advice of your health care provider. Copyright    [employer]. All rights reserved. Clinically reviewed by the [facility]. [employer] 199661 - REV .    Learning About Benefits of Quitting Smoking  Quitting smoking helps your body in many ways. Quitting lowers your risk for cancer, lung disease, heart attack, stroke, blood vessel disease, and blindness. You'll also get sick less often and heal faster. And after you quit, you may find that your mood is better and you are less stressed.  When and how will you feel healthier after quitting smoking?    In the first hours or days:    Your blood pressure and heart rate go down.  Your oxygen levels increase.    Within weeks or months:    You will cough less and breathe deeper. It may be easier to be active.  Your sense of taste and smell should return.    Over the years:    Your risks of heart disease, heart attack, and stroke are lower.  Your risk of lung cancer is cut by about half after about 10 years. And your risk for other cancers is lower too.  How would quitting help others in your life?    Their heart, lung, and cancer risks may drop, much like yours. They will also be sick less.   If you are or will be pregnant someday, quitting smoking means a healthier .   Where can you learn more?  Go to https://www.healthwise.net/patiented  Enter O319 in the search box to learn more about \"Learning About Benefits of Quitting Smoking.\"  Current as " of: November 15, 2023               Content Version: 14.0    6922-6067 [employer].   Care instructions adapted under license by your healthcare professional. If you have questions about a medical condition or this instruction, always ask your healthcare professional. [employer] disclaims any warranty or liability for your use of this information.

## 2024-05-16 PROCEDURE — 82274 ASSAY TEST FOR BLOOD FECAL: CPT | Performed by: FAMILY MEDICINE

## 2024-05-20 LAB — HEMOCCULT STL QL IA: NEGATIVE

## 2025-04-14 ENCOUNTER — PATIENT OUTREACH (OUTPATIENT)
Dept: CARE COORDINATION | Facility: CLINIC | Age: 60
End: 2025-04-14
Payer: COMMERCIAL

## 2025-04-28 ENCOUNTER — PATIENT OUTREACH (OUTPATIENT)
Dept: CARE COORDINATION | Facility: CLINIC | Age: 60
End: 2025-04-28
Payer: COMMERCIAL

## 2025-06-15 ENCOUNTER — HEALTH MAINTENANCE LETTER (OUTPATIENT)
Age: 60
End: 2025-06-15

## 2025-07-23 ENCOUNTER — TRANSFERRED RECORDS (OUTPATIENT)
Dept: ADMINISTRATIVE | Facility: CLINIC | Age: 60
End: 2025-07-23
Payer: COMMERCIAL

## 2025-07-24 NOTE — PROGRESS NOTES
_________________________________________________________________________________________________    [address] [address]  [place] [address]  [phone]      Patient:            Refugio Davies   YOB: 1965  Date:                    7/23/2025  Historian:    self  Visit Type:              Office Visit   Rendering Provider:  Yunier Celis MD    History of Present Illness:    Patient is a 60-year-old man who was seen in GI clinic today at the request of Dr. Elisabeth Hills MD due to concerns about nausea and change in bowel habits.    Patient reports that he has had 4 episodes in the last 1.5 months with acute onset nausea followed by watery diarrhea.  Symptoms would usually last a day or two.  In between episodes, he feels well.  Reported nonbloody, watery bowel movements when symptomatic.  Denies any fever, chills, vomiting, abdominal pain.  No nocturnal symptoms.  He might have lost few pounds but no major significant unintentional weight loss per patient.  Symptoms are not related to any specific food.  He denies any recent travel, sick contacts, antibiotic use, new medications or unusual food consumption.  Denies regular use of NSAIDs.  Patient reports that he discussed with his sister who is a nutritionist and she suggested possibility of 'mold in his coffee'.  Since then he has changed his coffee pot and coffee supplies and has also started using probiotics.  He has not had recurrent symptoms in the last 3 weeks.    Reported smoking half pack daily for last 30 years.  Denies alcohol intake.  No family history of IBD or GI malignancy.  Patient reports that he had a colonoscopy at the age of 31 due to rectal bleeding and a benign polyp was removed.  I do not have those records for my review.    Recent workup by outside provider on 6/25/2025 was reviewed.  It showed mild leukocytosis with WBC count of 13.4, normal hepatic function panel, negative stool enteric pathogen panel, negative TTG IgA with  normal total IgA.  Other stool studies were also obtained including stool ova and parasite, H. pylori, stool WBC, Giardia and Cryptosporidium however I do not have those results for my review.  Patient reports that all the studies were negative.    Assessment/Plan  # Detail Type Description   1. Assessment Nausea (R11.0).   2. Assessment Change in bowel habits (R19.4).     Detail Type Description   Impression Patient likely had infectious diarrhea as suggested by acute onset symptoms with quick resolution.  His outside workup showed leukocytosis suggesting possibility of infectious etiology.  Inflammatory bowel disease seems less likely however it seems reasonable to rule out this possibility given recurrent episodes in the last couple months.  Patient has upcoming colonoscopy appointment on 7/28/2025.  C. difficile was not tested and I would like to rule it out  but his diarrhea has resolved at this time.  I do not think any additional workup is required at this time but can certainly be considered if he continues to have recurrent symptoms and colonoscopy remains unremarkable.     Detail Type Description   Patient Plan Stool C. difficile testing.  Complete this if you experience recurrent diarrhea    I will try to obtain records of your stool studies from referring provider    Colonoscopy as scheduled on 7/28/2025 with TI intubation and random colon biopsies    Follow-up with GI clinic if recurrent symptoms     Orders  Labs:  Test Comments Timeframe Assessment   C Difficile Toxin Gene LOUISA, Rfx To Cdiff Toxins A+B,EIA, Fecal  First Available R19.4       Follow-up visit/Referral:  Order Comments   follow-up visit with Yunier Celis MD if symptoms worsen      If your health care provider has asked for a follow-up visit, your appointment will be scheduled with a nurse practitioner or physician assistant on your provider's care team, unless noted above.      cc:  Vito Maharaj MD  cc:  Elisabeth Hills  MD    Electronically Signed by:  Yunier Celis MD  07/23/2025 08:04 AM      Allergies:  Medication Name Ingredient Reaction Comment    NO KNOWN ALLERGIES       Vitals:  BP mm/Hg Pulse/min Resp/min Temp F Height (Total in.) Weight (lbs.) Weight (oz.) BMI   138/64 86   74.00 179.00  22.98     Smoking Status:    Use Status Type Smoking Status Usage Per Day Years Used Total Pack Years   yes  Current every day smoker        Race:  White  Ethnicity:  Not  or   Preferred Language:  English

## 2025-07-28 ENCOUNTER — TRANSFERRED RECORDS (OUTPATIENT)
Dept: ADMINISTRATIVE | Facility: CLINIC | Age: 60
End: 2025-07-28
Payer: COMMERCIAL

## (undated) DEVICE — COVER CAMERA IN-LIGHT DISP LT-C02

## (undated) DEVICE — PACK HAND SOP32HARMO

## (undated) DEVICE — CAST PADDING 4" STERILE 9044S

## (undated) DEVICE — ESU GROUND PAD ADULT W/CORD E7507

## (undated) DEVICE — GLOVE BIOGEL PI MICRO SZ 7.0 48570

## (undated) DEVICE — DRSG ABDOMINAL 07 1/2X8" 7197D

## (undated) DEVICE — LINEN ORTHO PACK 5446

## (undated) DEVICE — DRSG KERLIX FLUFFS X5

## (undated) DEVICE — BNDG ELASTIC 4"X5YDS UNSTERILE 6611-40

## (undated) DEVICE — DRSG STERI STRIP 1X5" R1548

## (undated) DEVICE — DRSG GAUZE 2X2" 8042

## (undated) DEVICE — PACK HAND CUSTOM ASC

## (undated) DEVICE — TOURNIQUET SGL BLADDER 18"X4" RED 5921-218-135

## (undated) DEVICE — LINEN GOWN XLG 5407

## (undated) DEVICE — SU VICRYL 3-0 SH 27" UND J416H

## (undated) DEVICE — SUCTION MANIFOLD NEPTUNE 2 SYS 4 PORT 0702-020-000

## (undated) DEVICE — SOL NACL 0.9% IRRIG 1000ML BOTTLE 2F7124

## (undated) DEVICE — SOL NACL 0.9% IRRIG 500ML BOTTLE 2F7123

## (undated) DEVICE — SU MONOCRYL 4-0 PS-2 18" UND Y496G

## (undated) DEVICE — SU MONOCRYL 4-0 PS-2 27" UND Y426H

## (undated) DEVICE — DRAPE CONVERTORS U-DRAPE 60X72" 8476

## (undated) DEVICE — SOL WATER IRRIG 500ML BOTTLE 2F7113

## (undated) DEVICE — GLOVE PROTEXIS W/NEU-THERA 7.0  2D73TE70

## (undated) RX ORDER — PROPOFOL 10 MG/ML
INJECTION, EMULSION INTRAVENOUS
Status: DISPENSED
Start: 2023-12-22

## (undated) RX ORDER — TRIAMCINOLONE ACETONIDE 40 MG/ML
INJECTION, SUSPENSION INTRA-ARTICULAR; INTRAMUSCULAR
Status: DISPENSED
Start: 2023-11-10

## (undated) RX ORDER — PROPOFOL 10 MG/ML
INJECTION, EMULSION INTRAVENOUS
Status: DISPENSED
Start: 2023-09-14

## (undated) RX ORDER — CEFAZOLIN SODIUM 2 G/50ML
SOLUTION INTRAVENOUS
Status: DISPENSED
Start: 2023-09-14

## (undated) RX ORDER — LIDOCAINE HYDROCHLORIDE 10 MG/ML
INJECTION, SOLUTION EPIDURAL; INFILTRATION; INTRACAUDAL; PERINEURAL
Status: DISPENSED
Start: 2023-11-10

## (undated) RX ORDER — FENTANYL CITRATE 50 UG/ML
INJECTION, SOLUTION INTRAMUSCULAR; INTRAVENOUS
Status: DISPENSED
Start: 2023-12-22

## (undated) RX ORDER — OXYCODONE HYDROCHLORIDE 5 MG/1
TABLET ORAL
Status: DISPENSED
Start: 2023-12-22

## (undated) RX ORDER — FENTANYL CITRATE 50 UG/ML
INJECTION, SOLUTION INTRAMUSCULAR; INTRAVENOUS
Status: DISPENSED
Start: 2023-09-14

## (undated) RX ORDER — ACETAMINOPHEN 325 MG/1
TABLET ORAL
Status: DISPENSED
Start: 2023-09-14

## (undated) RX ORDER — LIDOCAINE HYDROCHLORIDE AND EPINEPHRINE 10; 10 MG/ML; UG/ML
INJECTION, SOLUTION INFILTRATION; PERINEURAL
Status: DISPENSED
Start: 2023-12-22

## (undated) RX ORDER — BUPIVACAINE HYDROCHLORIDE 5 MG/ML
INJECTION, SOLUTION EPIDURAL; INTRACAUDAL
Status: DISPENSED
Start: 2023-12-22